# Patient Record
Sex: MALE | Race: WHITE | NOT HISPANIC OR LATINO | Employment: OTHER | URBAN - METROPOLITAN AREA
[De-identification: names, ages, dates, MRNs, and addresses within clinical notes are randomized per-mention and may not be internally consistent; named-entity substitution may affect disease eponyms.]

---

## 2017-01-03 RX ORDER — ALPRAZOLAM 2 MG/1
TABLET ORAL
Qty: 30 TABLET | Refills: 0 | Status: CANCELLED | OUTPATIENT
Start: 2017-01-03

## 2017-01-03 RX ORDER — ZOLPIDEM TARTRATE 10 MG/1
TABLET ORAL
Qty: 30 TABLET | Refills: 0 | Status: CANCELLED | OUTPATIENT
Start: 2017-01-03

## 2017-01-04 RX ORDER — ALPRAZOLAM 2 MG/1
2 TABLET ORAL 3 TIMES DAILY PRN
Qty: 30 TABLET | Refills: 0 | OUTPATIENT
Start: 2017-01-04 | End: 2017-02-01 | Stop reason: SDUPTHER

## 2017-01-04 RX ORDER — ZOLPIDEM TARTRATE 10 MG/1
10 TABLET ORAL NIGHTLY PRN
Qty: 30 TABLET | Refills: 0 | OUTPATIENT
Start: 2017-01-04 | End: 2017-01-09 | Stop reason: SDUPTHER

## 2017-01-10 RX ORDER — ZOLPIDEM TARTRATE 10 MG/1
TABLET ORAL
Qty: 30 TABLET | Refills: 0 | OUTPATIENT
Start: 2017-01-10 | End: 2017-02-01 | Stop reason: SDUPTHER

## 2017-01-25 RX ORDER — VALSARTAN 320 MG/1
TABLET ORAL
Qty: 90 TABLET | Refills: 1 | Status: SHIPPED | OUTPATIENT
Start: 2017-01-25 | End: 2017-04-18 | Stop reason: SDUPTHER

## 2017-02-01 RX ORDER — ALPRAZOLAM 2 MG/1
TABLET ORAL
Qty: 30 TABLET | Refills: 0 | OUTPATIENT
Start: 2017-02-01 | End: 2017-03-02 | Stop reason: SDUPTHER

## 2017-02-01 RX ORDER — ZOLPIDEM TARTRATE 10 MG/1
TABLET ORAL
Qty: 30 TABLET | Refills: 0 | OUTPATIENT
Start: 2017-02-01 | End: 2017-03-02 | Stop reason: SDUPTHER

## 2017-03-03 RX ORDER — ALPRAZOLAM 2 MG/1
TABLET ORAL
Qty: 30 TABLET | Refills: 0 | OUTPATIENT
Start: 2017-03-03 | End: 2017-04-03 | Stop reason: SDUPTHER

## 2017-03-03 RX ORDER — ZOLPIDEM TARTRATE 10 MG/1
TABLET ORAL
Qty: 30 TABLET | Refills: 0 | OUTPATIENT
Start: 2017-03-03 | End: 2017-04-03 | Stop reason: SDUPTHER

## 2017-04-03 RX ORDER — ALPRAZOLAM 2 MG/1
TABLET ORAL
Qty: 30 TABLET | Refills: 0 | OUTPATIENT
Start: 2017-04-03 | End: 2017-04-18 | Stop reason: SDUPTHER

## 2017-04-03 RX ORDER — ZOLPIDEM TARTRATE 10 MG/1
TABLET ORAL
Qty: 30 TABLET | Refills: 0 | OUTPATIENT
Start: 2017-04-03 | End: 2017-04-18 | Stop reason: SDUPTHER

## 2017-04-18 ENCOUNTER — OFFICE VISIT (OUTPATIENT)
Dept: FAMILY MEDICINE CLINIC | Facility: CLINIC | Age: 77
End: 2017-04-18

## 2017-04-18 VITALS
HEART RATE: 80 BPM | HEIGHT: 73 IN | DIASTOLIC BLOOD PRESSURE: 70 MMHG | SYSTOLIC BLOOD PRESSURE: 106 MMHG | BODY MASS INDEX: 29.29 KG/M2 | OXYGEN SATURATION: 93 % | TEMPERATURE: 97.9 F | WEIGHT: 221 LBS

## 2017-04-18 DIAGNOSIS — I10 HYPERTENSION, ESSENTIAL: Primary | ICD-10-CM

## 2017-04-18 DIAGNOSIS — G47.09 OTHER INSOMNIA: ICD-10-CM

## 2017-04-18 DIAGNOSIS — Z12.11 SCREEN FOR COLON CANCER: ICD-10-CM

## 2017-04-18 DIAGNOSIS — E78.5 HYPERLIPIDEMIA, UNSPECIFIED HYPERLIPIDEMIA TYPE: ICD-10-CM

## 2017-04-18 LAB
ALBUMIN SERPL-MCNC: 4.2 G/DL (ref 3.5–5.2)
ALBUMIN/GLOB SERPL: 1.6 G/DL
ALP SERPL-CCNC: 82 U/L (ref 39–117)
ALT SERPL W P-5'-P-CCNC: 42 U/L (ref 1–41)
ANION GAP SERPL CALCULATED.3IONS-SCNC: 11.7 MMOL/L
AST SERPL-CCNC: 27 U/L (ref 1–40)
BILIRUB SERPL-MCNC: 0.8 MG/DL (ref 0.1–1.2)
BUN BLD-MCNC: 9 MG/DL (ref 8–23)
BUN/CREAT SERPL: 9.2 (ref 7–25)
CALCIUM SPEC-SCNC: 9.8 MG/DL (ref 8.6–10.5)
CHLORIDE SERPL-SCNC: 106 MMOL/L (ref 98–107)
CHOLEST SERPL-MCNC: 128 MG/DL (ref 0–200)
CO2 SERPL-SCNC: 25.3 MMOL/L (ref 22–29)
CREAT BLD-MCNC: 0.98 MG/DL (ref 0.76–1.27)
GFR SERPL CREATININE-BSD FRML MDRD: 75 ML/MIN/1.73
GLOBULIN UR ELPH-MCNC: 2.7 GM/DL
GLUCOSE BLD-MCNC: 96 MG/DL (ref 65–99)
HDLC SERPL-MCNC: 30 MG/DL (ref 40–60)
LDLC SERPL CALC-MCNC: 58 MG/DL (ref 0–100)
LDLC/HDLC SERPL: 1.93 {RATIO}
POTASSIUM BLD-SCNC: 5.1 MMOL/L (ref 3.5–5.2)
PROT SERPL-MCNC: 6.9 G/DL (ref 6–8.5)
SODIUM BLD-SCNC: 143 MMOL/L (ref 136–145)
TRIGL SERPL-MCNC: 201 MG/DL (ref 0–150)
VLDLC SERPL-MCNC: 40.2 MG/DL (ref 5–40)

## 2017-04-18 PROCEDURE — 80061 LIPID PANEL: CPT | Performed by: INTERNAL MEDICINE

## 2017-04-18 PROCEDURE — 99214 OFFICE O/P EST MOD 30 MIN: CPT | Performed by: INTERNAL MEDICINE

## 2017-04-18 PROCEDURE — 80053 COMPREHEN METABOLIC PANEL: CPT | Performed by: INTERNAL MEDICINE

## 2017-04-18 RX ORDER — ATORVASTATIN CALCIUM 20 MG/1
20 TABLET, FILM COATED ORAL DAILY
Qty: 90 TABLET | Refills: 3 | Status: SHIPPED | OUTPATIENT
Start: 2017-04-18 | End: 2018-04-14 | Stop reason: SDUPTHER

## 2017-04-18 RX ORDER — ALPRAZOLAM 2 MG/1
2 TABLET ORAL NIGHTLY PRN
Qty: 30 TABLET | Refills: 0 | Status: SHIPPED | OUTPATIENT
Start: 2017-04-18 | End: 2017-06-02 | Stop reason: SDUPTHER

## 2017-04-18 RX ORDER — ZOLPIDEM TARTRATE 10 MG/1
10 TABLET ORAL NIGHTLY PRN
Qty: 30 TABLET | Refills: 0 | Status: SHIPPED | OUTPATIENT
Start: 2017-04-18 | End: 2017-06-02 | Stop reason: SDUPTHER

## 2017-04-18 RX ORDER — VALSARTAN 320 MG/1
320 TABLET ORAL DAILY
Qty: 90 TABLET | Refills: 3 | Status: SHIPPED | OUTPATIENT
Start: 2017-04-18 | End: 2018-04-14 | Stop reason: SDUPTHER

## 2017-04-18 RX ORDER — NIACIN 750 MG/1
750 TABLET, EXTENDED RELEASE ORAL 2 TIMES DAILY
Qty: 180 TABLET | Refills: 3 | Status: SHIPPED | OUTPATIENT
Start: 2017-04-18 | End: 2018-05-06 | Stop reason: SDUPTHER

## 2017-04-18 RX ORDER — OMEPRAZOLE 40 MG/1
40 CAPSULE, DELAYED RELEASE ORAL DAILY
Qty: 90 CAPSULE | Refills: 3 | Status: SHIPPED | OUTPATIENT
Start: 2017-04-18 | End: 2018-04-27 | Stop reason: SDUPTHER

## 2017-04-18 NOTE — PROGRESS NOTES
Subjective   Lucho Blank is a 76 y.o. male.   High blood pressure lipids follow-up lab  History of Present Illness   Doing fair well no complaints did discuss colonoscopy is not had one for a while and he is agreeable do so so we'll set him up for that.  No blood in stool so change in bowel habits.  Does have ongoing insomnia refill on that medication is compliant with his meds.  Tolerating statin without complaints.  Blood pressures satisfactory when he checks it  prostate is followed by urology    Review of Systems   All other systems reviewed and are negative.      Objective   Vitals:    04/18/17 0804   BP: 106/70   Pulse: 80   Temp: 97.9 °F (36.6 °C)   SpO2: 93%   Weight: 221 lb (100 kg)     Physical Exam   Constitutional: He appears well-developed and well-nourished.   Cardiovascular: Normal rate, regular rhythm and normal heart sounds.    Pulmonary/Chest: Effort normal and breath sounds normal.   Abdominal: Soft. Bowel sounds are normal.   Neurological: He is alert.   Nursing note and vitals reviewed.      No results found for: INR    Procedures    Assessment/Plan   Hypertension controlled plan continue present medicine recheck in 6 months     2.  Hyperlipidemia plan await lab if satisfactory follow-up in 6 months continue present medicine    3.  Insomnia refill Ambien    4.  Encounter for screening colonoscopy patient is agreeable to  [facility] we will refer to Dr. Victoria    Much of this encounter note is an electronic transcription/translation of spoken language to printed text.  The electronic translation of spoken language may permit erroneous, or at times, nonsensical words or phrases to be inadvertently transcribed.  Although I have reviewed the note for such errors, some may still exist.

## 2017-06-02 RX ORDER — ZOLPIDEM TARTRATE 10 MG/1
TABLET ORAL
Qty: 30 TABLET | Refills: 0 | Status: SHIPPED | OUTPATIENT
Start: 2017-06-02 | End: 2017-06-30 | Stop reason: SDUPTHER

## 2017-06-02 RX ORDER — ALPRAZOLAM 2 MG/1
TABLET ORAL
Qty: 30 TABLET | Refills: 0 | Status: SHIPPED | OUTPATIENT
Start: 2017-06-02 | End: 2017-06-30 | Stop reason: SDUPTHER

## 2017-06-17 RX ORDER — LORATADINE 10 MG/1
TABLET ORAL
Qty: 90 TABLET | Refills: 2 | Status: SHIPPED | OUTPATIENT
Start: 2017-06-17 | End: 2018-03-14 | Stop reason: SDUPTHER

## 2017-06-30 RX ORDER — ZOLPIDEM TARTRATE 10 MG/1
TABLET ORAL
Qty: 30 TABLET | Refills: 0 | OUTPATIENT
Start: 2017-06-30 | End: 2017-09-01 | Stop reason: SDUPTHER

## 2017-06-30 RX ORDER — ALPRAZOLAM 2 MG/1
TABLET ORAL
Qty: 30 TABLET | Refills: 0 | OUTPATIENT
Start: 2017-06-30 | End: 2017-09-01 | Stop reason: SDUPTHER

## 2017-08-02 ENCOUNTER — TELEPHONE (OUTPATIENT)
Dept: FAMILY MEDICINE CLINIC | Facility: CLINIC | Age: 77
End: 2017-08-02

## 2017-09-01 RX ORDER — ZOLPIDEM TARTRATE 10 MG/1
10 TABLET ORAL NIGHTLY PRN
Qty: 30 TABLET | Refills: 0 | OUTPATIENT
Start: 2017-09-01 | End: 2017-10-06 | Stop reason: SDUPTHER

## 2017-09-01 RX ORDER — ALPRAZOLAM 2 MG/1
2 TABLET ORAL EVERY 8 HOURS PRN
Qty: 30 TABLET | Refills: 0 | OUTPATIENT
Start: 2017-09-01 | End: 2017-10-03 | Stop reason: SDUPTHER

## 2017-10-03 RX ORDER — ALPRAZOLAM 2 MG/1
2 TABLET ORAL EVERY 8 HOURS PRN
Qty: 30 TABLET | Refills: 0 | OUTPATIENT
Start: 2017-10-03 | End: 2017-11-03 | Stop reason: SDUPTHER

## 2017-10-06 RX ORDER — ZOLPIDEM TARTRATE 10 MG/1
10 TABLET ORAL NIGHTLY PRN
Qty: 30 TABLET | Refills: 0 | OUTPATIENT
Start: 2017-10-06 | End: 2017-11-03 | Stop reason: SDUPTHER

## 2017-10-17 ENCOUNTER — OFFICE VISIT (OUTPATIENT)
Dept: FAMILY MEDICINE CLINIC | Facility: CLINIC | Age: 77
End: 2017-10-17

## 2017-10-17 VITALS
BODY MASS INDEX: 28.1 KG/M2 | SYSTOLIC BLOOD PRESSURE: 98 MMHG | DIASTOLIC BLOOD PRESSURE: 60 MMHG | HEART RATE: 60 BPM | TEMPERATURE: 97.8 F | WEIGHT: 212 LBS | HEIGHT: 73 IN | OXYGEN SATURATION: 98 %

## 2017-10-17 DIAGNOSIS — E78.49 OTHER HYPERLIPIDEMIA: ICD-10-CM

## 2017-10-17 DIAGNOSIS — F41.9 ANXIETY: ICD-10-CM

## 2017-10-17 DIAGNOSIS — Z00.00 ENCOUNTER FOR MEDICARE ANNUAL WELLNESS EXAM: Primary | ICD-10-CM

## 2017-10-17 DIAGNOSIS — G47.09 OTHER INSOMNIA: ICD-10-CM

## 2017-10-17 DIAGNOSIS — I10 HYPERTENSION, ESSENTIAL: ICD-10-CM

## 2017-10-17 LAB
ALBUMIN SERPL-MCNC: 4.5 G/DL (ref 3.5–5.2)
ALBUMIN/GLOB SERPL: 1.6 G/DL
ALP SERPL-CCNC: 86 U/L (ref 39–117)
ALT SERPL W P-5'-P-CCNC: 35 U/L (ref 1–41)
ANION GAP SERPL CALCULATED.3IONS-SCNC: 9.5 MMOL/L
AST SERPL-CCNC: 30 U/L (ref 1–40)
BILIRUB SERPL-MCNC: 0.9 MG/DL (ref 0.1–1.2)
BUN BLD-MCNC: 11 MG/DL (ref 8–23)
BUN/CREAT SERPL: 9.8 (ref 7–25)
CALCIUM SPEC-SCNC: 10 MG/DL (ref 8.6–10.5)
CHLORIDE SERPL-SCNC: 105 MMOL/L (ref 98–107)
CHOLEST SERPL-MCNC: 118 MG/DL (ref 0–200)
CO2 SERPL-SCNC: 29.5 MMOL/L (ref 22–29)
CREAT BLD-MCNC: 1.12 MG/DL (ref 0.76–1.27)
GFR SERPL CREATININE-BSD FRML MDRD: 64 ML/MIN/1.73
GLOBULIN UR ELPH-MCNC: 2.9 GM/DL
GLUCOSE BLD-MCNC: 101 MG/DL (ref 65–99)
HDLC SERPL-MCNC: 40 MG/DL (ref 40–60)
LDLC SERPL CALC-MCNC: 53 MG/DL (ref 0–100)
LDLC/HDLC SERPL: 1.34 {RATIO}
POTASSIUM BLD-SCNC: 5 MMOL/L (ref 3.5–5.2)
PROT SERPL-MCNC: 7.4 G/DL (ref 6–8.5)
SODIUM BLD-SCNC: 144 MMOL/L (ref 136–145)
TRIGL SERPL-MCNC: 123 MG/DL (ref 0–150)
VLDLC SERPL-MCNC: 24.6 MG/DL (ref 5–40)

## 2017-10-17 PROCEDURE — 80061 LIPID PANEL: CPT | Performed by: INTERNAL MEDICINE

## 2017-10-17 PROCEDURE — G0008 ADMIN INFLUENZA VIRUS VAC: HCPCS | Performed by: INTERNAL MEDICINE

## 2017-10-17 PROCEDURE — 90662 IIV NO PRSV INCREASED AG IM: CPT | Performed by: INTERNAL MEDICINE

## 2017-10-17 PROCEDURE — 80053 COMPREHEN METABOLIC PANEL: CPT | Performed by: INTERNAL MEDICINE

## 2017-10-17 PROCEDURE — 36415 COLL VENOUS BLD VENIPUNCTURE: CPT | Performed by: INTERNAL MEDICINE

## 2017-10-17 PROCEDURE — G0438 PPPS, INITIAL VISIT: HCPCS | Performed by: INTERNAL MEDICINE

## 2017-10-17 PROCEDURE — 99213 OFFICE O/P EST LOW 20 MIN: CPT | Performed by: INTERNAL MEDICINE

## 2017-10-17 RX ORDER — ESCITALOPRAM OXALATE 10 MG/1
TABLET ORAL
Qty: 30 TABLET | Refills: 0 | Status: SHIPPED | OUTPATIENT
Start: 2017-10-17 | End: 2017-11-03 | Stop reason: SDUPTHER

## 2017-10-17 NOTE — PROGRESS NOTES
Subjective   Lucho Blank is a 77 y.o. male.   Here for Medicare wellness exam initial visit also needs follow-up on lipids as well as blood pressure  History of Present Illness   Sees urology for elev psa  followed every 6 months by urology barely biopsy was negative by patient's description blood pressure doing fairly well no ROMs with medication for lipids patient does have increased anxiety does want to take 2 mg dose more frequently than once per day some days he does not need some days he'll need it twice a day.  We will increase his quantity from 2 mg quantity 30 to total of 60 does need immunization update today which we'll do the flu shot overall doing fairly well is been on hold antianxiety and present the past is not sure what happened with that it may been BuSpar and did not work but patient cannot recall he will have to research to ascertain, that we will let him try Lexapro.  Patient was not familiar with the Lexapro name, this is needed for free-floating anxiety.    Review of Systems   All other systems reviewed and are negative.      Objective   Vitals:    10/17/17 0928   BP: 98/60   Pulse: 60   Temp: 97.8 °F (36.6 °C)   SpO2: 98%   Weight: 212 lb (96.2 kg)     Physical Exam   Constitutional: He appears well-developed and well-nourished.   HENT:   Head: Normocephalic and atraumatic.   Eyes: EOM are normal. Pupils are equal, round, and reactive to light.   Neck:   No carotid bruits   Cardiovascular: Normal rate, regular rhythm and normal heart sounds.    Pulmonary/Chest: Effort normal and breath sounds normal.   Abdominal: Soft. Bowel sounds are normal.   Neurological: He is alert.   Patient was unremarkable gait, stable   Skin: Skin is warm and dry.   Nursing note and vitals reviewed.      No results found for: INR    Procedures    Assessment/Plan   1.  Medicare wellness examination initial encounter    2.  Hyperlipidemia plan await lab if satisfactory recheck 6 months    3.  Anxiety disorder plan  trial of Lexapro 10 mg half tablet daily for 1 week then whole tablet daily #30 call regarding status one month's time.  Also increase his Xanax to 2 mg quantity 60 last her month.    4.  Hypertension controlled plan continue present medication    5.  Insomnia plan continue Ambien    6.  Immunization update the flu shot    Much of this encounter note is an electronic transcription/translation of spoken language to printed text.  The electronic translation of spoken language may permit erroneous, or at times, nonsensical words or phrases to be inadvertently transcribed.  Although I have reviewed the note for such errors, some may still exist.    4.

## 2017-10-17 NOTE — PATIENT INSTRUCTIONS
Medicare Wellness  Personal Prevention Plan of Service     Date of Office Visit:  10/17/2017  Encounter Provider:  Ruel Quinones MD  Place of Service:  [facility]  Patient Name: Lucho Blank  :  1940    As part of the Medicare Wellness portion of your visit today, we are providing you with this personalized preventive plan of services (PPPS). This plan is based upon recommendations of the [place] Preventive Services Task Force (USPSTF) and the Advisory Committee on Immunization Practices (ACIP).    This lists the preventive care services that should be considered, and provides dates of when you are due. Items listed as completed are up-to-date and do not require any further intervention.    Health Maintenance   Topic Date Due   • TDAP/TD VACCINES (1 - Tdap) 1961   • MEDICARE ANNUAL WELLNESS  2016   • COLONOSCOPY  2016   • INFLUENZA VACCINE  2017   • PNEUMOCOCCAL VACCINES (65+ LOW/MEDIUM RISK) (2 of 2 - PPSV23) 10/25/2017   • LIPID PANEL  2018   • ZOSTER VACCINE  Completed       No orders of the defined types were placed in this encounter.      No Follow-up on file.

## 2017-10-17 NOTE — PROGRESS NOTES
QUICK REFERENCE INFORMATION:  The ABCs of the Annual Wellness Visit    Subsequent Medicare Wellness Visit    HEALTH RISK ASSESSMENT    1940    Recent Hospitalizations:  No hospitalization(s) within the last year..        Current Medical Providers:  Patient Care Team:  Ruel Quinones Jr., MD as PCP - General  Ruel Quinones Jr., MD as PCP - Claims Attributed  Tashi Alonzo MD as Consulting Physician (Ophthalmology)  Camilla Billy MD as Consulting Physician (Dermatology)  Johnie Chan Jr., MD as Consulting Physician (Urology)        Smoking Status:  History   Smoking Status   • Never Smoker   Smokeless Tobacco   • Never Used       Alcohol Consumption:  History   Alcohol Use   • Yes     Comment: occasional       Depression Screen:   PHQ-2/PHQ-9 Depression Screening 10/17/2017   Little interest or pleasure in doing things 0   Feeling down, depressed, or hopeless 0   Trouble falling or staying asleep, or sleeping too much 0   Feeling tired or having little energy 0   Poor appetite or overeating 0   Feeling bad about yourself - or that you are a failure or have let yourself or your family down 0   Trouble concentrating on things, such as reading the newspaper or watching television 0   Moving or speaking so slowly that other people could have noticed. Or the opposite - being so fidgety or restless that you have been moving around a lot more than usual 0   Thoughts that you would be better off dead, or of hurting yourself in some way 0   Total Score 0   If you checked off any problems, how difficult have these problems made it for you to do your work, take care of things at home, or get along with other people? Not difficult at all       Health Habits and Functional and Cognitive Screening:  No flowsheet data found.           Does the patient have evidence of cognitive impairment? No    Aspirin use counseling: Taking ASA appropriately as indicated      Recent Lab Results:  CMP:  Lab Results   Component  Value Date    BUN 9 04/18/2017    CREATININE 0.98 04/18/2017    EGFRIFNONA 75 04/18/2017    BCR 9.2 04/18/2017     04/18/2017    K 5.1 04/18/2017    CO2 25.3 04/18/2017    CALCIUM 9.8 04/18/2017    ALBUMIN 4.20 04/18/2017    LABIL2 1.6 04/18/2017    BILITOT 0.8 04/18/2017    ALKPHOS 82 04/18/2017    AST 27 04/18/2017    ALT 42 (H) 04/18/2017     Lipid Panel:  Lab Results   Component Value Date    CHOL 128 04/18/2017    TRIG 201 (H) 04/18/2017    HDL 30 (L) 04/18/2017    VLDL 40.2 (H) 04/18/2017    LDLCALC 58 04/18/2017    LDLHDL 1.93 04/18/2017     HbA1c:       Visual Acuity:  No exam data present    Age-appropriate Screening Schedule:  Refer to the list below for future screening recommendations based on patient's age, sex and/or medical conditions. Orders for these recommended tests are listed in the plan section. The patient has been provided with a written plan.    Health Maintenance   Topic Date Due   • TDAP/TD VACCINES (1 - Tdap) 07/19/1961   • COLONOSCOPY  04/05/2016   • INFLUENZA VACCINE  08/01/2017   • PNEUMOCOCCAL VACCINES (65+ LOW/MEDIUM RISK) (2 of 2 - PPSV23) 10/25/2017   • LIPID PANEL  04/18/2018   • ZOSTER VACCINE  Completed        Subjective   History of Present Illness    Lucho Blank is a 77 y.o. male who presents for an Subsequent Wellness Visit.    The following portions of the patient's history were reviewed and updated as appropriate: allergies, current medications, past family history, past medical history, past social history, past surgical history and problem list.    Outpatient Medications Prior to Visit   Medication Sig Dispense Refill   • ALPRAZolam (XANAX) 2 MG tablet Take 1 tablet by mouth Every 8 (Eight) Hours As Needed for Anxiety. 30 tablet 0   • aspirin 81 MG tablet Take 1 tablet by mouth daily.     • atorvastatin (LIPITOR) 20 MG tablet Take 1 tablet by mouth Daily. 90 tablet 3   • Coenzyme Q10 200 MG tablet Take 1 tablet by mouth daily.     • loratadine (CLARITIN) 10 MG  "tablet TAKE 1 TABLET DAILY 90 tablet 2   • Loratadine (CLEAR-ATADINE PO) Take 10 mg by mouth.     • Multiple Vitamin (MULTIVITAMIN) capsule Take 1 capsule by mouth daily.     • niacin (NIASPAN) 750 MG CR tablet Take 1 tablet by mouth 2 (Two) Times a Day. 180 tablet 3   • Omega-3 Fatty Acids (FISH OIL) 1200 MG capsule capsule Take 1 capsule by mouth 2 (two) times a day.     • omeprazole (priLOSEC) 40 MG capsule Take 1 capsule by mouth Daily. 90 capsule 3   • valsartan (DIOVAN) 320 MG tablet Take 1 tablet by mouth Daily. 90 tablet 3   • zolpidem (AMBIEN) 10 MG tablet Take 1 tablet by mouth At Night As Needed for Sleep. 30 tablet 0     No facility-administered medications prior to visit.        There is no problem list on file for this patient.      Advance Care Planning:  has NO advance directive - not interested in additional information    Identification of Risk Factors:  Risk factors include: weight .    Review of Systems    Compared to one year ago, the patient feels his physical health is the same.  Compared to one year ago, the patient feels his mental health is the same.    Objective     Physical Exam    Vitals:    10/17/17 0928   BP: 98/60   BP Location: Right arm   Patient Position: Sitting   Cuff Size: Adult   Pulse: 60   Temp: 97.8 °F (36.6 °C)   TempSrc: Oral   SpO2: 98%   Weight: 212 lb (96.2 kg)   Height: 73\" (185.4 cm)   PainSc: 0-No pain       Body mass index is 27.97 kg/(m^2).  Discussed the patient's BMI with him. The BMI is in the acceptable range.    Assessment/Plan   Patient Self-Management and Personalized Health Advice  The patient has been provided with information about: diet and preventive services including:   · Fall Risk assessment done, Influenza vaccine.    Visit Diagnoses:  No diagnosis found.    No orders of the defined types were placed in this encounter.      Outpatient Encounter Prescriptions as of 10/17/2017   Medication Sig Dispense Refill   • ALPRAZolam (XANAX) 2 MG tablet Take 1 " tablet by mouth Every 8 (Eight) Hours As Needed for Anxiety. 30 tablet 0   • aspirin 81 MG tablet Take 1 tablet by mouth daily.     • atorvastatin (LIPITOR) 20 MG tablet Take 1 tablet by mouth Daily. 90 tablet 3   • Coenzyme Q10 200 MG tablet Take 1 tablet by mouth daily.     • loratadine (CLARITIN) 10 MG tablet TAKE 1 TABLET DAILY 90 tablet 2   • Loratadine (CLEAR-ATADINE PO) Take 10 mg by mouth.     • Multiple Vitamin (MULTIVITAMIN) capsule Take 1 capsule by mouth daily.     • niacin (NIASPAN) 750 MG CR tablet Take 1 tablet by mouth 2 (Two) Times a Day. 180 tablet 3   • Omega-3 Fatty Acids (FISH OIL) 1200 MG capsule capsule Take 1 capsule by mouth 2 (two) times a day.     • omeprazole (priLOSEC) 40 MG capsule Take 1 capsule by mouth Daily. 90 capsule 3   • valsartan (DIOVAN) 320 MG tablet Take 1 tablet by mouth Daily. 90 tablet 3   • zolpidem (AMBIEN) 10 MG tablet Take 1 tablet by mouth At Night As Needed for Sleep. 30 tablet 0     No facility-administered encounter medications on file as of 10/17/2017.        Reviewed use of high risk medication in the elderly: yes  Reviewed for potential of harmful drug interactions in the elderly: yes    Follow Up:  No Follow-up on file.     An After Visit Summary and PPPS with all of these plans were given to the patient.

## 2017-11-03 RX ORDER — ZOLPIDEM TARTRATE 10 MG/1
10 TABLET ORAL NIGHTLY PRN
Qty: 30 TABLET | Refills: 0 | OUTPATIENT
Start: 2017-11-03 | End: 2017-12-05 | Stop reason: SDUPTHER

## 2017-11-03 RX ORDER — ALPRAZOLAM 2 MG/1
2 TABLET ORAL EVERY 8 HOURS PRN
Qty: 60 TABLET | Refills: 0 | OUTPATIENT
Start: 2017-11-03 | End: 2017-12-04 | Stop reason: SDUPTHER

## 2017-11-03 RX ORDER — ESCITALOPRAM OXALATE 10 MG/1
10 TABLET ORAL DAILY
Qty: 90 TABLET | Refills: 3 | Status: SHIPPED | OUTPATIENT
Start: 2017-11-03 | End: 2018-10-11 | Stop reason: SDUPTHER

## 2017-12-01 ENCOUNTER — TELEPHONE (OUTPATIENT)
Dept: FAMILY MEDICINE CLINIC | Facility: CLINIC | Age: 77
End: 2017-12-01

## 2017-12-01 NOTE — TELEPHONE ENCOUNTER
PATIENTS WIFE HAD TALKED TO AGUSTINA MONTE YESTERDAY ABOUT SOME MEDICATION. WIFE WANTS TO TALK TO AGUSTINA ABOUT THIS AGAIN

## 2017-12-04 RX ORDER — ALPRAZOLAM 2 MG/1
2 TABLET ORAL EVERY 8 HOURS PRN
Qty: 60 TABLET | Refills: 0 | OUTPATIENT
Start: 2017-12-04 | End: 2018-01-05 | Stop reason: SDUPTHER

## 2017-12-04 RX ORDER — CYCLOSPORINE 0.5 MG/ML
1 EMULSION OPHTHALMIC 2 TIMES DAILY
Qty: 16.5 ML | Refills: 3 | Status: SHIPPED | OUTPATIENT
Start: 2017-12-04 | End: 2018-10-11 | Stop reason: SDUPTHER

## 2017-12-05 RX ORDER — ZOLPIDEM TARTRATE 10 MG/1
10 TABLET ORAL NIGHTLY PRN
Qty: 90 TABLET | Refills: 0 | Status: SHIPPED | OUTPATIENT
Start: 2017-12-05 | End: 2017-12-09 | Stop reason: SDUPTHER

## 2017-12-11 NOTE — TELEPHONE ENCOUNTER
Ok if time but think this may be duplicate  Please check  Also confirm that he gets 30d  Not 90 d mail order

## 2017-12-12 RX ORDER — ZOLPIDEM TARTRATE 10 MG/1
TABLET ORAL
Qty: 30 TABLET | Refills: 0 | Status: SHIPPED | OUTPATIENT
Start: 2017-12-12 | End: 2018-04-19 | Stop reason: SDUPTHER

## 2018-01-05 DIAGNOSIS — Z79.899 HIGH RISK MEDICATION USE: Primary | ICD-10-CM

## 2018-01-05 RX ORDER — ALPRAZOLAM 2 MG/1
TABLET ORAL
Qty: 60 TABLET | Refills: 0 | Status: SHIPPED | OUTPATIENT
Start: 2018-01-05 | End: 2018-04-19 | Stop reason: SDUPTHER

## 2018-01-23 ENCOUNTER — LAB (OUTPATIENT)
Dept: FAMILY MEDICINE CLINIC | Facility: CLINIC | Age: 78
End: 2018-01-23

## 2018-01-23 DIAGNOSIS — Z79.899 HIGH RISK MEDICATION USE: ICD-10-CM

## 2018-01-29 LAB — CONV REPORT SUMMARY: NORMAL

## 2018-03-14 RX ORDER — LORATADINE 10 MG/1
TABLET ORAL
Qty: 90 TABLET | Refills: 2 | Status: SHIPPED | OUTPATIENT
Start: 2018-03-14 | End: 2018-12-09 | Stop reason: SDUPTHER

## 2018-04-16 RX ORDER — ATORVASTATIN CALCIUM 20 MG/1
TABLET, FILM COATED ORAL
Qty: 90 TABLET | Refills: 3 | Status: SHIPPED | OUTPATIENT
Start: 2018-04-16 | End: 2019-04-11 | Stop reason: SDUPTHER

## 2018-04-16 RX ORDER — VALSARTAN 320 MG/1
TABLET ORAL
Qty: 90 TABLET | Refills: 3 | Status: SHIPPED | OUTPATIENT
Start: 2018-04-16 | End: 2019-04-11 | Stop reason: SDUPTHER

## 2018-04-17 ENCOUNTER — OFFICE VISIT (OUTPATIENT)
Dept: FAMILY MEDICINE CLINIC | Facility: CLINIC | Age: 78
End: 2018-04-17

## 2018-04-17 VITALS
HEART RATE: 78 BPM | BODY MASS INDEX: 30.51 KG/M2 | DIASTOLIC BLOOD PRESSURE: 60 MMHG | HEIGHT: 73 IN | SYSTOLIC BLOOD PRESSURE: 110 MMHG | TEMPERATURE: 97.6 F | WEIGHT: 230.2 LBS | OXYGEN SATURATION: 95 %

## 2018-04-17 DIAGNOSIS — I10 HYPERTENSION, ESSENTIAL: ICD-10-CM

## 2018-04-17 DIAGNOSIS — R53.83 OTHER FATIGUE: ICD-10-CM

## 2018-04-17 DIAGNOSIS — F41.9 ANXIETY: Primary | ICD-10-CM

## 2018-04-17 DIAGNOSIS — E78.49 OTHER HYPERLIPIDEMIA: ICD-10-CM

## 2018-04-17 LAB
ALBUMIN SERPL-MCNC: 4.3 G/DL (ref 3.5–5.2)
ALBUMIN/GLOB SERPL: 1.7 G/DL
ALP SERPL-CCNC: 84 U/L (ref 39–117)
ALT SERPL W P-5'-P-CCNC: 43 U/L (ref 1–41)
ANION GAP SERPL CALCULATED.3IONS-SCNC: 12.6 MMOL/L
AST SERPL-CCNC: 35 U/L (ref 1–40)
BILIRUB SERPL-MCNC: 0.8 MG/DL (ref 0.1–1.2)
BUN BLD-MCNC: 12 MG/DL (ref 8–23)
BUN/CREAT SERPL: 10.6 (ref 7–25)
CALCIUM SPEC-SCNC: 9.6 MG/DL (ref 8.6–10.5)
CHLORIDE SERPL-SCNC: 104 MMOL/L (ref 98–107)
CHOLEST SERPL-MCNC: 116 MG/DL (ref 0–200)
CO2 SERPL-SCNC: 25.4 MMOL/L (ref 22–29)
CREAT BLD-MCNC: 1.13 MG/DL (ref 0.76–1.27)
ERYTHROCYTE [DISTWIDTH] IN BLOOD BY AUTOMATED COUNT: 12.6 % (ref 4.5–15)
GFR SERPL CREATININE-BSD FRML MDRD: 63 ML/MIN/1.73
GLOBULIN UR ELPH-MCNC: 2.5 GM/DL
GLUCOSE BLD-MCNC: 105 MG/DL (ref 65–99)
HCT VFR BLD AUTO: 43.8 % (ref 35–60)
HDLC SERPL-MCNC: 34 MG/DL (ref 40–60)
HGB BLD-MCNC: 15.1 G/DL (ref 13.5–18)
LDLC SERPL CALC-MCNC: 54 MG/DL (ref 0–100)
LDLC/HDLC SERPL: 1.58 {RATIO}
LYMPHOCYTES # BLD AUTO: 2.2 10*3/MM3 (ref 1.2–3.4)
LYMPHOCYTES NFR BLD AUTO: 24 % (ref 21–51)
MCH RBC QN AUTO: 31.9 PG (ref 26.1–33.1)
MCHC RBC AUTO-ENTMCNC: 34.5 G/DL (ref 33–37)
MCV RBC AUTO: 92.6 FL (ref 80–99)
MONOCYTES # BLD AUTO: 0.4 10*3/MM3 (ref 0.1–0.6)
MONOCYTES NFR BLD AUTO: 4.7 % (ref 2–9)
NEUTROPHILS # BLD AUTO: 6.4 10*3/MM3 (ref 1.4–6.5)
NEUTROPHILS NFR BLD AUTO: 71.3 % (ref 42–75)
PLATELET # BLD AUTO: 191 10*3/MM3 (ref 150–450)
PMV BLD AUTO: 8.2 FL (ref 7.1–10.5)
POTASSIUM BLD-SCNC: 5 MMOL/L (ref 3.5–5.2)
PROT SERPL-MCNC: 6.8 G/DL (ref 6–8.5)
RBC # BLD AUTO: 4.73 10*6/MM3 (ref 4–6)
SODIUM BLD-SCNC: 142 MMOL/L (ref 136–145)
TRIGL SERPL-MCNC: 142 MG/DL (ref 0–150)
TSH SERPL DL<=0.05 MIU/L-ACNC: 3.67 MIU/ML (ref 0.27–4.2)
VLDLC SERPL-MCNC: 28.4 MG/DL (ref 5–40)
WBC NRBC COR # BLD: 9 10*3/MM3 (ref 4.5–10)

## 2018-04-17 PROCEDURE — 80053 COMPREHEN METABOLIC PANEL: CPT | Performed by: INTERNAL MEDICINE

## 2018-04-17 PROCEDURE — 84443 ASSAY THYROID STIM HORMONE: CPT | Performed by: INTERNAL MEDICINE

## 2018-04-17 PROCEDURE — 80061 LIPID PANEL: CPT | Performed by: INTERNAL MEDICINE

## 2018-04-17 PROCEDURE — 85025 COMPLETE CBC W/AUTO DIFF WBC: CPT | Performed by: INTERNAL MEDICINE

## 2018-04-17 PROCEDURE — 36415 COLL VENOUS BLD VENIPUNCTURE: CPT | Performed by: INTERNAL MEDICINE

## 2018-04-17 PROCEDURE — 99214 OFFICE O/P EST MOD 30 MIN: CPT | Performed by: INTERNAL MEDICINE

## 2018-04-17 RX ORDER — NAPROXEN 500 MG/1
500 TABLET ORAL 2 TIMES DAILY WITH MEALS
Qty: 60 TABLET | Refills: 0 | Status: SHIPPED | OUTPATIENT
Start: 2018-04-17 | End: 2018-04-17

## 2018-04-17 NOTE — PROGRESS NOTES
Subjective   Lucho Blank is a 77 y.o. male.   Follow-up on blood pressure and lipids doing fairly well no complaints.  Does see lab work obtained today  History of Present Illness   As above with blood pressure lipids also having some mild fatigue.  No overt clues with this.  Not aware of any thyroid trouble since past however anemia problems with him does not have a history for sleep apnea.  Patient is doing well with this Lexapro and when necessary Xanax for anxiety disorder    Review of Systems   All other systems reviewed and are negative.      Objective   Vitals:    04/17/18 0922   BP: 110/60   Pulse: 78   Temp: 97.6 °F (36.4 °C)   SpO2: 95%   Weight: 104 kg (230 lb 3.2 oz)     Physical Exam   Constitutional: He appears well-developed and well-nourished.   HENT:   Head: Normocephalic and atraumatic.   Eyes: Conjunctivae are normal. Pupils are equal, round, and reactive to light.   Cardiovascular: Normal rate, regular rhythm and normal heart sounds.    Pulmonary/Chest: Effort normal and breath sounds normal.   Abdominal: Soft. Bowel sounds are normal.   Neurological: He is alert.   Unremarkable gait and station   Skin: Skin is warm and dry.   Psychiatric: He has a normal mood and affect. His behavior is normal.   Nursing note and vitals reviewed.      No results found for: INR    Procedures    Assessment/Plan   1.  Hypertension controlled plan continue present medicine recheck in 6 months    2.  Hyperlipidemia plan await lab if good follow-up 6 months    3.  Fatigue undefined weight pending lab for further direction.    4.  Anxiety disorder compensated on Lexapro and when necessary Xanax.    Much of this encounter note is an electronic transcription/translation of spoken language to printed text.  The electronic translation of spoken language may permit erroneous, or at times, nonsensical words or phrases to be inadvertently transcribed.  Although I have reviewed the note for such errors, some may still exist.  If there are questions or for further clarification, please contact me.

## 2018-04-19 ENCOUNTER — TELEPHONE (OUTPATIENT)
Dept: FAMILY MEDICINE CLINIC | Facility: CLINIC | Age: 78
End: 2018-04-19

## 2018-04-19 RX ORDER — ZOLPIDEM TARTRATE 10 MG/1
10 TABLET ORAL NIGHTLY PRN
Qty: 90 TABLET | Refills: 0 | Status: SHIPPED | OUTPATIENT
Start: 2018-04-19 | End: 2018-07-18 | Stop reason: SDUPTHER

## 2018-04-19 RX ORDER — ALPRAZOLAM 2 MG/1
2 TABLET ORAL 3 TIMES DAILY PRN
Qty: 180 TABLET | Refills: 0 | Status: SHIPPED | OUTPATIENT
Start: 2018-04-19 | End: 2018-06-05 | Stop reason: SDUPTHER

## 2018-04-23 ENCOUNTER — TELEPHONE (OUTPATIENT)
Dept: FAMILY MEDICINE CLINIC | Facility: CLINIC | Age: 78
End: 2018-04-23

## 2018-04-23 ENCOUNTER — CLINICAL SUPPORT (OUTPATIENT)
Dept: FAMILY MEDICINE CLINIC | Facility: CLINIC | Age: 78
End: 2018-04-23

## 2018-04-23 DIAGNOSIS — R53.83 OTHER FATIGUE: Primary | ICD-10-CM

## 2018-04-23 PROCEDURE — 90632 HEPA VACCINE ADULT IM: CPT | Performed by: INTERNAL MEDICINE

## 2018-04-23 PROCEDURE — 90471 IMMUNIZATION ADMIN: CPT | Performed by: INTERNAL MEDICINE

## 2018-04-27 RX ORDER — OMEPRAZOLE 40 MG/1
CAPSULE, DELAYED RELEASE ORAL
Qty: 90 CAPSULE | Refills: 3 | Status: SHIPPED | OUTPATIENT
Start: 2018-04-27 | End: 2019-04-22 | Stop reason: SDUPTHER

## 2018-05-07 RX ORDER — NIACIN 750 MG/1
TABLET, EXTENDED RELEASE ORAL
Qty: 180 TABLET | Refills: 3 | Status: SHIPPED | OUTPATIENT
Start: 2018-05-07 | End: 2018-06-02 | Stop reason: SDUPTHER

## 2018-06-01 RX ORDER — EPINEPHRINE 0.3 MG/.3ML
INJECTION SUBCUTANEOUS
Qty: 1 EACH | Refills: 3 | Status: SHIPPED | OUTPATIENT
Start: 2018-06-01 | End: 2021-10-26

## 2018-06-05 RX ORDER — ALPRAZOLAM 2 MG/1
2 TABLET ORAL 3 TIMES DAILY PRN
Qty: 180 TABLET | Refills: 0 | Status: SHIPPED | OUTPATIENT
Start: 2018-06-05 | End: 2018-06-19 | Stop reason: SDUPTHER

## 2018-06-08 ENCOUNTER — OFFICE VISIT (OUTPATIENT)
Dept: SLEEP MEDICINE | Facility: HOSPITAL | Age: 78
End: 2018-06-08
Attending: INTERNAL MEDICINE

## 2018-06-08 VITALS
BODY MASS INDEX: 30.29 KG/M2 | HEART RATE: 73 BPM | HEIGHT: 74 IN | DIASTOLIC BLOOD PRESSURE: 60 MMHG | SYSTOLIC BLOOD PRESSURE: 104 MMHG | WEIGHT: 236 LBS

## 2018-06-08 DIAGNOSIS — G47.33 OBSTRUCTIVE SLEEP APNEA: Primary | ICD-10-CM

## 2018-06-08 DIAGNOSIS — E66.9 OBESITY (BMI 30-39.9): ICD-10-CM

## 2018-06-08 DIAGNOSIS — R06.83 SNORING: ICD-10-CM

## 2018-06-08 DIAGNOSIS — G47.10 HYPERSOMNOLENCE: ICD-10-CM

## 2018-06-08 DIAGNOSIS — G47.00 INSOMNIA, UNSPECIFIED TYPE: ICD-10-CM

## 2018-06-08 PROCEDURE — G0463 HOSPITAL OUTPT CLINIC VISIT: HCPCS

## 2018-06-08 NOTE — PROGRESS NOTES
[facility] SLEEP MEDICINE  [address] [facility]  3rd [address]  [place] [address]  [phone]    Referring Physician: Dr. Quinones  PCP: Ruel Quinones MD    Reason for consult:  Sleep disorders of excessive sleepiness    Lucho Blank is a 77 y.o.male was seen in the [facility] today. He sleeps from 10p to 7:30a. He wakes up rested. Has loud snoring. Wakes up coughing and choking. He has EDS abel if sitting quietly. Awakens with dry mouth. Takes ambien 10mg qhs for insomnia, but no trouble if doesn't take now. He takes xanax 2mg in afternoon for anxiety.    [place] Sleepiness Score: 8. Caffeine intake 5 per day. Alcohol intake 2 per week.    Lucho Blank  has a past medical history of Allergic; Anxiety; Anxiety disorder; Cervical pain; Chest pain; History of EKG (03/20/2014); Hyperlipidemia; Hypertension; Increased serum lipids; Insomnia; Left ear pain; Right knee pain; and Sleep disturbance.     Current Medications:    Current Outpatient Prescriptions:   •  ALPRAZolam (XANAX) 2 MG tablet, Take 1 tablet by mouth 3 (Three) Times a Day As Needed for Anxiety. for anxiety, Disp: 180 tablet, Rfl: 0  •  aspirin 81 MG tablet, Take 1 tablet by mouth daily., Disp: , Rfl:   •  atorvastatin (LIPITOR) 20 MG tablet, TAKE 1 TABLET DAILY, Disp: 90 tablet, Rfl: 3  •  Coenzyme Q10 200 MG tablet, Take 1 tablet by mouth daily., Disp: , Rfl:   •  cycloSPORINE (RESTASIS MULTIDOSE) 0.05 % ophthalmic emulsion, Administer 1 drop to both eyes 2 (Two) Times a Day., Disp: 16.5 mL, Rfl: 3  •  EPINEPHrine (EPIPEN 2-TANNER) 0.3 MG/0.3ML solution auto-injector injection, USE AS DIRECTED, Disp: 1 each, Rfl: 3  •  escitalopram (LEXAPRO) 10 MG tablet, Take 1 tablet by mouth Daily., Disp: 90 tablet, Rfl: 3  •  loratadine (CLARITIN) 10 MG tablet, TAKE 1 TABLET DAILY, Disp: 90 tablet, Rfl: 2  •  Loratadine (CLEAR-ATADINE PO), Take 10 mg by mouth., Disp: , Rfl:   •  Multiple Vitamin (MULTIVITAMIN) capsule, Take 1 capsule by mouth  "daily., Disp: , Rfl:   •  NIASPAN 750 MG CR tablet, TAKE 2 TABLETS EVERY DAY, Disp: 180 tablet, Rfl: 1  •  Omega-3 Fatty Acids (FISH OIL) 1200 MG capsule capsule, Take 1 capsule by mouth 2 (two) times a day., Disp: , Rfl:   •  omeprazole (priLOSEC) 40 MG capsule, TAKE 1 CAPSULE DAILY, Disp: 90 capsule, Rfl: 3  •  valsartan (DIOVAN) 320 MG tablet, TAKE 1 TABLET DAILY, Disp: 90 tablet, Rfl: 3  •  zolpidem (AMBIEN) 10 MG tablet, Take 1 tablet by mouth At Night As Needed for Sleep., Disp: 90 tablet, Rfl: 0   also listed in Sleep Questionnaire.    FH: family history includes Cancer in his brother; Diabetes in his brother; Heart disease in his father; Hypertension in his father; Lung disease in his father; Stroke in his brother.  SH:  reports that he has never smoked. He has never used smokeless tobacco. He reports that he drinks alcohol. He reports that he does not use drugs.    Pertinent Positive Review of Systems of nasal congestion, PND, cough, rash  Rest of Review of Systems was negative as recorded in Sleep Questionnaire.        Vital Signs: /60   Pulse 73   Ht 186.7 cm (73.5\")   Wt 107 kg (236 lb)   BMI 30.71 kg/m²     Body mass index is 30.71 kg/m².       Tongue: normal      Dentition: good       Pharynx: Posterior pharyngeal pillars are wide   Mallampatti: III (soft and hard palate and base of uvula visible)        General: Alert. Cooperative. Well developed. No acute distress.             Head:  Normocephalic. Symmetrical. Atraumatic.              Eyes: Sclera clear. No icterus. PERRLA. Normal EOM.             Ears: No deformities. Normal hearing.             Nose: No septal deviation. No drainage.          Throat: No oral lesions. No thrush. Moist mucous membranes.    Chest Wall:  Normal shape. Symmetric expansion with respiration. No tenderness.             Neck:  Trachea midline.           Lungs:  Clear to auscultation bilaterally. No wheezes. No rhonchi. No rales. Respirations regular, even and " unlabored.            Heart:  Regular rhythm and normal rate. Normal S1 and S2. No murmur.     Abdomen:  Soft, non-tender and non-distended. Normal bowel sounds. No masses.  Extremities:  Moves all extremities well. No edema.           Pulses: Pulses palpable and equal bilaterally.               Skin: Dry. Intact. No bleeding. No rash.           Neuro: Moves all 4 extremities and cranial nerves grossly intact.  Psychiatric: Normal mood and affect.    Impression:  1. Obstructive sleep apnea    2. Snoring    3. Hypersomnolence    4. Obesity (BMI 30-39.9)    5. Insomnia, unspecified type        Plan:  Lucho report symptoms of snoring as well as daytime drowsiness.  He awakens with a dry throat.  I will go ahead and schedule him for a polysomnogram study to rule out obstructive sleep apnea.  Other etiologies of hypersomnolence will also be studied.  Patient was cautioned about cardiovascular health effects of untreated sleep apnea.  Weight loss may help in reducing severity.    Patient is currently taking Ambien but notices no difference if he does not use the Ambien.  Unclear if he needs to stay on this.  I will plan on tapering him off any sleep aids after sleep study becomes available.  He was however asked to bring his Ambien for purpose of sleep study to the lab.    This patient has typical features of obstructive sleep apnea with hypersomnolence snoring and apneas along with elevated BMI and classic oropharyngeal structure.  Likelihood of obstructive sleep apnea is high and a polysomnogram study will therefore be requested.      Possible diagnosis and pathophysiology of obstructive sleep apnea was discussed with the patient.  Health risks of untreated obstructive sleep apnea including cardiovascular risks were discussed.  Patient was cautioned about activities requiring full concentration especially driving at night or for longer distances, and until his hypersomnolence is corrected.    Results of sleep testing  will be reviewed to see if patient is a candidate for CPAP machine.  Alternatives to therapy include oral mandibular advancing device (OMAD) were discussed. However OMAD is usually only beneficial in mild obstructive sleep apnea.  The benefit of weight loss in reducing severity of obstructive sleep apnea was discussed.  Patient would benefit from adhering to a strict diet to achieve ideal BMI.     Patient will follow up in this clinic after study.    Thank you for allowing me to participate in your patient's care.    Nick Raymundo MD    Part of this note may be an electronic transcription/translation of spoken language to printed text using the Dragon Dictation System.

## 2018-06-09 ENCOUNTER — HOSPITAL ENCOUNTER (OUTPATIENT)
Dept: SLEEP MEDICINE | Facility: HOSPITAL | Age: 78
Discharge: HOME | End: 2018-06-09
Attending: INTERNAL MEDICINE | Admitting: INTERNAL MEDICINE

## 2018-06-09 DIAGNOSIS — G47.33 OBSTRUCTIVE SLEEP APNEA: ICD-10-CM

## 2018-06-09 PROCEDURE — 95811 POLYSOM 6/>YRS CPAP 4/> PARM: CPT

## 2018-06-19 ENCOUNTER — TELEPHONE (OUTPATIENT)
Dept: SLEEP MEDICINE | Facility: HOSPITAL | Age: 78
End: 2018-06-19

## 2018-06-19 ENCOUNTER — TELEPHONE (OUTPATIENT)
Dept: FAMILY MEDICINE CLINIC | Facility: CLINIC | Age: 78
End: 2018-06-19

## 2018-06-19 RX ORDER — ALPRAZOLAM 2 MG/1
2 TABLET ORAL 3 TIMES DAILY PRN
Qty: 270 TABLET | Refills: 0 | Status: SHIPPED | OUTPATIENT
Start: 2018-06-19 | End: 2018-09-19 | Stop reason: SDUPTHER

## 2018-06-19 NOTE — TELEPHONE ENCOUNTER
Tech called pt no answer left vm informing pt resutls were in and that sent DME set up to [facility]. Also informed pt of scheduled F/U appt as well. MAB

## 2018-07-18 RX ORDER — ZOLPIDEM TARTRATE 10 MG/1
10 TABLET ORAL NIGHTLY PRN
Qty: 90 TABLET | Refills: 0 | Status: SHIPPED | OUTPATIENT
Start: 2018-07-18 | End: 2018-10-16 | Stop reason: SDUPTHER

## 2018-08-08 ENCOUNTER — TELEPHONE (OUTPATIENT)
Dept: FAMILY MEDICINE CLINIC | Facility: CLINIC | Age: 78
End: 2018-08-08

## 2018-09-14 ENCOUNTER — OFFICE VISIT (OUTPATIENT)
Dept: SLEEP MEDICINE | Facility: HOSPITAL | Age: 78
End: 2018-09-14
Attending: INTERNAL MEDICINE

## 2018-09-14 VITALS
HEIGHT: 73 IN | HEART RATE: 86 BPM | SYSTOLIC BLOOD PRESSURE: 120 MMHG | BODY MASS INDEX: 31.28 KG/M2 | WEIGHT: 236 LBS | DIASTOLIC BLOOD PRESSURE: 64 MMHG

## 2018-09-14 DIAGNOSIS — E66.9 OBESITY (BMI 30-39.9): ICD-10-CM

## 2018-09-14 DIAGNOSIS — G47.33 OBSTRUCTIVE SLEEP APNEA: Primary | ICD-10-CM

## 2018-09-14 PROCEDURE — G0463 HOSPITAL OUTPT CLINIC VISIT: HCPCS

## 2018-09-14 NOTE — PROGRESS NOTES
[facility] SLEEP MEDICINE  [address]  [address] [facility] [address]  [phone]    PCP: Ruel Quinones Jr., MD    Reason for visit:  Sleep disorders: JHON    Lucho is a 78 y.o.male who was seen in the [facility] today. He was setup with CPAP after PSG. He is getting used to it. Using ffm. Fits well, no air leaks. Sleeps from 9:30p to 7a. He wakes up rested.  [place] Sleepiness Scale is 8. Caffeine 3 per day. Alcohol 2 per week.    Lucho  reports that he has never smoked. He has never used smokeless tobacco.    Pertinent Positive Review of Systems of anxiety  Rest of Review of Systems was negative as recorded in Sleep Questionnaire.    Patient  has a past medical history of Allergic; Anxiety; Anxiety disorder; Cervical pain; Chest pain; History of EKG (03/20/2014); Hyperlipidemia; Hypertension; Increased serum lipids; Insomnia; Left ear pain; Right knee pain; and Sleep disturbance.     Current Medications:    Current Outpatient Prescriptions:   •  ALPRAZolam (XANAX) 2 MG tablet, Take 1 tablet by mouth 3 (Three) Times a Day As Needed for Anxiety. for anxiety, Disp: 270 tablet, Rfl: 0  •  aspirin 81 MG tablet, Take 1 tablet by mouth daily., Disp: , Rfl:   •  atorvastatin (LIPITOR) 20 MG tablet, TAKE 1 TABLET DAILY, Disp: 90 tablet, Rfl: 3  •  Coenzyme Q10 200 MG tablet, Take 1 tablet by mouth daily., Disp: , Rfl:   •  cycloSPORINE (RESTASIS MULTIDOSE) 0.05 % ophthalmic emulsion, Administer 1 drop to both eyes 2 (Two) Times a Day., Disp: 16.5 mL, Rfl: 3  •  EPINEPHrine (EPIPEN 2-TANNER) 0.3 MG/0.3ML solution auto-injector injection, USE AS DIRECTED, Disp: 1 each, Rfl: 3  •  escitalopram (LEXAPRO) 10 MG tablet, Take 1 tablet by mouth Daily., Disp: 90 tablet, Rfl: 3  •  loratadine (CLARITIN) 10 MG tablet, TAKE 1 TABLET DAILY, Disp: 90 tablet, Rfl: 2  •  Loratadine (CLEAR-ATADINE PO), Take 10 mg by mouth., Disp: , Rfl:   •  Multiple Vitamin (MULTIVITAMIN) capsule, Take 1 capsule by mouth  "daily., Disp: , Rfl:   •  NIASPAN 750 MG CR tablet, TAKE 2 TABLETS EVERY DAY, Disp: 180 tablet, Rfl: 1  •  Omega-3 Fatty Acids (FISH OIL) 1200 MG capsule capsule, Take 1 capsule by mouth 2 (two) times a day., Disp: , Rfl:   •  omeprazole (priLOSEC) 40 MG capsule, TAKE 1 CAPSULE DAILY, Disp: 90 capsule, Rfl: 3  •  valsartan (DIOVAN) 320 MG tablet, TAKE 1 TABLET DAILY, Disp: 90 tablet, Rfl: 3  •  zolpidem (AMBIEN) 10 MG tablet, Take 1 tablet by mouth At Night As Needed for Sleep., Disp: 90 tablet, Rfl: 0   also entered in Sleep Questionnaire         Vital Signs: /64   Pulse 86   Ht 185.4 cm (73\")   Wt 107 kg (236 lb)   BMI 31.14 kg/m²     Body mass index is 31.14 kg/m².       Tongue: large      Dentition: good       Pharynx: Posterior pharyngeal pillars are wide   Mallampatti: III (soft and hard palate and base of uvula visible)        General: Alert. Cooperative. Well developed. No acute distress.             Head:  Normocephalic. Symmetrical. Atraumatic.              Nose: No septal deviation. No drainage.          Throat: No oral lesions. No thrush. Moist mucous membranes.    Chest Wall:  Normal shape. Symmetric expansion with respiration. No tenderness.             Neck:  Trachea midline.           Lungs:  Clear to auscultation bilaterally. No wheezes. No rhonchi. No rales. Respirations regular, even and unlabored.            Heart:  Regular rhythm and normal rate. Normal S1 and S2. No murmur.     Abdomen:  Soft, non-tender and non-distended. Normal bowel sounds. No masses.  Extremities:  Moves all extremities well. No edema.    Psychiatric: Normal mood and affect.    Study:  · 6/10/18  Overnight split polysomnogram study.  Diagnostic from 10:27 PM to 12:59 AM.  Sleep efficiency 51.9% which is poor.  Total sleep time 1.32 hours.  There was absence of slow-wave and REM sleep on the diagnostic study.  AHI index 19.7 with RDI 30.3.  Patient slept in supine position for the entire diagnostic portion.  REM " stage sleep was not seen and therefore severity underestimated.  Oxygen saturation remained above 88%.  Arousal index 36 events an hour.  PLM index is not increased.  Patient had 36.66% time snoring.  Titration portion from 12:59 AM to 5:30 AM.  Sleep efficiency was again very poor at 56.9%.  Some improvement in slow-wave and REM sleep to 7% each.  AHI index was improved.  There are indicators for severe in rem stage sleep.  CPAP increased from 5-10 cm water pressure stopped maximum sleep noted at CPAP pressure 10 cm.  There was some stage REM sleep at 8 cm water pressure and AHI index is increased because of this.  Patient may require pressures slightly higher than 10 cm.  Please note the patient notes regarding big oral air leak late in the study.    Testing:  · 30 days compliance 100%.  Average usage 9 hours 21 minutes.  Auto CPAP set between 10 and 15 cm.  Average pressures 14.6.  AHI is 3.5.    DME Company: [facility]    Impression:  1. Obstructive sleep apnea    2. Obesity (BMI 30-39.9)        Plan:  Lucho is compliant with the machine.  The CPAP seems to be adjusting to the highest available pressure.  Despite this his AHI is within normal limits.  He seems comfortable with current settings and I will keep him on same.  Widening of the auto CPAP pressure set up to 12-16 cm can be considered at the next visit.  Weight reduction may help in reducing severity of sleep-disordered breathing.  He was asked to change his supplies regularly.     I reiterated the importance of effective treatment of obstructive sleep apnea with PAP machine.  Cardiovascular health risks of untreated sleep apnea were again reviewed.  Patient was asked to remain cautious if there is persistent hypersomnolence. The benefit of weight loss in reducing severity of obstructive sleep apnea was discussed.  Patient would benefit from adhering to a strict diet to achieve ideal BMI.     Change of PAP supplies regularly is important for  effective use.  Change of cushion on the mask or plugs on nasal pillows along with disposable filters once every month and change of mask frame, tubing, headgear and Velcro straps every 6 months at the minimum was reiterated.    This patient is compliant with PAP machine and benefits from its use.  Apnea hypopneas index is corrected/improved.  Daytime hypersomnolence has resolved.     Patient will follow up in this clinic in 6 months  APRN    Thank you for allowing me to participate in your patient's care.    Nick Raymundo MD    Part of this note may be an electronic transcription/translation of spoken language to printed text using the Dragon Dictation System.

## 2018-09-19 RX ORDER — ALPRAZOLAM 2 MG/1
2 TABLET ORAL 3 TIMES DAILY PRN
Qty: 270 TABLET | Refills: 0 | Status: SHIPPED | OUTPATIENT
Start: 2018-09-19 | End: 2020-07-07 | Stop reason: SDUPTHER

## 2018-10-11 RX ORDER — ESCITALOPRAM OXALATE 10 MG/1
TABLET ORAL
Qty: 90 TABLET | Refills: 3 | Status: SHIPPED | OUTPATIENT
Start: 2018-10-11 | End: 2019-10-06 | Stop reason: SDUPTHER

## 2018-10-16 ENCOUNTER — OFFICE VISIT (OUTPATIENT)
Dept: FAMILY MEDICINE CLINIC | Facility: CLINIC | Age: 78
End: 2018-10-16

## 2018-10-16 VITALS
OXYGEN SATURATION: 93 % | WEIGHT: 236.4 LBS | HEIGHT: 73 IN | TEMPERATURE: 98.6 F | BODY MASS INDEX: 31.33 KG/M2 | SYSTOLIC BLOOD PRESSURE: 104 MMHG | DIASTOLIC BLOOD PRESSURE: 70 MMHG | HEART RATE: 77 BPM

## 2018-10-16 DIAGNOSIS — R19.7 DIARRHEA, UNSPECIFIED TYPE: ICD-10-CM

## 2018-10-16 DIAGNOSIS — G47.00 INSOMNIA, UNSPECIFIED TYPE: ICD-10-CM

## 2018-10-16 DIAGNOSIS — E78.5 HYPERLIPIDEMIA, UNSPECIFIED HYPERLIPIDEMIA TYPE: ICD-10-CM

## 2018-10-16 DIAGNOSIS — I10 HYPERTENSION, ESSENTIAL: Primary | ICD-10-CM

## 2018-10-16 LAB
ALBUMIN SERPL-MCNC: 4.6 G/DL (ref 3.5–5.2)
ALBUMIN/GLOB SERPL: 1.8 G/DL
ALP SERPL-CCNC: 89 U/L (ref 39–117)
ALT SERPL W P-5'-P-CCNC: 35 U/L (ref 1–41)
ANION GAP SERPL CALCULATED.3IONS-SCNC: 10.5 MMOL/L
AST SERPL-CCNC: 29 U/L (ref 1–40)
BILIRUB SERPL-MCNC: 0.8 MG/DL (ref 0.1–1.2)
BUN BLD-MCNC: 12 MG/DL (ref 8–23)
BUN/CREAT SERPL: 10.5 (ref 7–25)
CALCIUM SPEC-SCNC: 9.7 MG/DL (ref 8.6–10.5)
CHLORIDE SERPL-SCNC: 104 MMOL/L (ref 98–107)
CHOLEST SERPL-MCNC: 128 MG/DL (ref 0–200)
CO2 SERPL-SCNC: 26.5 MMOL/L (ref 22–29)
CREAT BLD-MCNC: 1.14 MG/DL (ref 0.76–1.27)
GFR SERPL CREATININE-BSD FRML MDRD: 62 ML/MIN/1.73
GLOBULIN UR ELPH-MCNC: 2.6 GM/DL
GLUCOSE BLD-MCNC: 105 MG/DL (ref 65–99)
HDLC SERPL-MCNC: 31 MG/DL (ref 40–60)
LDLC SERPL CALC-MCNC: 59 MG/DL (ref 0–100)
LDLC/HDLC SERPL: 1.91 {RATIO}
POTASSIUM BLD-SCNC: 4.5 MMOL/L (ref 3.5–5.2)
PROT SERPL-MCNC: 7.2 G/DL (ref 6–8.5)
SODIUM BLD-SCNC: 141 MMOL/L (ref 136–145)
TRIGL SERPL-MCNC: 189 MG/DL (ref 0–150)
VLDLC SERPL-MCNC: 37.8 MG/DL (ref 5–40)

## 2018-10-16 PROCEDURE — 90662 IIV NO PRSV INCREASED AG IM: CPT | Performed by: INTERNAL MEDICINE

## 2018-10-16 PROCEDURE — G0008 ADMIN INFLUENZA VIRUS VAC: HCPCS | Performed by: INTERNAL MEDICINE

## 2018-10-16 PROCEDURE — 99214 OFFICE O/P EST MOD 30 MIN: CPT | Performed by: INTERNAL MEDICINE

## 2018-10-16 PROCEDURE — 80053 COMPREHEN METABOLIC PANEL: CPT | Performed by: INTERNAL MEDICINE

## 2018-10-16 PROCEDURE — 80061 LIPID PANEL: CPT | Performed by: INTERNAL MEDICINE

## 2018-10-16 PROCEDURE — 36415 COLL VENOUS BLD VENIPUNCTURE: CPT | Performed by: INTERNAL MEDICINE

## 2018-10-16 RX ORDER — ZOLPIDEM TARTRATE 10 MG/1
10 TABLET ORAL NIGHTLY PRN
Qty: 90 TABLET | Refills: 0 | Status: SHIPPED | OUTPATIENT
Start: 2018-10-16 | End: 2020-02-03

## 2018-10-16 NOTE — PROGRESS NOTES
Subjective   Lucho Blank is a 78 y.o. male.   , Blood pressure and lipids compliant with medications no problems with it.  Does need refill on his Ambien for insomnia also is been having diarrhea for last 1.5 months  History of Present Illness blood pressure satisfactory patient's observation Statin for Cholesterol.  Recheck on That Also Requests Refill on His Ambien She Is  Order Which She Takes When Necessary for Insomnia Daily at Bedtime regarding Diarrhea No Obvious Problems.  Father It Nothing Seems Triggers in Particular.  There Is No Blood or Mucus No Associated Fever No Other Family Members with Similar Illness.  Diarrhea for 1.5mo  Drug allergies to shellfish related products Cipro and penicillin.  Patient's nonsmoker no heavy EtOH reported.  Family history of heart disease hypertension and stroke  Review of Systems   Gastrointestinal: Positive for diarrhea.   All other systems reviewed and are negative.      Objective   Vitals:    10/16/18 0915   BP: 104/70   Pulse: 77   Temp: 98.6 °F (37 °C)   SpO2: 93%   Weight: 107 kg (236 lb 6.4 oz)     Physical Exam   Constitutional: He appears well-developed and well-nourished.   HENT:   Head: Normocephalic and atraumatic.   Eyes: Pupils are equal, round, and reactive to light. Conjunctivae are normal.   Cardiovascular: Normal rate, regular rhythm and normal heart sounds.    Pulmonary/Chest: Effort normal and breath sounds normal.   Abdominal: Soft. Bowel sounds are normal.   Neurological: He is alert.   Unremarkable gait and station   Skin: Skin is warm and dry.   Nursing note and vitals reviewed.      No results found for: INR    Procedures    Assessment/Plan 1.  Hypertension controlled continue present medicine recheck 6 months    2.  Hyperlipidemia plan await lab if satisfactory check in 6 months    3.  Diarrhea of 6 weeks' duration await stool studies if these are negative we'll get GI consult    4.  Insomnia we will do refill of Ambien 10 mg) is a mail  order through [employer] which we'll facilitate    DMuch of this encounter note is an electronic transcription/translation of spoken language to printed text.  The electronic translation of spoken language may permit erroneous, or at times, nonsensical words or phrases to be inadvertently transcribed.  Although I have reviewed the note for such errors, some may still exist. If there are questions or for further clarification, please contact me.    uriah zheng

## 2018-10-19 ENCOUNTER — LAB (OUTPATIENT)
Dept: FAMILY MEDICINE CLINIC | Facility: CLINIC | Age: 78
End: 2018-10-19

## 2018-10-19 DIAGNOSIS — R19.7 DIARRHEA, UNSPECIFIED TYPE: ICD-10-CM

## 2018-10-20 LAB — C DIFF TOX A+B STL QL IA: NEGATIVE

## 2018-10-25 LAB
CAMPYLOBACTER STL CULT: NORMAL
E COLI SXT STL QL IA: NEGATIVE
Lab: NORMAL
Lab: NORMAL
SALM + SHIG STL CULT: NORMAL

## 2018-10-26 LAB
O+P SPEC MICRO: NORMAL
OVA + PARASITE RESULT 1: NORMAL

## 2018-10-30 ENCOUNTER — CLINICAL SUPPORT (OUTPATIENT)
Dept: FAMILY MEDICINE CLINIC | Facility: CLINIC | Age: 78
End: 2018-10-30

## 2018-10-30 PROCEDURE — 90632 HEPA VACCINE ADULT IM: CPT | Performed by: INTERNAL MEDICINE

## 2018-10-30 PROCEDURE — 90471 IMMUNIZATION ADMIN: CPT | Performed by: INTERNAL MEDICINE

## 2018-12-10 RX ORDER — LORATADINE 10 MG/1
TABLET ORAL
Qty: 90 TABLET | Refills: 2 | Status: SHIPPED | OUTPATIENT
Start: 2018-12-10 | End: 2019-09-06 | Stop reason: SDUPTHER

## 2018-12-18 ENCOUNTER — TELEPHONE (OUTPATIENT)
Dept: FAMILY MEDICINE CLINIC | Facility: CLINIC | Age: 78
End: 2018-12-18

## 2018-12-18 NOTE — TELEPHONE ENCOUNTER
PT CALLED IN REGARDS TO HIS WIFE PASSING ON Saturday, AND HE WOULD JUST LIKE TO SPEAK WITH JACINTO DOMINGUEZ ABOUT SOME THINGS REGARDING THE SITUATION

## 2019-03-13 ENCOUNTER — OFFICE VISIT (OUTPATIENT)
Dept: SLEEP MEDICINE | Facility: HOSPITAL | Age: 79
End: 2019-03-13

## 2019-03-13 VITALS
HEIGHT: 73 IN | OXYGEN SATURATION: 94 % | BODY MASS INDEX: 30.09 KG/M2 | WEIGHT: 227 LBS | HEART RATE: 74 BPM | SYSTOLIC BLOOD PRESSURE: 117 MMHG | DIASTOLIC BLOOD PRESSURE: 66 MMHG

## 2019-03-13 DIAGNOSIS — E66.9 OBESITY (BMI 30-39.9): ICD-10-CM

## 2019-03-13 DIAGNOSIS — G47.00 INSOMNIA, UNSPECIFIED TYPE: ICD-10-CM

## 2019-03-13 DIAGNOSIS — G47.33 OBSTRUCTIVE SLEEP APNEA: Primary | ICD-10-CM

## 2019-03-13 PROCEDURE — G0463 HOSPITAL OUTPT CLINIC VISIT: HCPCS

## 2019-03-13 NOTE — PROGRESS NOTES
[facility]  Telephone: [phone] / Fax: [phone] [place]  Telephone: [phone] / Fax: [phone] Carmela Collazo    PCP: Ruel Quinones Jr., MD    Reason for visit: JHON f/u    Lucho Blank is a 78 y.o.male  was last seen at [facility] sleep lab in September 2018. He wake up feeling rested in the morning and denies snoring or gasping respirations on the device. He sleeps from 9pm-6:30am. He has 1 arousal after sleep onset to use the restroom but is able to fall asleep.  He is on Ambien to help him fall asleep at night and has been on it over 1 year. He uses FFM which fits well. He denies significant air leak or dry mouth. He replaces accessories in regular intervals through [employer].  He remains on the Ambien to help him sleep. He lost his wife unexpectedly due to large MI. He takes Xanax to help control the anxiety and Lexapro for depression. Depression/anxiety are controlled.    SH- no tobacco, 2 alc, 7 servings of caffeine per day.    ROS- + anxiety-controlled on xanax PRN. Rest is negative.    Current Medications:    Current Outpatient Medications:   •  ALPRAZolam (XANAX) 2 MG tablet, Take 1 tablet by mouth 3 (Three) Times a Day As Needed for Anxiety. for anxiety, Disp: 270 tablet, Rfl: 0  •  aspirin 81 MG tablet, Take 1 tablet by mouth daily., Disp: , Rfl:   •  atorvastatin (LIPITOR) 20 MG tablet, TAKE 1 TABLET DAILY, Disp: 90 tablet, Rfl: 3  •  Coenzyme Q10 200 MG tablet, Take 1 tablet by mouth daily., Disp: , Rfl:   •  EPINEPHrine (EPIPEN 2-TANNER) 0.3 MG/0.3ML solution auto-injector injection, USE AS DIRECTED, Disp: 1 each, Rfl: 3  •  escitalopram (LEXAPRO) 10 MG tablet, TAKE 1 TABLET DAILY, Disp: 90 tablet, Rfl: 3  •  loratadine (CLARITIN) 10 MG tablet, TAKE 1 TABLET DAILY, Disp: 90 tablet, Rfl: 2  •  Multiple Vitamin (MULTIVITAMIN) capsule, Take 1 capsule by mouth daily., Disp: , Rfl:   •  NIASPAN 750 MG CR tablet, TAKE 2 TABLETS DAILY, Disp: 180 tablet, Rfl: 1  •  Omega-3  "Fatty Acids (FISH OIL) 1200 MG capsule capsule, Take 1 capsule by mouth 2 (two) times a day., Disp: , Rfl:   •  omeprazole (priLOSEC) 40 MG capsule, TAKE 1 CAPSULE DAILY, Disp: 90 capsule, Rfl: 3  •  RESTASIS MULTIDOSE 0.05 % ophthalmic emulsion, INSTILL 1 DROP IN BOTH EYES TWICE A DAY, Disp: 16.5 mL, Rfl: 3  •  valsartan (DIOVAN) 320 MG tablet, TAKE 1 TABLET DAILY, Disp: 90 tablet, Rfl: 3  •  zolpidem (AMBIEN) 10 MG tablet, Take 1 tablet by mouth At Night As Needed for Sleep., Disp: 90 tablet, Rfl: 0   also entered in Sleep Questionnaire    Patient  has a past medical history of Allergic, Anxiety, Anxiety disorder, Cervical pain, Chest pain, History of EKG (03/20/2014), Hyperlipidemia, Hypertension, Increased serum lipids, Insomnia, Left ear pain, Right knee pain, and Sleep disturbance.    I have reviewed the Past Medical History, Past Surgical History, Social History and Family History.            ESS  8   Vital Signs /66   Pulse 74   Ht 185.4 cm (73\")   Wt 103 kg (227 lb)   SpO2 94%   BMI 29.95 kg/m²  Body mass index is 29.95 kg/m².    General Alert and oriented. No acute distress noted   Pharynx/Throat Class IV Mallampati airway, large tongue, no evidence of redundant lateral pharyngeal tissue. No oral lesions. No thrush. Moist mucous membranes.   Head Normocephalic. Symmetrical. Atraumatic.    Nose No septal deviation. No drainage   Chest Wall Normal shape. Symmetric expansion with respiration. No tenderness.   Neck Trachea midline, no thyromegaly or adenopathy    Lungs Clear to auscultation bilaterally. No wheezes. No rhonchi. No rales. Respirations regular, even and unlabored.   Heart Regular rhythm and normal rate. Normal S1 and S2. No murmur   Abdomen Soft, non-tender and non-distended. Normal bowel sounds. No masses.   Extremities Moves all extremities well. No edema   Psychiatric Normal mood and affect.     Testing:  · Download 12/12/18-3/11/19- 98% use with average nightly use of 9 hours and 7 " minutes on auto CPAP 10-15 cm with average pressure of 14.3 cm.  AHI 2.8.    Study:  · 6/10/18  Overnight split polysomnogram study.  Diagnostic from 10:27 PM to 12:59 AM.  Sleep efficiency 51.9% which is poor.  Total sleep time 1.32 hours.  There was absence of slow-wave and REM sleep on the diagnostic study.  AHI index 19.7 with RDI 30.3.  Patient slept in supine position for the entire diagnostic portion.  REM stage sleep was not seen and therefore severity underestimated.  Oxygen saturation remained above 88%.  Arousal index 36 events an hour.  PLM index is not increased.  Patient had 36.66% time snoring.    Titration portion from 12:59 AM to 5:30 AM.  Sleep efficiency was again very poor at 56.9%.  Some improvement in slow-wave and REM sleep to 7% each.  AHI index was improved.  There are indicators for severe in rem stage sleep.  CPAP increased from 5-10 cm water pressure stopped maximum sleep noted at CPAP pressure 10 cm.  There was some stage REM sleep at 8 cm water pressure and AHI index is increased because of this.  Patient may require pressures slightly higher than 10 cm.  Please note the patient notes regarding big oral air leak late in the study.         Impression:  1. Obstructive sleep apnea    2. Obesity (BMI 30-39.9)    3. Insomnia, unspecified type          Plan:  Patient uses the CPAP device and benefits from its use in terms of reduction of hypersomnia and snoring.  Insomnia is well controlled on the Ambien.  AHI appears to be within adequate range. I reviewed download report with the patient. Weight loss will be strongly beneficial to reduce the severity of sleep-disordered breathing.  Caution during activities that require prolonged concentration is strongly advised if sleepiness returns. Changing of PAP supplies regularly is important for effective use. Patient needs to change cushion on the mask or plugs on nasal pillows along with disposable filters once every month and change mask frame,  tubing, headgear and Velcro straps every 6 months at the minimum.       Follow up with Dr. Raymundo in one year    Thank you for allowing me to participate in your patient's care.      THERESA Chacon  [facility]  Phone: [phone]      Part of this note may be an electronic transcription/translation of spoken language to printed text using the Dragon Dictation System.

## 2019-04-11 RX ORDER — ATORVASTATIN CALCIUM 20 MG/1
TABLET, FILM COATED ORAL
Qty: 90 TABLET | Refills: 3 | Status: SHIPPED | OUTPATIENT
Start: 2019-04-11 | End: 2020-04-06

## 2019-04-11 RX ORDER — VALSARTAN 320 MG/1
TABLET ORAL
Qty: 90 TABLET | Refills: 3 | Status: SHIPPED | OUTPATIENT
Start: 2019-04-11 | End: 2020-06-01

## 2019-04-16 ENCOUNTER — OFFICE VISIT (OUTPATIENT)
Dept: FAMILY MEDICINE CLINIC | Facility: CLINIC | Age: 79
End: 2019-04-16

## 2019-04-16 VITALS
SYSTOLIC BLOOD PRESSURE: 130 MMHG | OXYGEN SATURATION: 96 % | HEART RATE: 75 BPM | TEMPERATURE: 99.1 F | BODY MASS INDEX: 30.22 KG/M2 | WEIGHT: 228 LBS | HEIGHT: 73 IN | DIASTOLIC BLOOD PRESSURE: 86 MMHG

## 2019-04-16 DIAGNOSIS — E78.49 OTHER HYPERLIPIDEMIA: ICD-10-CM

## 2019-04-16 DIAGNOSIS — I10 HYPERTENSION, ESSENTIAL: Primary | ICD-10-CM

## 2019-04-16 DIAGNOSIS — M79.671 FOOT PAIN, RIGHT: ICD-10-CM

## 2019-04-16 DIAGNOSIS — M65.311 TRIGGER FINGER OF RIGHT THUMB: ICD-10-CM

## 2019-04-16 LAB
ALBUMIN SERPL-MCNC: 4.3 G/DL (ref 3.5–5.2)
ALBUMIN/GLOB SERPL: 1.4 G/DL
ALP SERPL-CCNC: 89 U/L (ref 39–117)
ALT SERPL W P-5'-P-CCNC: 31 U/L (ref 1–41)
ANION GAP SERPL CALCULATED.3IONS-SCNC: 12.5 MMOL/L
AST SERPL-CCNC: 30 U/L (ref 1–40)
BILIRUB SERPL-MCNC: 0.8 MG/DL (ref 0.2–1.2)
BUN BLD-MCNC: 15 MG/DL (ref 8–23)
BUN/CREAT SERPL: 14.6 (ref 7–25)
CALCIUM SPEC-SCNC: 10.2 MG/DL (ref 8.6–10.5)
CHLORIDE SERPL-SCNC: 102 MMOL/L (ref 98–107)
CHOLEST SERPL-MCNC: 134 MG/DL (ref 0–200)
CO2 SERPL-SCNC: 26.5 MMOL/L (ref 22–29)
CREAT BLD-MCNC: 1.03 MG/DL (ref 0.76–1.27)
GFR SERPL CREATININE-BSD FRML MDRD: 70 ML/MIN/1.73
GLOBULIN UR ELPH-MCNC: 3 GM/DL
GLUCOSE BLD-MCNC: 104 MG/DL (ref 65–99)
HDLC SERPL-MCNC: 33 MG/DL (ref 40–60)
LDLC SERPL CALC-MCNC: 69 MG/DL (ref 0–100)
LDLC/HDLC SERPL: 2.09 {RATIO}
POTASSIUM BLD-SCNC: 4.5 MMOL/L (ref 3.5–5.2)
PROT SERPL-MCNC: 7.3 G/DL (ref 6–8.5)
SODIUM BLD-SCNC: 141 MMOL/L (ref 136–145)
TRIGL SERPL-MCNC: 160 MG/DL (ref 0–150)
VLDLC SERPL-MCNC: 32 MG/DL (ref 5–40)

## 2019-04-16 PROCEDURE — 36415 COLL VENOUS BLD VENIPUNCTURE: CPT | Performed by: INTERNAL MEDICINE

## 2019-04-16 PROCEDURE — 99214 OFFICE O/P EST MOD 30 MIN: CPT | Performed by: INTERNAL MEDICINE

## 2019-04-16 PROCEDURE — 73630 X-RAY EXAM OF FOOT: CPT | Performed by: INTERNAL MEDICINE

## 2019-04-16 PROCEDURE — 73130 X-RAY EXAM OF HAND: CPT | Performed by: INTERNAL MEDICINE

## 2019-04-16 PROCEDURE — 80061 LIPID PANEL: CPT | Performed by: INTERNAL MEDICINE

## 2019-04-16 PROCEDURE — 80053 COMPREHEN METABOLIC PANEL: CPT | Performed by: INTERNAL MEDICINE

## 2019-04-16 NOTE — PROGRESS NOTES
Subjective   Lucho Blank is a 78 y.o. male.   Follow-up on blood pressure lipids also problems with thumb as well as right third toe.  History of Present Illness   Doing fairly well no specific complaints is refills on medications needs updated labs as well.  Recent left thumb pain as well as right third toe pain without specific injury as well patient is building with recent loss of spouse and family think he is doing fairly well with this regard.  Specific injury to the left hand especially thumb or right third toe.  No need for any medication changes.  He will be moving into an assisted living facility basically just clean after and still very functional viable individual.  Patient is a non-smoker family history for heart disease lung disease hypertension diabetes cancer unspecified and stroke drug allergies Cipro causing nausea penicillin causing hives and shellfish derived products causing hives  Review of Systems   All other systems reviewed and are negative.      Objective   Vitals:    04/16/19 0938   BP: 130/86   Pulse: 75   Temp: 99.1 °F (37.3 °C)   SpO2: 96%   Weight: 103 kg (228 lb)     Physical Exam   Constitutional: He appears well-developed and well-nourished.   HENT:   Head: Normocephalic and atraumatic.   Eyes: Conjunctivae are normal. Pupils are equal, round, and reactive to light.   Cardiovascular: Normal rate, regular rhythm and normal heart sounds.   Pulmonary/Chest: Effort normal and breath sounds normal.   Abdominal: Soft. Bowel sounds are normal.   Musculoskeletal:   Left thumb without overt swelling hands unremarkable good  has pain and what probably is a mild trigger finger at the left thumb at the IP joint normal discomfort with MCP.    Right foot is unremarkable dorsalis pedis posterior tibial pulses are 2+ has pain over the third toe MCP etiology not clear no increased pain with compression metatarsal heads.  Able flex and extend toes office well.   Neurological: He is alert.    Unremarkable gait and station   Skin: Skin is warm and dry.   Nursing note and vitals reviewed.      No results found for: INR    Procedures  X-ray left hand 3 views obtained.  No comparison available.  No bony or soft tissue normalities are noted.    X-ray left foot 3 views obtained as well.  No acute bony or soft tissue normalities noticed pain particular attention to the third toe was up to the fourth toe no comparison available.  Assessment/Plan   1.  Hypertension controlled continue present medicine recheck 6 months time    2.  Hyperlipidemia weight pending lab is satisfactory recheck 6 months    3.  Trigger finger right thumb plan did suggest hand surgery referral patient is busy now wishes to wait till after he is done things with the next several weeks time will consider let us know when he is ready to have hand surgeon see him.    4.  Right third toe pain suggested podiatrist patient will wait on this admission Dr. العلي name    Much of this encounter note is an electronic transcription/translation of spoken language to printed text.  The electronic translation of spoken language may permit erroneous, or at times, nonsensical words or phrases to be inadvertently transcribed.  Although I have reviewed the note for such errors, some may still exist. If there are questions or for further clarification, please contact me.

## 2019-04-22 RX ORDER — OMEPRAZOLE 40 MG/1
CAPSULE, DELAYED RELEASE ORAL
Qty: 90 CAPSULE | Refills: 3 | Status: SHIPPED | OUTPATIENT
Start: 2019-04-22 | End: 2020-07-09

## 2019-04-24 ENCOUNTER — TELEPHONE (OUTPATIENT)
Dept: FAMILY MEDICINE CLINIC | Facility: CLINIC | Age: 79
End: 2019-04-24

## 2019-04-24 RX ORDER — CYCLOSPORINE 0.5 MG/ML
1 EMULSION OPHTHALMIC EVERY 12 HOURS
Qty: 16.5 ML | Refills: 3 | Status: SHIPPED | OUTPATIENT
Start: 2019-04-24 | End: 2020-07-09

## 2019-09-06 RX ORDER — LORATADINE 10 MG/1
TABLET ORAL
Qty: 90 TABLET | Refills: 4 | Status: SHIPPED | OUTPATIENT
Start: 2019-09-06 | End: 2020-11-30

## 2019-09-13 ENCOUNTER — DOCUMENTATION (OUTPATIENT)
Dept: SLEEP MEDICINE | Facility: HOSPITAL | Age: 79
End: 2019-09-13

## 2019-09-13 ENCOUNTER — TELEPHONE (OUTPATIENT)
Dept: SLEEP MEDICINE | Facility: HOSPITAL | Age: 79
End: 2019-09-13

## 2019-09-13 NOTE — TELEPHONE ENCOUNTER
Left message for patient stating he pressure had been changed and th change out his mask and hose on his machine. Pressure changed via modem 10-20.

## 2019-09-13 NOTE — PROGRESS NOTES
Download received from [employer]: Recent AHI as high as 24.  Central apnea index not increased.  Median pressure is 13 cm.  No significant leaks are seen.  Based on previous notes patient seems to be set on auto CPAP 10 to 15 cm.  Previously AHI was only 2.8.  I will ask patient to change all supplies including the hose.  Also change auto CPAP to 10 to 20 cm and review download in 1 month.

## 2019-10-03 ENCOUNTER — OFFICE VISIT (OUTPATIENT)
Dept: FAMILY MEDICINE CLINIC | Facility: CLINIC | Age: 79
End: 2019-10-03

## 2019-10-03 VITALS
OXYGEN SATURATION: 95 % | HEIGHT: 73 IN | TEMPERATURE: 98.5 F | DIASTOLIC BLOOD PRESSURE: 78 MMHG | SYSTOLIC BLOOD PRESSURE: 106 MMHG | BODY MASS INDEX: 31.09 KG/M2 | HEART RATE: 77 BPM | WEIGHT: 234.6 LBS

## 2019-10-03 DIAGNOSIS — G47.00 INSOMNIA, UNSPECIFIED TYPE: ICD-10-CM

## 2019-10-03 DIAGNOSIS — E78.5 HYPERLIPIDEMIA, UNSPECIFIED HYPERLIPIDEMIA TYPE: ICD-10-CM

## 2019-10-03 DIAGNOSIS — I10 HYPERTENSION, ESSENTIAL: ICD-10-CM

## 2019-10-03 DIAGNOSIS — Z79.899 HIGH RISK MEDICATION USE: ICD-10-CM

## 2019-10-03 DIAGNOSIS — Z00.00 MEDICARE ANNUAL WELLNESS VISIT, SUBSEQUENT: Primary | ICD-10-CM

## 2019-10-03 DIAGNOSIS — F41.9 ANXIETY DISORDER, UNSPECIFIED TYPE: ICD-10-CM

## 2019-10-03 DIAGNOSIS — Z23 IMMUNIZATION DUE: ICD-10-CM

## 2019-10-03 LAB
ALBUMIN SERPL-MCNC: 4.6 G/DL (ref 3.5–5.2)
ALBUMIN/GLOB SERPL: 1.7 G/DL
ALP SERPL-CCNC: 98 U/L (ref 39–117)
ALT SERPL W P-5'-P-CCNC: 35 U/L (ref 1–41)
ANION GAP SERPL CALCULATED.3IONS-SCNC: 11.6 MMOL/L (ref 5–15)
AST SERPL-CCNC: 28 U/L (ref 1–40)
BILIRUB SERPL-MCNC: 1.2 MG/DL (ref 0.2–1.2)
BUN BLD-MCNC: 11 MG/DL (ref 8–23)
BUN/CREAT SERPL: 9.9 (ref 7–25)
CALCIUM SPEC-SCNC: 9.4 MG/DL (ref 8.6–10.5)
CHLORIDE SERPL-SCNC: 104 MMOL/L (ref 98–107)
CHOLEST SERPL-MCNC: 121 MG/DL (ref 0–200)
CO2 SERPL-SCNC: 28.4 MMOL/L (ref 22–29)
CREAT BLD-MCNC: 1.11 MG/DL (ref 0.76–1.27)
GFR SERPL CREATININE-BSD FRML MDRD: 64 ML/MIN/1.73
GLOBULIN UR ELPH-MCNC: 2.7 GM/DL
GLUCOSE BLD-MCNC: 94 MG/DL (ref 65–99)
HDLC SERPL-MCNC: 32 MG/DL (ref 40–60)
LDLC SERPL CALC-MCNC: 49 MG/DL (ref 0–100)
LDLC/HDLC SERPL: 1.53 {RATIO}
POTASSIUM BLD-SCNC: 4.7 MMOL/L (ref 3.5–5.2)
PROT SERPL-MCNC: 7.3 G/DL (ref 6–8.5)
SODIUM BLD-SCNC: 144 MMOL/L (ref 136–145)
TRIGL SERPL-MCNC: 200 MG/DL (ref 0–150)
VLDLC SERPL-MCNC: 40 MG/DL (ref 5–40)

## 2019-10-03 PROCEDURE — 36415 COLL VENOUS BLD VENIPUNCTURE: CPT | Performed by: INTERNAL MEDICINE

## 2019-10-03 PROCEDURE — 99214 OFFICE O/P EST MOD 30 MIN: CPT | Performed by: INTERNAL MEDICINE

## 2019-10-03 PROCEDURE — 80061 LIPID PANEL: CPT | Performed by: INTERNAL MEDICINE

## 2019-10-03 PROCEDURE — G0439 PPPS, SUBSEQ VISIT: HCPCS | Performed by: INTERNAL MEDICINE

## 2019-10-03 PROCEDURE — G0008 ADMIN INFLUENZA VIRUS VAC: HCPCS | Performed by: INTERNAL MEDICINE

## 2019-10-03 PROCEDURE — 80053 COMPREHEN METABOLIC PANEL: CPT | Performed by: INTERNAL MEDICINE

## 2019-10-03 PROCEDURE — 90653 IIV ADJUVANT VACCINE IM: CPT | Performed by: INTERNAL MEDICINE

## 2019-10-03 NOTE — PROGRESS NOTES
The ABCs of the Annual Wellness Visit  Subsequent Medicare Wellness Visit    Chief Complaint   Patient presents with   • Follow-up       Subjective   History of Present Illness:  Lucho Blank is a 79 y.o. male who presents for a Subsequent Medicare Wellness Visit.    HEALTH RISK ASSESSMENT    Recent Hospitalizations:  No hospitalization(s) within the last year.    Current Medical Providers:  Patient Care Team:  Ruel Quinones Jr., MD as PCP - General  Ruel Quinones Jr., MD as PCP - Claims Attributed  Tashi Alonzo MD as Consulting Physician (Ophthalmology)  Camilla Billy MD as Consulting Physician (Dermatology)  Johnie Chan Jr., MD as Consulting Physician (Urology)    Smoking Status:  Social History     Tobacco Use   Smoking Status Never Smoker   Smokeless Tobacco Never Used       Alcohol Consumption:  Social History     Substance and Sexual Activity   Alcohol Use Yes    Comment: occasional       Depression Screen:   PHQ-2/PHQ-9 Depression Screening 4/16/2019   Little interest or pleasure in doing things 1   Feeling down, depressed, or hopeless 1   Trouble falling or staying asleep, or sleeping too much 0   Feeling tired or having little energy 0   Poor appetite or overeating 0   Feeling bad about yourself - or that you are a failure or have let yourself or your family down 0   Trouble concentrating on things, such as reading the newspaper or watching television 0   Moving or speaking so slowly that other people could have noticed. Or the opposite - being so fidgety or restless that you have been moving around a lot more than usual 0   Thoughts that you would be better off dead, or of hurting yourself in some way 0   Total Score 2   If you checked off any problems, how difficult have these problems made it for you to do your work, take care of things at home, or get along with other people? Somewhat difficult       Fall Risk Screen:  RANDEEADI Fall Risk Assessment has not been completed.    Health  Habits and Functional and Cognitive Screening:  Functional & Cognitive Status 10/17/2017   Do you have difficulty preparing food and eating? No   Do you have difficulty bathing yourself, getting dressed or grooming yourself? No   Do you have difficulty using the toilet? No   Do you have difficulty moving around from place to place? No   Current Diet Well Balanced Diet   Dental Exam Up to date   Eye Exam Up to date   Exercise (times per week) 7 times per week   Current Exercise Activities Include Walking   Do you need help using the phone?  No   Are you deaf or do you have serious difficulty hearing?  No   Do you need help with transportation? No   Do you need help shopping? No   Do you need help preparing meals?  No   Do you need help with housework?  No   Do you need help with laundry? No   Do you need help taking your medications? No   Do you need help managing money? No   Do you have difficulty concentrating, remembering or making decisions? No         Does the patient have evidence of cognitive impairment? No    Asprin use counseling:Taking ASA appropriately as indicated    Age-appropriate Screening Schedule:  Refer to the list below for future screening recommendations based on patient's age, sex and/or medical conditions. Orders for these recommended tests are listed in the plan section. The patient has been provided with a written plan.    Health Maintenance   Topic Date Due   • ZOSTER VACCINE (2 of 3) 02/26/2009   • COLONOSCOPY  03/21/2016   • INFLUENZA VACCINE  08/01/2019   • LIPID PANEL  04/16/2020   • TDAP/TD VACCINES (2 - Td) 02/04/2029   • PNEUMOCOCCAL VACCINES (65+ LOW/MEDIUM RISK)  Completed          The following portions of the patient's history were reviewed and updated as appropriate: allergies, current medications, past family history, past medical history, past social history, past surgical history and problem list.    Outpatient Medications Prior to Visit   Medication Sig Dispense Refill   •  ALPRAZolam (XANAX) 2 MG tablet Take 1 tablet by mouth 3 (Three) Times a Day As Needed for Anxiety. for anxiety 270 tablet 0   • aspirin 81 MG tablet Take 1 tablet by mouth daily.     • atorvastatin (LIPITOR) 20 MG tablet TAKE 1 TABLET DAILY 90 tablet 3   • Coenzyme Q10 200 MG tablet Take 1 tablet by mouth daily.     • cycloSPORINE (RESTASIS MULTIDOSE) 0.05 % ophthalmic emulsion Administer 1 drop to both eyes Every 12 (Twelve) Hours. 16.5 mL 3   • EPINEPHrine (EPIPEN 2-TANNER) 0.3 MG/0.3ML solution auto-injector injection USE AS DIRECTED 1 each 3   • escitalopram (LEXAPRO) 10 MG tablet TAKE 1 TABLET DAILY 90 tablet 3   • loratadine (CLARITIN) 10 MG tablet TAKE 1 TABLET DAILY 90 tablet 4   • Multiple Vitamin (MULTIVITAMIN) capsule Take 1 capsule by mouth daily.     • NIASPAN 750 MG CR tablet TAKE 2 TABLETS DAILY 180 tablet 1   • Omega-3 Fatty Acids (FISH OIL) 1200 MG capsule capsule Take 1 capsule by mouth 2 (two) times a day.     • omeprazole (priLOSEC) 40 MG capsule TAKE 1 CAPSULE DAILY 90 capsule 3   • valsartan (DIOVAN) 320 MG tablet TAKE 1 TABLET DAILY 90 tablet 3   • zolpidem (AMBIEN) 10 MG tablet Take 1 tablet by mouth At Night As Needed for Sleep. 90 tablet 0     No facility-administered medications prior to visit.        There is no problem list on file for this patient.      Advanced Care Planning:  Patient has an advance directive - a copy has been provided and is visible in patient header    Review of Systems    Compared to one year ago, the patient feels his physical health is the same.  Compared to one year ago, the patient feels his mental health is the same.    Reviewed chart for potential of high risk medication in the elderly: not applicable  Reviewed chart for potential of harmful drug interactions in the elderly:not applicable    Objective         Vitals:    10/03/19 0832   BP: 106/78   BP Location: Left arm   Patient Position: Sitting   Cuff Size: Adult   Pulse: 77   Temp: 98.5 °F (36.9 °C)  "  TempSrc: Oral   SpO2: 95%   Weight: 106 kg (234 lb 9.6 oz)   Height: 185.4 cm (73\")   PainSc: 0-No pain       Body mass index is 30.95 kg/m².  Discussed the patient's BMI with him. The BMI is above average; BMI management plan is completed.    Physical Exam          Assessment/Plan   Medicare Risks and Personalized Health Plan  CMS Preventative Services Quick Reference  Depression/Dysphoria  Immunizations Discussed/Encouraged (specific immunizations; Influenza )    The above risks/problems have been discussed with the patient.  Pertinent information has been shared with the patient in the After Visit Summary.  Follow up plans and orders are seen below in the Assessment/Plan Section.    There are no diagnoses linked to this encounter.  Follow Up:  No Follow-up on file.     An After Visit Summary and PPPS were given to the patient.             "

## 2019-10-03 NOTE — PATIENT INSTRUCTIONS
Medicare Wellness  Personal Prevention Plan of Service     Date of Office Visit:  10/03/2019  Encounter Provider:  Ruel Quinones MD  Place of Service:  [facility]  Patient Name: Lucho Blank  :  1940    As part of the Medicare Wellness portion of your visit today, we are providing you with this personalized preventive plan of services (PPPS). This plan is based upon recommendations of the [place] Preventive Services Task Force (USPSTF) and the Advisory Committee on Immunization Practices (ACIP).    This lists the preventive care services that should be considered, and provides dates of when you are due. Items listed as completed are up-to-date and do not require any further intervention.    Health Maintenance   Topic Date Due   • ZOSTER VACCINE (2 of 3) 2009   • COLONOSCOPY  2016   • INFLUENZA VACCINE  2019   • LIPID PANEL  2020   • MEDICARE ANNUAL WELLNESS  10/03/2020   • TDAP/TD VACCINES (2 - Td) 2029   • PNEUMOCOCCAL VACCINES (65+ LOW/MEDIUM RISK)  Completed       No orders of the defined types were placed in this encounter.      No Follow-up on file.

## 2019-10-03 NOTE — PROGRESS NOTES
Subjective   Lucho Blank is a 79 y.o. male.   Medicare wellness visit subsequent also follow-up on blood pressure and lipids.  History of Present Illness   Medicare wellness visit subsequent also follow-up on blood pressure lipids needs labs obtained.  No updated urine drug screen [facility] drug contract on patient's medicines for insomnia and anxiety.  Continues to do fairly well with these.  Patient overall still doing well also with spouse in December this past year overall is doing fairly well still somewhat down but is functional is going family events he and family both feel like he is doing okay.  We will keep his medicine going as is just a flu shot today as well.  No other specific complaints with patient      Cipro causing nausea only, penicillin causing hives and shellfish try Prilosec also causing hives.  Patient is non-smoker.  Family is positive for heart disease lung disease hypertension diabetes cancer unspecified and stroke  Review of Systems   All other systems reviewed and are negative.      Objective   Vitals:    10/03/19 0832   BP: 106/78   Pulse: 77   Temp: 98.5 °F (36.9 °C)   SpO2: 95%   Weight: 106 kg (234 lb 9.6 oz)     Physical Exam   Constitutional: He appears well-developed and well-nourished.   HENT:   Head: Normocephalic and atraumatic.   Eyes: Conjunctivae are normal. Pupils are equal, round, and reactive to light.   Cardiovascular: Normal rate, regular rhythm and normal heart sounds.   Pulmonary/Chest: Effort normal and breath sounds normal.   Abdominal: Soft. Bowel sounds are normal.   Neurological: He is alert.   Unremarkable gait and station   Skin: Skin is warm and dry.   Psychiatric: He has a normal mood and affect. His behavior is normal.   Nursing note and vitals reviewed.      No results found for: INR    Procedures    Assessment/Plan     1.  Medicare wellness subsequent    2.  Hypertension controlled by history continue same with recheck 6 months    3.  Hyperlipidemia weight  pending lab    4.  Immunization update with flu shot given    5.  Insomnia    6.  Anxiety disorder    7.  Medication monitoring with [facility] drug contract.    Much of this encounter note is an electronic transcription/translation of spoken language to printed text.  The electronic translation of spoken language may permit erroneous, or at times, nonsensical words or phrases to be inadvertently transcribed.  Although I have reviewed the note for such errors, some may still exist. If there are questions or for further clarification, please contact me.

## 2019-10-07 ENCOUNTER — TELEPHONE (OUTPATIENT)
Dept: FAMILY MEDICINE CLINIC | Facility: CLINIC | Age: 79
End: 2019-10-07

## 2019-10-07 RX ORDER — ESCITALOPRAM OXALATE 10 MG/1
TABLET ORAL
Qty: 90 TABLET | Refills: 4 | Status: SHIPPED | OUTPATIENT
Start: 2019-10-07 | End: 2020-12-22 | Stop reason: SDUPTHER

## 2019-10-10 LAB — CONV REPORT SUMMARY: NORMAL

## 2019-11-05 ENCOUNTER — TELEPHONE (OUTPATIENT)
Dept: SLEEP MEDICINE | Facility: HOSPITAL | Age: 79
End: 2019-11-05

## 2019-11-05 NOTE — TELEPHONE ENCOUNTER
----- Message from Joshua Larry sent at 11/5/2019  8:30 AM EST -----  Left message for pt  ----- Message -----  From: Camilla Diaz APRN  Sent: 11/1/2019  10:22 AM  To: Joshua Larry please let patient know I looked at his recent smart card d/l per request of Dr. Raymundo. His sleep apnea is well controlled at present-on current pressure of 10-20cm. Ask him to continue using the machine nightly.

## 2020-01-03 DIAGNOSIS — R35.0 URINE FREQUENCY: Primary | ICD-10-CM

## 2020-01-03 PROCEDURE — 80061 LIPID PANEL: CPT | Performed by: INTERNAL MEDICINE

## 2020-01-03 PROCEDURE — 80053 COMPREHEN METABOLIC PANEL: CPT | Performed by: INTERNAL MEDICINE

## 2020-01-06 RX ORDER — ICOSAPENT ETHYL 1000 MG/1
2 CAPSULE ORAL 2 TIMES DAILY WITH MEALS
Qty: 360 CAPSULE | Refills: 0 | Status: SHIPPED | OUTPATIENT
Start: 2020-01-06 | End: 2020-02-07 | Stop reason: SDUPTHER

## 2020-01-27 ENCOUNTER — TELEPHONE (OUTPATIENT)
Dept: FAMILY MEDICINE CLINIC | Facility: CLINIC | Age: 80
End: 2020-01-27

## 2020-01-27 NOTE — TELEPHONE ENCOUNTER
Pt was given rx for vascepa and it says there is an interaction with him having a severe allergy to shellfish. Please advise

## 2020-02-03 ENCOUNTER — TELEPHONE (OUTPATIENT)
Dept: FAMILY MEDICINE CLINIC | Facility: CLINIC | Age: 80
End: 2020-02-03

## 2020-02-03 RX ORDER — ZOLPIDEM TARTRATE 10 MG/1
10 TABLET ORAL NIGHTLY PRN
Qty: 90 TABLET | Refills: 0 | Status: SHIPPED | OUTPATIENT
Start: 2020-02-03 | End: 2020-05-23

## 2020-02-03 RX ORDER — ZOLPIDEM TARTRATE 10 MG/1
10 TABLET ORAL NIGHTLY PRN
Qty: 10 TABLET | Refills: 0 | Status: SHIPPED | OUTPATIENT
Start: 2020-02-03 | End: 2020-02-03

## 2020-02-03 NOTE — TELEPHONE ENCOUNTER
1- NEEDS NEW RX/ zolpidem (AMBIEN) 10 MG tablet SENT TO [facility], HE IS TOTALLY OUT SO NEEDS 1 WEEK SUPPLY SENT TO [facility]. 2-  HE HAS BEEN HAVING PROBLEMS GETTING HIS icosapent ethyl (VASCEPA) 1 g capsule capsule FILLED, THEY SAY ITS OUT OF STOCK SO HE HAS STILL BEEN TAKING HIS FISH OIL.

## 2020-02-03 NOTE — TELEPHONE ENCOUNTER
Done on the Ambien.  Regarding vascepa do not have any suggestions on what he is already doing switch to 3 g of fish oil a day as a substitute

## 2020-02-05 ENCOUNTER — TELEPHONE (OUTPATIENT)
Dept: FAMILY MEDICINE CLINIC | Facility: CLINIC | Age: 80
End: 2020-02-05

## 2020-02-07 ENCOUNTER — TELEPHONE (OUTPATIENT)
Dept: FAMILY MEDICINE CLINIC | Facility: CLINIC | Age: 80
End: 2020-02-07

## 2020-02-07 RX ORDER — ICOSAPENT ETHYL 1000 MG/1
2 CAPSULE ORAL 2 TIMES DAILY WITH MEALS
Qty: 360 CAPSULE | Refills: 0 | Status: SHIPPED | OUTPATIENT
Start: 2020-02-07 | End: 2020-02-11 | Stop reason: SDUPTHER

## 2020-02-07 NOTE — TELEPHONE ENCOUNTER
Pt states we need to call regarding PA on Vascepa. If you contact them the price will go 300$ to 24$ but you have to contact this number, no faxes. Please contact patient when done.    [phone]

## 2020-02-11 ENCOUNTER — TELEPHONE (OUTPATIENT)
Dept: FAMILY MEDICINE CLINIC | Facility: CLINIC | Age: 80
End: 2020-02-11

## 2020-02-11 RX ORDER — ICOSAPENT ETHYL 1000 MG/1
2 CAPSULE ORAL 2 TIMES DAILY WITH MEALS
Qty: 360 CAPSULE | Refills: 1 | Status: SHIPPED | OUTPATIENT
Start: 2020-02-11 | End: 2020-09-03

## 2020-02-11 NOTE — TELEPHONE ENCOUNTER
Pt needs a pres for icosapent ethyl (VASCEPA) 1 g capsule capsule 90 day supply with refills       [facility] to  [facility]

## 2020-03-06 ENCOUNTER — OFFICE VISIT (OUTPATIENT)
Dept: SLEEP MEDICINE | Facility: HOSPITAL | Age: 80
End: 2020-03-06

## 2020-03-06 VITALS
DIASTOLIC BLOOD PRESSURE: 69 MMHG | WEIGHT: 232 LBS | HEART RATE: 92 BPM | SYSTOLIC BLOOD PRESSURE: 119 MMHG | HEIGHT: 73 IN | BODY MASS INDEX: 30.75 KG/M2 | OXYGEN SATURATION: 95 %

## 2020-03-06 DIAGNOSIS — E66.9 OBESITY (BMI 30-39.9): ICD-10-CM

## 2020-03-06 DIAGNOSIS — G47.33 OBSTRUCTIVE SLEEP APNEA: Primary | ICD-10-CM

## 2020-03-06 DIAGNOSIS — G47.00 INSOMNIA, UNSPECIFIED TYPE: ICD-10-CM

## 2020-03-06 PROCEDURE — G0463 HOSPITAL OUTPT CLINIC VISIT: HCPCS

## 2020-03-06 NOTE — PROGRESS NOTES
[facility]  [address] [facility]  3RD FLOOR  [facility] [address]  [phone]    PCP: Ruel Quinones Jr., MD    Reason for visit:  Sleep disorders: JHON    Lucho is a 79 y.o.male who was seen in the [facility] today. Since last visit we increased to auto 10-20 cms. He has gained some wt in past 1 yr, only 5 lbs. He wakes up rested and refreshed. He sleeps from 10pm to 6:15am. He uses ffm, fits well. No air leaks or dry mouth. BP meds changed since last visit. He remains on Ambien 10 mg qhs to help him sleep.  Purcell Sleepiness Scale is 4. Caffeine 3 per day. Alcohol 1 per week.    Lucho  reports that he has never smoked. He has never used smokeless tobacco.    Pertinent Positive Review of Systems of anxiety  Rest of Review of Systems was negative as recorded in Sleep Questionnaire.    Patient  has a past medical history of Allergic, Anxiety, Anxiety disorder, Cervical pain, Chest pain, History of EKG (03/20/2014), Hyperlipidemia, Hypertension, Increased serum lipids, Insomnia, Left ear pain, Right knee pain, and Sleep disturbance.     Current Medications:    Current Outpatient Medications:   •  ALPRAZolam (XANAX) 2 MG tablet, Take 1 tablet by mouth 3 (Three) Times a Day As Needed for Anxiety. for anxiety, Disp: 270 tablet, Rfl: 0  •  aspirin 81 MG tablet, Take 1 tablet by mouth daily., Disp: , Rfl:   •  atorvastatin (LIPITOR) 20 MG tablet, TAKE 1 TABLET DAILY, Disp: 90 tablet, Rfl: 3  •  Coenzyme Q10 200 MG tablet, Take 1 tablet by mouth daily., Disp: , Rfl:   •  cycloSPORINE (RESTASIS MULTIDOSE) 0.05 % ophthalmic emulsion, Administer 1 drop to both eyes Every 12 (Twelve) Hours., Disp: 16.5 mL, Rfl: 3  •  EPINEPHrine (EPIPEN 2-TANNER) 0.3 MG/0.3ML solution auto-injector injection, USE AS DIRECTED, Disp: 1 each, Rfl: 3  •  escitalopram (LEXAPRO) 10 MG tablet, TAKE 1 TABLET DAILY, Disp: 90 tablet, Rfl: 4  •  icosapent ethyl (VASCEPA) 1 g capsule capsule, Take 2 g by mouth 2 (Two)  "Times a Day With Meals., Disp: 360 capsule, Rfl: 1  •  loratadine (CLARITIN) 10 MG tablet, TAKE 1 TABLET DAILY, Disp: 90 tablet, Rfl: 4  •  Multiple Vitamin (MULTIVITAMIN) capsule, Take 1 capsule by mouth daily., Disp: , Rfl:   •  NIASPAN 750 MG CR tablet, TAKE 2 TABLETS DAILY, Disp: 180 tablet, Rfl: 1  •  Omega-3 Fatty Acids (FISH OIL) 1200 MG capsule capsule, Take 1 capsule by mouth 2 (two) times a day., Disp: , Rfl:   •  omeprazole (priLOSEC) 40 MG capsule, TAKE 1 CAPSULE DAILY, Disp: 90 capsule, Rfl: 3  •  valsartan (DIOVAN) 320 MG tablet, TAKE 1 TABLET DAILY, Disp: 90 tablet, Rfl: 3  •  zolpidem (AMBIEN) 10 MG tablet, Take 1 tablet by mouth At Night As Needed for Sleep., Disp: 90 tablet, Rfl: 0   also entered in Sleep Questionnaire         Vital Signs: /69 (BP Location: Left arm, Patient Position: Sitting)   Pulse 92   Ht 185.4 cm (73\")   Wt 105 kg (232 lb)   SpO2 95%   BMI 30.61 kg/m²     Body mass index is 30.61 kg/m².       Tongue: normal      Dentition: good       Pharynx: Posterior pharyngeal pillars are narrow   Mallampatti: II (hard and soft palate, upper portion of tonsils anduvula visible)        General: Alert. Cooperative. Well developed. No acute distress.             Head:  Normocephalic. Symmetrical. Atraumatic.              Nose: No septal deviation. No drainage.          Throat: No oral lesions. No thrush. Moist mucous membranes.    Chest Wall:  Normal shape. Symmetric expansion with respiration. No tenderness.             Neck:  Trachea midline.           Lungs:  Clear to auscultation bilaterally. No wheezes. No rhonchi. No rales. Respirations regular, even and unlabored.            Heart:  Regular rhythm and normal rate. Normal S1 and S2. No murmur.     Abdomen:  Soft, non-tender and non-distended. Normal bowel sounds. No masses.  Extremities:  Moves all extremities well. No edema.    Psychiatric: Normal mood and affect.    Study:  · 6/10/18  Overnight split polysomnogram " study.  Diagnostic from 10:27 PM to 12:59 AM.  Sleep efficiency 51.9% which is poor.  Total sleep time 1.32 hours.  There was absence of slow-wave and REM sleep on the diagnostic study.  AHI index 19.7 with RDI 30.3.  Patient slept in supine position for the entire diagnostic portion.  REM stage sleep was not seen and therefore severity underestimated.  Oxygen saturation remained above 88%.  Arousal index 36 events an hour.  PLM index is not increased.  Patient had 36.66% time snoring.  Titration portion from 12:59 AM to 5:30 AM.  Sleep efficiency was again very poor at 56.9%.  Some improvement in slow-wave and REM sleep to 7% each.  AHI index was improved.  There are indicators for severe in rem stage sleep.  CPAP increased from 5-10 cm water pressure stopped maximum sleep noted at CPAP pressure 10 cm.  There was some stage REM sleep at 8 cm water pressure and AHI index is increased because of this.  Patient may require pressures slightly higher than 10 cm.  Please note the patient notes regarding big oral air leak late in the study.    Testing:  · 90% compliance; avg 8hrs 39 mins; ahi 1.8; avg pr 17.5; auto 10-20    DME Company: [employer]    Impression:  1. Obstructive sleep apnea    2. Obesity (BMI 30-39.9)    3. Insomnia, unspecified type        Plan:  Lucho is compliant and benefits from use of CPAP. Wakes up rested and refreshed. Advised to change supplies regularly.    Insomnia on Ambien through PCP.    Takes Xanax for anxiety.    I reiterated the importance of effective treatment of obstructive sleep apnea with PAP machine.  Cardiovascular health risks of untreated sleep apnea were again reviewed.  Patient was asked to remain cautious if there is persistent hypersomnolence. The benefit of weight loss in reducing severity of obstructive sleep apnea was discussed.  Patient would benefit from adhering to a strict diet to achieve ideal BMI.     Change of PAP supplies regularly is important for effective use.   Change of cushion on the mask or plugs on nasal pillows along with disposable filters once every month and change of mask frame, tubing, headgear and Velcro straps every 6 months at the minimum was reiterated.    This patient is compliant with PAP machine and benefits from its use.  Apnea hypopneas index is corrected/improved.  Daytime hypersomnolence has resolved.     Patient will follow up in this clinic in 1 year APRN.    Thank you for allowing me to participate in your patient's care.    Nick Raymundo MD    Part of this note may be an electronic transcription/translation of spoken language to printed text using the Dragon Dictation System.

## 2020-04-06 RX ORDER — ATORVASTATIN CALCIUM 20 MG/1
TABLET, FILM COATED ORAL
Qty: 90 TABLET | Refills: 3 | Status: SHIPPED | OUTPATIENT
Start: 2020-04-06 | End: 2021-04-01 | Stop reason: SDUPTHER

## 2020-05-19 ENCOUNTER — OFFICE VISIT (OUTPATIENT)
Dept: FAMILY MEDICINE CLINIC | Facility: CLINIC | Age: 80
End: 2020-05-19

## 2020-05-19 VITALS
WEIGHT: 237 LBS | TEMPERATURE: 97.6 F | DIASTOLIC BLOOD PRESSURE: 72 MMHG | HEIGHT: 73 IN | HEART RATE: 74 BPM | BODY MASS INDEX: 31.41 KG/M2 | SYSTOLIC BLOOD PRESSURE: 120 MMHG | RESPIRATION RATE: 18 BRPM | OXYGEN SATURATION: 98 %

## 2020-05-19 DIAGNOSIS — I10 HYPERTENSION, ESSENTIAL: ICD-10-CM

## 2020-05-19 DIAGNOSIS — E78.5 HYPERLIPIDEMIA, UNSPECIFIED HYPERLIPIDEMIA TYPE: ICD-10-CM

## 2020-05-19 DIAGNOSIS — R41.3 MEMORY CHANGE: Primary | ICD-10-CM

## 2020-05-19 LAB
ALBUMIN SERPL-MCNC: 4.5 G/DL (ref 3.5–5.2)
ALBUMIN/GLOB SERPL: 1.7 G/DL
ALP SERPL-CCNC: 86 U/L (ref 39–117)
ALT SERPL W P-5'-P-CCNC: 35 U/L (ref 1–41)
ANION GAP SERPL CALCULATED.3IONS-SCNC: 11.4 MMOL/L (ref 5–15)
AST SERPL-CCNC: 31 U/L (ref 1–40)
BILIRUB SERPL-MCNC: 0.7 MG/DL (ref 0.2–1.2)
BUN BLD-MCNC: 10 MG/DL (ref 8–23)
BUN/CREAT SERPL: 8.7 (ref 7–25)
CALCIUM SPEC-SCNC: 9.4 MG/DL (ref 8.6–10.5)
CHLORIDE SERPL-SCNC: 104 MMOL/L (ref 98–107)
CHOLEST SERPL-MCNC: 110 MG/DL (ref 0–200)
CO2 SERPL-SCNC: 26.6 MMOL/L (ref 22–29)
CREAT BLD-MCNC: 1.15 MG/DL (ref 0.76–1.27)
GFR SERPL CREATININE-BSD FRML MDRD: 62 ML/MIN/1.73
GLOBULIN UR ELPH-MCNC: 2.6 GM/DL
GLUCOSE BLD-MCNC: 109 MG/DL (ref 65–99)
HDLC SERPL-MCNC: 33 MG/DL (ref 40–60)
LDLC SERPL CALC-MCNC: 43 MG/DL (ref 0–100)
LDLC/HDLC SERPL: 1.31 {RATIO}
POTASSIUM BLD-SCNC: 4.7 MMOL/L (ref 3.5–5.2)
PROT SERPL-MCNC: 7.1 G/DL (ref 6–8.5)
SODIUM BLD-SCNC: 142 MMOL/L (ref 136–145)
TRIGL SERPL-MCNC: 169 MG/DL (ref 0–150)
TSH SERPL DL<=0.05 MIU/L-ACNC: 3.24 UIU/ML (ref 0.27–4.2)
VIT B12 BLD-MCNC: 944 PG/ML (ref 211–946)
VLDLC SERPL-MCNC: 33.8 MG/DL (ref 5–40)

## 2020-05-19 PROCEDURE — 80061 LIPID PANEL: CPT | Performed by: INTERNAL MEDICINE

## 2020-05-19 PROCEDURE — 99214 OFFICE O/P EST MOD 30 MIN: CPT | Performed by: INTERNAL MEDICINE

## 2020-05-19 PROCEDURE — 36415 COLL VENOUS BLD VENIPUNCTURE: CPT | Performed by: INTERNAL MEDICINE

## 2020-05-19 PROCEDURE — 82607 VITAMIN B-12: CPT | Performed by: INTERNAL MEDICINE

## 2020-05-19 PROCEDURE — 80053 COMPREHEN METABOLIC PANEL: CPT | Performed by: INTERNAL MEDICINE

## 2020-05-19 PROCEDURE — 84443 ASSAY THYROID STIM HORMONE: CPT | Performed by: INTERNAL MEDICINE

## 2020-05-19 NOTE — PROGRESS NOTES
Subjective   Lucho Blank is a 79 y.o. male.  Here for follow-up on blood pressure and lipids also concern has been raised declining somewhat  Body mass index is 31.27 kg/m².  History of Present Illness   Patient here for follow-up on blood pressure is doing fairly well is compliant with lipid medicines we have do having wanted to see if this will get updated values on that.  Tolerating medicine cost does not seem to be an issue no other complaints at this point with exception of memory he is having problems recently could not remember his granddaughters name while she was in the room with him this concerned him a lot he is not getting lost driving or other things of more direct dramatic nature not losing his car keys also does not think any fan members noticing decline in his mental functioning.  We will get updated B12 and TSH if we do not find anything can either observe or send patient to neurology for formal evaluation overall is doing fairly well has been doing social distancing fairly well and is not gotten sick recently      's are Cipro causing nausea, penicillin causing hives shellfish derived products causing hives.  Non-smoker.  Family is positive heart disease lung disease hypertension diabetes cancer undefined stroke  Review of Systems   All other systems reviewed and are negative.      Objective   Vitals:    05/19/20 1006   BP: 120/72   Pulse: 74   Resp: 18   Temp: 97.6 °F (36.4 °C)   SpO2: 98%   Weight: 108 kg (237 lb)     Physical Exam   Constitutional: He appears well-developed and well-nourished.   HENT:   Head: Normocephalic and atraumatic.   Eyes: Pupils are equal, round, and reactive to light. Conjunctivae are normal.   Neck:   No carotid bruits   Cardiovascular: Normal rate, regular rhythm and normal heart sounds.   Pulmonary/Chest: Effort normal and breath sounds normal.   Abdominal: Soft. Bowel sounds are normal.   Neurological: He is alert.   Unremarkable gait and station   Skin: Skin is  warm and dry.   Nursing note and vitals reviewed.      No results found for: INR    Procedures    Assessment/Plan   1.  Memory change plan await pending lab further recommendations to follow    2.  Hypertension controlled continue present medicine    3.  Hyperlipidemia await pending lab both 2 and 3 if labs satisfactory will follow-up in 6 months time and as needed    Much of this encounter note is an electronic transcription/translation of spoken language to printed text.  The electronic translation of spoken language may permit erroneous, or at times, nonsensical words or phrases to be inadvertently transcribed.  Although I have reviewed the note for such errors, some may still exist. If there are questions or for further clarification, please contact me.

## 2020-05-22 DIAGNOSIS — R41.3 MEMORY IMPAIRMENT: Primary | ICD-10-CM

## 2020-05-23 RX ORDER — ZOLPIDEM TARTRATE 10 MG/1
10 TABLET ORAL NIGHTLY PRN
Qty: 30 TABLET | Refills: 0 | Status: SHIPPED | OUTPATIENT
Start: 2020-05-23 | End: 2020-05-27

## 2020-05-27 ENCOUNTER — TELEPHONE (OUTPATIENT)
Dept: FAMILY MEDICINE CLINIC | Facility: CLINIC | Age: 80
End: 2020-05-27

## 2020-05-27 RX ORDER — ZOLPIDEM TARTRATE 10 MG/1
10 TABLET ORAL NIGHTLY PRN
Qty: 90 TABLET | Refills: 0 | Status: SHIPPED | OUTPATIENT
Start: 2020-05-27 | End: 2020-10-08 | Stop reason: SDUPTHER

## 2020-05-27 NOTE — TELEPHONE ENCOUNTER
Done  
[facility]  
I did refill if you to check to make sure they did not get it and I will send it again was to [facility].  
Pt picked up rx from [facility], he needs rx sent to [facility] for 90 day supply  
Pt said he was seen in office on May 19.  He said he got off track on his zolpidem.  He said Dr. Quinones was going to call in a 30 day supply to [facility] on [address] and a 90 day supply to [facility].  Pt said [facility] did not have his prescription yet.  Pt is not clear if there was something else he is supposed to do or if it had been called in yet.  Pt said he will be out of medication in a few days.  Pt requesting call back to find out the status of the refill and what he may need to do.   
(0) independent

## 2020-06-01 RX ORDER — VALSARTAN 320 MG
TABLET ORAL
Qty: 90 TABLET | Refills: 3 | Status: SHIPPED | OUTPATIENT
Start: 2020-06-01 | End: 2021-04-23 | Stop reason: SDUPTHER

## 2020-07-07 RX ORDER — ALPRAZOLAM 2 MG/1
2 TABLET ORAL 3 TIMES DAILY PRN
Qty: 270 TABLET | Refills: 0 | Status: CANCELLED | OUTPATIENT
Start: 2020-07-07

## 2020-07-07 RX ORDER — ALPRAZOLAM 2 MG/1
2 TABLET ORAL 3 TIMES DAILY PRN
Qty: 270 TABLET | Refills: 0 | Status: SHIPPED | OUTPATIENT
Start: 2020-07-07 | End: 2020-11-16

## 2020-07-09 RX ORDER — CYCLOSPORINE 0.5 MG/ML
EMULSION OPHTHALMIC
Qty: 180 EACH | Refills: 3 | Status: SHIPPED | OUTPATIENT
Start: 2020-07-09 | End: 2022-06-24 | Stop reason: SDUPTHER

## 2020-07-09 RX ORDER — OMEPRAZOLE 40 MG/1
CAPSULE, DELAYED RELEASE ORAL
Qty: 90 CAPSULE | Refills: 3 | Status: SHIPPED | OUTPATIENT
Start: 2020-07-09 | End: 2021-06-23

## 2020-08-20 ENCOUNTER — TELEPHONE (OUTPATIENT)
Dept: FAMILY MEDICINE CLINIC | Facility: CLINIC | Age: 80
End: 2020-08-20

## 2020-08-20 NOTE — TELEPHONE ENCOUNTER
PATIENT CALLED IN AND STATED HE HAS SELF QUARANTINED FOR 3 MONTHS AND IS WORRYING IF THEIR IS ANYTHING ELSE HE NEEDS TO DO .  PATIENT WOULD LIKE TO KNOW  IF HE STARTS SEEING SYMPTOMS  WERE HE SHOULD GET TESTED . PATIENT HAS NO SYMPTOMS .       PLEASE CALL PATIENT [phone].

## 2020-08-20 NOTE — TELEPHONE ENCOUNTER
Give me context of this typically would have patient quarantine for 14 days I suspect there is more to the question than what I have in front of me

## 2020-08-20 NOTE — TELEPHONE ENCOUNTER
Pt has been quarantined and has no symptoms. Advised nothing else really to do, if becomes symptomatic go to urgent care

## 2020-09-03 RX ORDER — ICOSAPENT ETHYL 1000 MG/1
CAPSULE ORAL
Qty: 360 CAPSULE | Refills: 3 | Status: SHIPPED | OUTPATIENT
Start: 2020-09-03 | End: 2021-10-14 | Stop reason: SDUPTHER

## 2020-09-21 ENCOUNTER — OFFICE VISIT (OUTPATIENT)
Dept: NEUROLOGY | Facility: CLINIC | Age: 80
End: 2020-09-21

## 2020-09-21 VITALS
HEIGHT: 73 IN | HEART RATE: 76 BPM | SYSTOLIC BLOOD PRESSURE: 150 MMHG | BODY MASS INDEX: 31.28 KG/M2 | WEIGHT: 236 LBS | DIASTOLIC BLOOD PRESSURE: 88 MMHG | OXYGEN SATURATION: 96 %

## 2020-09-21 DIAGNOSIS — R41.3 MEMORY LOSS: Primary | ICD-10-CM

## 2020-09-21 PROCEDURE — 99204 OFFICE O/P NEW MOD 45 MIN: CPT | Performed by: PSYCHIATRY & NEUROLOGY
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
## 2020-10-08 ENCOUNTER — APPOINTMENT (OUTPATIENT)
Dept: MRI IMAGING | Facility: HOSPITAL | Age: 80
End: 2020-10-08

## 2020-10-08 RX ORDER — ZOLPIDEM TARTRATE 10 MG/1
10 TABLET ORAL NIGHTLY PRN
Qty: 90 TABLET | Refills: 0 | Status: SHIPPED | OUTPATIENT
Start: 2020-10-08 | End: 2021-01-07

## 2020-10-08 NOTE — TELEPHONE ENCOUNTER
Caller: Lucho Blank    Relationship: Self    Best call back number:[phone]   Medication needed:   Requested Prescriptions     Pending Prescriptions Disp Refills   • zolpidem (AMBIEN) 10 MG tablet 90 tablet 0     Sig: Take 1 tablet by mouth At Night As Needed for Sleep.       When do you need the refill by:ASAP     What details did the patient provide when requesting the medication:   90 DAYS REFILL    Does the patient have less than a 3 day supply:  [x] Yes  [] No    What is the patient's preferred pharmacy: [facility] - [address] [phone] [facility] [phone]

## 2020-10-15 ENCOUNTER — TELEPHONE (OUTPATIENT)
Dept: FAMILY MEDICINE CLINIC | Facility: CLINIC | Age: 80
End: 2020-10-15

## 2020-10-15 ENCOUNTER — NURSE TRIAGE (OUTPATIENT)
Dept: CALL CENTER | Facility: HOSPITAL | Age: 80
End: 2020-10-15

## 2020-10-15 NOTE — TELEPHONE ENCOUNTER
Call transferred from the HUB.  He is requesting covid testing and was calling for an order.  He was transferred from the HUB due to telling her that when he came in from walking the dog he had excessive sweating.  He states that he felt feverish this am and just generally not felling well.  He is concerned about having covid.  He denies cp or other symptoms except above symptoms.  He states that his PCP's office does not order covid testing unless there are more symptoms than he is having.  He states that it would ease his mind if he could be tested.  Discussed that there are testing sites across the [facility] but I did not know any specifically for his area.  Recommended calling his local health dept for information on testing sites that do not required an order.  Denies cp or any other symptoms.  Reason for Disposition  • [1] Fever AND [2] no signs of serious infection or localizing symptoms (all other triage questions negative)    Additional Information  • Negative: Shock suspected (e.g., cold/pale/clammy skin, too weak to stand, low BP, rapid pulse)  • Negative: Difficult to awaken or acting confused (e.g., disoriented, slurred speech)  • Negative: [1] Difficulty breathing AND [2] bluish lips, tongue or face  • Negative: New onset rash with multiple purple (or blood-colored) spots or dots  • Negative: Sounds like a life-threatening emergency to the triager  • Negative: Fever in a cancer patient who is currently (or recently) receiving chemotherapy or radiation therapy, or cancer patient who has metastatic or end-stage cancer and is receiving palliative care  • Negative: Pregnant  • Negative: Postpartum (from 0 to 6 weeks after delivery)  • Negative: Fever onset within 24 hours of receiving vaccine  • Negative: [1] Fever AND [2] within 14 days of COVID-19 Exposure  • Negative: Other symptom is present, see that guideline  (e.g., symptoms of cough, runny nose, sore throat, earache, abdominal pain, diarrhea,  "vomiting)  • Negative: [1] Headache AND [2] stiff neck (can't touch chin to chest)  • Negative: Difficulty breathing  • Negative: IV drug abuse  • Negative: [1] Drinking very little AND [2] dehydration suspected (e.g., no urine > 12 hours, very dry mouth, very lightheaded)  • Negative: Patient sounds very sick or weak to the triager  (Exception: mild weakness and hasn't taken fever medicine)  • Negative: Fever > 104 F (40 C)  • Negative: [1] Fever > 101 F (38.3 C) AND [2] age > 60  • Negative: [1] Fever > 100.0 F (37.8 C) AND [2] bedridden (e.g., nursing home patient, CVA, chronic illness, recovering from surgery)  • Negative: [1] Fever > 100.0 F (37.8 C) AND [2] indwelling urinary catheter (e.g., Black, Coude)  • Negative: [1] Fever > 100.0 F (37.8 C) AND [2] has port (portacath), central line, or PICC line  • Negative: [1] Fever > 100.0 F (37.8 C) AND [2] diabetes mellitus or weak immune system (e.g., HIV positive, cancer chemo, splenectomy, organ transplant, chronic steroids)  • Negative: [1] Fever > 100.0 F (37.8 C) AND [2] surgery in the last month  • Negative: Transplant patient (e.g., kidney, liver, lung, heart)  • Negative: Fever present > 3 days (72 hours)  • Negative: [1] Fever > 100.0 F (37.8 C) AND [2] foreign travel to a developing country in the last month  • Negative: [1] Intermittent fever > 100.0 F (37.8 C) AND [2] lasts > 3 weeks    Answer Assessment - Initial Assessment Questions  1. TEMPERATURE: \"What is the most recent temperature?\"  \"How was it measured?\"       Haven't checked it but feel like I was running one, when I came in from walking the dog I was sweating a lot.  2. ONSET: \"When did the fever start?\"       This am  3. SYMPTOMS: \"Do you have any other symptoms besides the fever?\"  (e.g., colds, headache, sore throat, earache, cough, rash, diarrhea, vomiting, abdominal pain)      Just don't fell well.  Like a \"wet dishrag\"  4. CAUSE: If there are no symptoms, ask: \"What do you think is " "causing the fever?\"       Not sure  5. CONTACTS: \"Does anyone else in the family have an infection?\"      no  6. TREATMENT: \"What have you done so far to treat this fever?\" (e.g., medications)      nothing  7. IMMUNOCOMPROMISE: \"Do you have of the following: diabetes, HIV positive, splenectomy, cancer chemotherapy, chronic steroid treatment, transplant patient, etc.\"      no  8. PREGNANCY: \"Is there any chance you are pregnant?\" \"When was your last menstrual period?\"      no  9. TRAVEL: \"Have you traveled out of the country in the last month?\" (e.g., travel history, exposures)      no    Protocols used: FEVER-ADULT-AH    "

## 2020-10-15 NOTE — TELEPHONE ENCOUNTER
I would encourage him to ask to be seen at urgent care and get the test done among other possibilities if he insists I will order Cobra test by self but I feel that patient be better served by getting a formal assessment done with COVID 19 testing included

## 2020-10-15 NOTE — TELEPHONE ENCOUNTER
"Caller: Lucho Blank    Relationship to patient: Self    Best call back number: [phone]     Concerns or Questions if Applicable: PATIENT WANTED TO GET TESTED FOR CO-VID 19 AND THE [facility] IS ASKING FOR AN ORDER.     Travel screen questions: PATIENT STATED HE FELT \"FOGGY\" TODAY AND HE FELT SLIGHTLY FEVERISH TODAY AND WAS EXCESSIVELY SWEATING AFTER WALKING THE DOG AND IT WAS NOT HOT OUTSIDE SO PATIENT WAS ALSO TRANSFERRED TO NURSE TRIAGE FOR FURTHER EVALUATION AND SHE STATED HE JUST NEEDS A CO-VID TEST DOES NOT FEEL HE NEEDS ANYTHING OTHER EVALUATION.     PATIENT MAY ALSO CALL AROUND TO CHECK FOR OTHER TESTING LOCATIONS PLEASE ADVISE           "

## 2020-10-27 ENCOUNTER — HOSPITAL ENCOUNTER (OUTPATIENT)
Dept: MRI IMAGING | Facility: HOSPITAL | Age: 80
Discharge: HOME | End: 2020-10-27
Admitting: PSYCHIATRY & NEUROLOGY

## 2020-10-27 DIAGNOSIS — R41.3 MEMORY LOSS: ICD-10-CM

## 2020-10-27 LAB — CREAT BLDA-MCNC: 1.4 MG/DL (ref 0.6–1.3)

## 2020-10-27 PROCEDURE — 82565 ASSAY OF CREATININE: CPT

## 2020-10-27 PROCEDURE — A9577 INJ MULTIHANCE: HCPCS | Performed by: PSYCHIATRY & NEUROLOGY

## 2020-10-27 PROCEDURE — 0 GADOBENATE DIMEGLUMINE 529 MG/ML SOLUTION: Performed by: PSYCHIATRY & NEUROLOGY

## 2020-10-27 PROCEDURE — 70553 MRI BRAIN STEM W/O & W/DYE: CPT

## 2020-10-27 RX ADMIN — GADOBENATE DIMEGLUMINE 20 ML: 529 INJECTION, SOLUTION INTRAVENOUS at 16:01

## 2020-11-16 RX ORDER — ALPRAZOLAM 2 MG/1
TABLET ORAL
Qty: 270 TABLET | Refills: 0 | Status: SHIPPED | OUTPATIENT
Start: 2020-11-16 | End: 2023-01-05

## 2020-11-17 ENCOUNTER — OFFICE VISIT (OUTPATIENT)
Dept: FAMILY MEDICINE CLINIC | Facility: CLINIC | Age: 80
End: 2020-11-17

## 2020-11-17 VITALS
DIASTOLIC BLOOD PRESSURE: 60 MMHG | OXYGEN SATURATION: 97 % | HEART RATE: 73 BPM | HEIGHT: 73 IN | WEIGHT: 240 LBS | SYSTOLIC BLOOD PRESSURE: 102 MMHG | TEMPERATURE: 97.3 F | BODY MASS INDEX: 31.81 KG/M2

## 2020-11-17 DIAGNOSIS — F32.A DEPRESSION, UNSPECIFIED DEPRESSION TYPE: ICD-10-CM

## 2020-11-17 DIAGNOSIS — Z00.00 MEDICARE ANNUAL WELLNESS VISIT, SUBSEQUENT: Primary | ICD-10-CM

## 2020-11-17 DIAGNOSIS — F41.9 ANXIETY: ICD-10-CM

## 2020-11-17 DIAGNOSIS — E78.5 HYPERLIPIDEMIA, UNSPECIFIED HYPERLIPIDEMIA TYPE: ICD-10-CM

## 2020-11-17 DIAGNOSIS — I10 HYPERTENSION, ESSENTIAL: ICD-10-CM

## 2020-11-17 DIAGNOSIS — Z51.81 MEDICATION MONITORING ENCOUNTER: ICD-10-CM

## 2020-11-17 DIAGNOSIS — G47.00 INSOMNIA, UNSPECIFIED TYPE: ICD-10-CM

## 2020-11-17 LAB
ALBUMIN SERPL-MCNC: 4.2 G/DL (ref 3.5–5.2)
ALBUMIN/GLOB SERPL: 1.6 G/DL
ALP SERPL-CCNC: 94 U/L (ref 39–117)
ALT SERPL W P-5'-P-CCNC: 35 U/L (ref 1–41)
ANION GAP SERPL CALCULATED.3IONS-SCNC: 7 MMOL/L (ref 5–15)
AST SERPL-CCNC: 29 U/L (ref 1–40)
BILIRUB SERPL-MCNC: 0.6 MG/DL (ref 0–1.2)
BUN SERPL-MCNC: 12 MG/DL (ref 8–23)
BUN/CREAT SERPL: 11.3 (ref 7–25)
CALCIUM SPEC-SCNC: 9.1 MG/DL (ref 8.6–10.5)
CHLORIDE SERPL-SCNC: 108 MMOL/L (ref 98–107)
CHOLEST SERPL-MCNC: 130 MG/DL (ref 0–200)
CO2 SERPL-SCNC: 27 MMOL/L (ref 22–29)
CREAT SERPL-MCNC: 1.06 MG/DL (ref 0.76–1.27)
ERYTHROCYTE [DISTWIDTH] IN BLOOD BY AUTOMATED COUNT: 12.5 % (ref 12.3–15.4)
GFR SERPL CREATININE-BSD FRML MDRD: 68 ML/MIN/1.73
GLOBULIN UR ELPH-MCNC: 2.7 GM/DL
GLUCOSE SERPL-MCNC: 101 MG/DL (ref 65–99)
HCT VFR BLD AUTO: 42.9 % (ref 37.5–51)
HDLC SERPL-MCNC: 39 MG/DL (ref 40–60)
HGB BLD-MCNC: 14.5 G/DL (ref 13–17.7)
LDLC SERPL CALC-MCNC: 72 MG/DL (ref 0–100)
LDLC/HDLC SERPL: 1.8 {RATIO}
LYMPHOCYTES # BLD AUTO: 2.6 10*3/MM3 (ref 0.7–3.1)
LYMPHOCYTES NFR BLD AUTO: 26.5 % (ref 19.6–45.3)
MCH RBC QN AUTO: 31.3 PG (ref 26.6–33)
MCHC RBC AUTO-ENTMCNC: 33.7 G/DL (ref 31.5–35.7)
MCV RBC AUTO: 92.9 FL (ref 79–97)
MONOCYTES # BLD AUTO: 0.3 10*3/MM3 (ref 0.1–0.9)
MONOCYTES NFR BLD AUTO: 3.1 % (ref 5–12)
NEUTROPHILS NFR BLD AUTO: 7 10*3/MM3 (ref 1.7–7)
NEUTROPHILS NFR BLD AUTO: 70.4 % (ref 42.7–76)
PLATELET # BLD AUTO: 237 10*3/MM3 (ref 140–450)
PMV BLD AUTO: 7.8 FL (ref 6–12)
POTASSIUM SERPL-SCNC: 4.4 MMOL/L (ref 3.5–5.2)
PROT SERPL-MCNC: 6.9 G/DL (ref 6–8.5)
RBC # BLD AUTO: 4.62 10*6/MM3 (ref 4.14–5.8)
SODIUM SERPL-SCNC: 142 MMOL/L (ref 136–145)
TRIGL SERPL-MCNC: 104 MG/DL (ref 0–150)
VLDLC SERPL-MCNC: 19 MG/DL (ref 5–40)
WBC # BLD AUTO: 9.9 10*3/MM3 (ref 3.4–10.8)

## 2020-11-17 PROCEDURE — 80053 COMPREHEN METABOLIC PANEL: CPT | Performed by: INTERNAL MEDICINE

## 2020-11-17 PROCEDURE — 80061 LIPID PANEL: CPT | Performed by: INTERNAL MEDICINE

## 2020-11-17 PROCEDURE — 85025 COMPLETE CBC W/AUTO DIFF WBC: CPT | Performed by: INTERNAL MEDICINE

## 2020-11-17 PROCEDURE — G0439 PPPS, SUBSEQ VISIT: HCPCS | Performed by: INTERNAL MEDICINE

## 2020-11-17 PROCEDURE — 99214 OFFICE O/P EST MOD 30 MIN: CPT | Performed by: INTERNAL MEDICINE

## 2020-11-17 PROCEDURE — 36415 COLL VENOUS BLD VENIPUNCTURE: CPT | Performed by: INTERNAL MEDICINE

## 2020-11-17 NOTE — PROGRESS NOTES
The ABCs of the Annual Wellness Visit  Subsequent Medicare Wellness Visit    Chief Complaint   Patient presents with   • Annual Exam     6 month check up/ toe fungus   • Memory Loss   • Hand issue     unable to use hand to write cursive anymore       Subjective   History of Present Illness:  Lucho Blank is a 80 y.o. male who presents for a Subsequent Medicare Wellness Visit.    HEALTH RISK ASSESSMENT    Recent Hospitalizations:  No hospitalization(s) within the last year.    Current Medical Providers:  Patient Care Team:  Ruel Quinones Jr., MD as PCP - General  Tashi Alonzo MD as Consulting Physician (Ophthalmology)  Camilla Billy MD as Consulting Physician (Dermatology)  Johnie Chan Jr., MD as Consulting Physician (Urology)  Ellie Beltran (Psychology)    Smoking Status:  Social History     Tobacco Use   Smoking Status Never Smoker   Smokeless Tobacco Never Used       Alcohol Consumption:  Social History     Substance and Sexual Activity   Alcohol Use Yes    Comment: occasional       Depression Screen:   PHQ-2/PHQ-9 Depression Screening 11/17/2020   Little interest or pleasure in doing things 2   Feeling down, depressed, or hopeless 1   Trouble falling or staying asleep, or sleeping too much 0   Feeling tired or having little energy 0   Poor appetite or overeating 0   Feeling bad about yourself - or that you are a failure or have let yourself or your family down 2   Trouble concentrating on things, such as reading the newspaper or watching television 0   Moving or speaking so slowly that other people could have noticed. Or the opposite - being so fidgety or restless that you have been moving around a lot more than usual 0   Thoughts that you would be better off dead, or of hurting yourself in some way 0   Total Score 5   If you checked off any problems, how difficult have these problems made it for you to do your work, take care of things at home, or get along with other people? Not difficult at  all       Fall Risk Screen:  ANASTASIA Fall Risk Assessment was completed, and patient is at LOW risk for falls.Assessment completed on:5/19/2020    Health Habits and Functional and Cognitive Screening:  Functional & Cognitive Status 11/17/2020   Do you have difficulty preparing food and eating? No   Do you have difficulty bathing yourself, getting dressed or grooming yourself? No   Do you have difficulty using the toilet? No   Do you have difficulty moving around from place to place? No   Do you have trouble with steps or getting out of a bed or a chair? No   Current Diet Well Balanced Diet   Dental Exam Up to date   Eye Exam Up to date   Exercise (times per week) 7 times per week   Current Exercises Include Walking   Current Exercise Activities Include -   Do you need help using the phone?  No   Are you deaf or do you have serious difficulty hearing?  No   Do you need help with transportation? No   Do you need help shopping? No   Do you need help preparing meals?  No   Do you need help with housework?  No   Do you need help with laundry? No   Do you need help taking your medications? No   Do you need help managing money? No   Do you ever drive or ride in a car without wearing a seat belt? No   Have you felt unusual stress, anger or loneliness in the last month? No   Who do you live with? Alone   If you need help, do you have trouble finding someone available to you? No   Have you been bothered in the last four weeks by sexual problems? No   Do you have difficulty concentrating, remembering or making decisions? Yes         Does the patient have evidence of cognitive impairment? No    Asprin use counseling:Taking ASA appropriately as indicated    Age-appropriate Screening Schedule:  Refer to the list below for future screening recommendations based on patient's age, sex and/or medical conditions. Orders for these recommended tests are listed in the plan section. The patient has been provided with a written  plan.    Health Maintenance   Topic Date Due   • COLONOSCOPY  1940   • ZOSTER VACCINE (2 of 3) 02/26/2009   • INFLUENZA VACCINE  08/01/2020   • LIPID PANEL  05/19/2021   • TDAP/TD VACCINES (2 - Td) 02/04/2029          The following portions of the patient's history were reviewed and updated as appropriate: past family history, past medical history, past social history, past surgical history and problem list.    Outpatient Medications Prior to Visit   Medication Sig Dispense Refill   • aspirin 81 MG tablet Take 1 tablet by mouth daily.     • atorvastatin (LIPITOR) 20 MG tablet TAKE 1 TABLET DAILY 90 tablet 3   • Coenzyme Q10 200 MG tablet Take 1 tablet by mouth daily.     • DIOVAN 320 MG tablet TAKE 1 TABLET DAILY 90 tablet 3   • EPINEPHrine (EPIPEN 2-TANNER) 0.3 MG/0.3ML solution auto-injector injection USE AS DIRECTED 1 each 3   • escitalopram (LEXAPRO) 10 MG tablet TAKE 1 TABLET DAILY 90 tablet 4   • loratadine (CLARITIN) 10 MG tablet TAKE 1 TABLET DAILY 90 tablet 4   • Multiple Vitamin (MULTIVITAMIN) capsule Take 1 capsule by mouth daily.     • NIASPAN 750 MG CR tablet TAKE 2 TABLETS DAILY 180 tablet 3   • omeprazole (priLOSEC) 40 MG capsule TAKE 1 CAPSULE DAILY 90 capsule 3   • RESTASIS 0.05 % ophthalmic emulsion INSTILL 1 DROP IN BOTH EYES EVERY 12 HOURS 180 each 3   • VASCEPA 1 g capsule capsule TAKE 2 CAPSULES ( 2 GM) TWICE A DAY WITH MEALS 360 capsule 3   • Xanax 2 MG tablet TAKE 1 TABLET THREE TIMES A DAY AS NEEDED FOR ANXIETY 270 tablet 0   • zolpidem (AMBIEN) 10 MG tablet Take 1 tablet by mouth At Night As Needed for Sleep. 90 tablet 0     No facility-administered medications prior to visit.        There is no problem list on file for this patient.      Advanced Care Planning:  ACP discussion was held with the patient during this visit. Patient has an advance directive in EMR which is still valid.     Review of Systems    Compared to one year ago, the patient feels his physical health is the  "same.  Compared to one year ago, the patient feels his mental health is the same.    Reviewed chart for potential of high risk medication in the elderly: yes  Reviewed chart for potential of harmful drug interactions in the elderly:yes    Objective         Vitals:    11/17/20 1101   BP: 102/60   BP Location: Left arm   Patient Position: Sitting   Cuff Size: Large Adult   Pulse: 73   Temp: 97.3 °F (36.3 °C)   TempSrc: Infrared   SpO2: 97%   Weight: 109 kg (240 lb)   Height: 185.4 cm (72.99\")   PainSc: 0-No pain       Body mass index is 31.67 kg/m².  Discussed the patient's BMI with him. The BMI is above average; BMI management plan is completed.    Physical Exam          Assessment/Plan   Medicare Risks and Personalized Health Plan  CMS Preventative Services Quick Reference  Advance Directive Discussion  Dementia/Memory   Depression/Dysphoria  Fall Risk  Hearing Problem  Obesity/Overweight     The above risks/problems have been discussed with the patient.  Pertinent information has been shared with the patient in the After Visit Summary.  Follow up plans and orders are seen below in the Assessment/Plan Section.    Diagnoses and all orders for this visit:    1. Hypertension, essential (Primary)  -     Comprehensive Metabolic Panel  -     CBC & Differential    2. Hyperlipidemia, unspecified hyperlipidemia type  -     Comprehensive Metabolic Panel  -     Lipid Panel    3. Medication monitoring encounter  -     Compliance Drug Analysis, Ur - Urine, Clean Catch    4. Insomnia, unspecified type  -     Compliance Drug Analysis, Ur - Urine, Clean Catch    5. Anxiety  -     Compliance Drug Analysis, Ur - Urine, Clean Catch    6. Depression, unspecified depression type      Follow Up:  No follow-ups on file.     An After Visit Summary and PPPS were given to the patient.             "

## 2020-11-17 NOTE — PROGRESS NOTES
Subjective   Lucho Blank is a 80 y.o. male.  Medicare wellness visit subsequent also follow-up blood pressure and lipids does have concerns about his memory as well.  Body mass index is 31.67 kg/m².  History of Present Illness   Medicare wellness visit subsequent following up on hyperlipidemia blood pressure needs very good grade contracts for his insomnia and anxiety is a little depression enlarged heart relating to his wife's death he is currently on medication for that regarding the memory he has had MRI at this point time is seen neurologist we do have a somewhat recent B12 and TSH on file he is scheduled to follow-up with neurology in early December he does have neuropsychiatric testing have been obtained recently which I do not have at this point overall is doing fair await pending lab work  Drainage continue Cipro causing nausea penicillin hives shellfish derived products hives.  Non-smoker.  Family's cousin for heart disease lung disease hypertension diabetes cancer and stroke as described difficulty with writing cursive handwriting is significantly worse manual dexterity is force-feeding himself picking up things and not dropping things seems the same I will let him continue to follow-up with neurology with that without further fresh ideas on my part.  Review of Systems   All other systems reviewed and are negative.      Objective   Vitals:    11/17/20 1101   Temp: 97.3 °F (36.3 °C)   Weight: 109 kg (240 lb)     Physical Exam  Vitals signs and nursing note reviewed.   Constitutional:       Appearance: Normal appearance.   HENT:      Head: Normocephalic and atraumatic.   Eyes:      Conjunctiva/sclera: Conjunctivae normal.      Pupils: Pupils are equal, round, and reactive to light.   Neck:      Comments: No carotid bruits  Cardiovascular:      Rate and Rhythm: Normal rate and regular rhythm.      Heart sounds: Normal heart sounds.   Pulmonary:      Breath sounds: Normal breath sounds.   Abdominal:       General: Abdomen is flat. Bowel sounds are normal.   Skin:     General: Skin is warm and dry.   Neurological:      General: No focal deficit present.      Mental Status: He is alert.      Comments: Unremarkable gait and station         No results found for: INR    Procedures    Assessment/Plan   1.  Medicare wellness visit subsequent    2.  Hyperlipidemia weight pending lab if good recheck 6 months.  3.  Hypertension controlled continue present meds follow-up 6 months    4.  Anxiety    5.  Depression on Lexapro for those continue the same    6.  Insomnia plan continue present medicine    7.  Medication monitoring plan await pinnae lab work chronic heart completed for the next years time on both the Ambien and Xanax.    Much of this encounter note is an electronic transcription/translation of spoken language to printed text.  The electronic translation of spoken language may permit erroneous, or at times, nonsensical words or phrases to be inadvertently transcribed.  Although I have reviewed the note for such errors, some may still exist. If there are questions or for further clarification, please contact me.  There are no diagnoses linked to this encounter.

## 2020-11-20 LAB — DRUGS UR: NORMAL

## 2020-11-30 RX ORDER — LORATADINE 10 MG/1
TABLET ORAL
Qty: 90 TABLET | Refills: 3 | Status: SHIPPED | OUTPATIENT
Start: 2020-11-30

## 2020-12-15 ENCOUNTER — TELEPHONE (OUTPATIENT)
Dept: FAMILY MEDICINE CLINIC | Facility: CLINIC | Age: 80
End: 2020-12-15

## 2020-12-15 NOTE — TELEPHONE ENCOUNTER
FOR A WEEK PT HAS BEEN EXPERIMENCING LOWER BACK DISCOMFORT, IT IS PROGRESSIVELY GETTING WORSE. HE IS REQUESTING A  CALL BACK TO DISCUSS THIS.

## 2020-12-15 NOTE — TELEPHONE ENCOUNTER
Ibuprofen should be okay no more than 60 mg every 6 hours or 800 mg every 8 hours do a relative rest do not dare try stretching this out.  Th if he does not respond, then make appointment

## 2020-12-15 NOTE — TELEPHONE ENCOUNTER
PATIENT CALLED BACK IN REGARDS TO HIS LOWER BACK PAIN.  TRIED TO GET HIM AN APPOINTMENT VIA MY CHART. UNABLE TO GET ONE SOON.   HE IS NOT COMFORTABLE DRIVING AT THIS TIME WITH HIS BACK PAIN.    HE BELIEVES HE MAY OVER EXERTED HIMSELF CUTTING CAR MATS LEANING OVER DOWN ON ONE KNEE .FEELS LIKE IT IS CENTRALIZED IN THE MUSCLES.     HE WANTS TO KNOW IF IBUPROFEN WOULD BE OK TO TAKE NOW. IF IT SEEMS TO GET WORSE HE WILL MAKE AN APPOINTMENT TO COME HIM.    CALL BACK NUMBER [phone]

## 2020-12-16 ENCOUNTER — TELEPHONE (OUTPATIENT)
Dept: FAMILY MEDICINE CLINIC | Facility: CLINIC | Age: 80
End: 2020-12-16

## 2020-12-16 NOTE — TELEPHONE ENCOUNTER
Advised patient just to relative rest for the next week or so if this resolves great if it keeps flaring with minor type activities that he would need to be seen in for investigation made hopefully it is resolved as just a sore muscle type phenomena.

## 2020-12-16 NOTE — TELEPHONE ENCOUNTER
PATIENT CALLED TO INFORM DR. HORVATH THAT HE FEELS BETTER TODAY WITH THE BACK PAIN, AND JUST WANTED TO CHECK AND SEE WHAT THE DOCTOR WOULD LIKE HIM TO DO.    PLEASE ADVISE  [phone]

## 2020-12-17 ENCOUNTER — TELEPHONE (OUTPATIENT)
Dept: FAMILY MEDICINE CLINIC | Facility: CLINIC | Age: 80
End: 2020-12-17

## 2020-12-17 NOTE — TELEPHONE ENCOUNTER
Caller: Lucho Blank    Relationship to patient: Self    Best call back number:[phone]    Concerns or Questions if Applicable: Patient is calling to state the senior living facility where he is living is going to offer the Covid shot.  Patient is wanting to know if Dr. Quinones agrees that he should get the shot.    Please advise.

## 2020-12-17 NOTE — TELEPHONE ENCOUNTER
Presume this is the Pfizer vaccine since this is one currently available, I would encourage patient take that.  Data is limited.  My vague impression is the first shot goes smoothly the second shot you are more likely to have side effects such as low-grade myalgias etc. overall for most of us I do think the benefit far outweighs the risk.  Anticipate getting a shot for myself as well.  When  my opportunity comes

## 2020-12-22 ENCOUNTER — OFFICE VISIT (OUTPATIENT)
Dept: NEUROLOGY | Facility: CLINIC | Age: 80
End: 2020-12-22

## 2020-12-22 VITALS
SYSTOLIC BLOOD PRESSURE: 124 MMHG | OXYGEN SATURATION: 98 % | WEIGHT: 235 LBS | BODY MASS INDEX: 31.14 KG/M2 | HEIGHT: 73 IN | HEART RATE: 71 BPM | DIASTOLIC BLOOD PRESSURE: 66 MMHG

## 2020-12-22 DIAGNOSIS — G31.84 MILD COGNITIVE IMPAIRMENT: Primary | ICD-10-CM

## 2020-12-22 DIAGNOSIS — F33.0 MILD RECURRENT MAJOR DEPRESSION (HCC): ICD-10-CM

## 2020-12-22 PROCEDURE — 99213 OFFICE O/P EST LOW 20 MIN: CPT | Performed by: NURSE PRACTITIONER

## 2020-12-22 RX ORDER — ESCITALOPRAM OXALATE 20 MG/1
20 TABLET ORAL DAILY
Qty: 90 TABLET | Refills: 3 | Status: SHIPPED | OUTPATIENT
Start: 2020-12-22 | End: 2021-09-21 | Stop reason: SDUPTHER

## 2020-12-22 NOTE — PROGRESS NOTES
Subjective:     Patient ID: Lucho Blank is a 80 y.o. male presenting for follow pu for memory loss. The patient had his initial evaluation with Dr. Flornetino on 9/21/2020. He was accompanied by his daughter at that time but is alone today. He began noticing memory trouble able one year ago. The examples that he gave Dr. Florentino are the same examples that he gave me today... putting a leash on his dog and then going to sleep and forgetting to take the dog out, forgetting hsi granddaughters name. His wife passed away in 2018 suddenly and he has been depressed about that. He is on Lexapro 10 mg daily. He denies difficulty sleeping but is prone to falling asleep while watching TV. He has a history of JHON but says he is compliant with his CPAP.     Dr. Florentino ordered an MRI of his brain. I reviewed those images today. Dr. Florentino has reviewed them as well. There was felt to be some greater anterior temporal atrophy and widening of the sylvian fissures, more so on the left. Dr. Florentino did not feel there was additional frontal lobe atrophy greater than expected for his age.     RPR and sed rate was ordered at his visit but the patient did not have these drawn.    He was also referred for neuropsychology testing. The testing revealed major depressive disorder and some mild deficits with immediate and delayed memory.     The patient agrees that he feels depressed. He does feel that the Lexapro helps but agrees he continues to feel fairly depressed on a daily basis. He actually thinks his memory has improved some since his last visit.     History of Present Illness  The following portions of the patient's history were reviewed and updated as appropriate: allergies, current medications, past family history, past medical history, past social history, past surgical history and problem list.    Review of Systems   Psychiatric/Behavioral: Positive for confusion and decreased concentration. The patient is nervous/anxious.          Objective:    Neurologic Exam  Mental Status:  Alert and oriented x3. Scored 28/30 on the MMSE at his last visit. Not repeated today. Neuropsychology results reviewed. Speech is fluent. Comprehension is intact.   Cranial Nerves II-XII: Pupils equal, round, reactive to light. Extraocular movements are full and conjugate in all directions. Pursuit movements do not provoke any apparent dizziness or discomfort.  No nystagmus noted.  Hearing is intact to voice. Facial strength and sensation are preserved and symmetric. Tongue and palate midline. Voice non-hoarse, non-dysarthric.   Motor: Normal bulk and tone of bilateral upper and lower extremities. Strength is 5/5 in all 4 extremities both proximally and distally. There are no abnormal or involuntary movements noted.  Sensation: Intact to light touch throughout. Romberg was negative with no significant sway.   Coordination: Fully intact. Finger-to-nose performed accurately bilaterally.  Reflexes: The deep tendon reflexes are 2+/4 in bilateral biceps, brachioradialis, triceps, patella, and Achilles.  No pathologic reflexes were noted.  Gait: Normal. No ataxia or apraxia.     Physical Exam   General: Well nourished, well developed, and in no acute distress.  HEENT: Normocephalic/atraumatic. Mucous membranes moist. Sclerae anicteric.   Heart: Regular rate and rhythm. No murmurs, rubs or gallops.  Lungs: Clear to auscultation bilaterally.  Skin: No notable rashes or lesions on the visible surfaces.   Extremities: No clubbing, cyanosis or significant edema.   Psychiatric: Pleasant, cooperative, and appropriate.     Assessment/Plan:     Diagnoses and all orders for this visit:    1. Mild cognitive impairment (Primary)    2. Mild recurrent major depression (CMS/HCC)    Other orders  -     escitalopram (LEXAPRO) 20 MG tablet; Take 1 tablet by mouth Daily.  Dispense: 90 tablet; Refill: 3        I reviewed the neuropsychology test results with the patient today. While he does  appear to have some mild memory loss, his depression seems to be what bothers him the most at this time. He actually feels his memory loss has improved some since his initial visit in in September. I am going to increase his Lexapro to 20 mg a day. It may also be helpful for him to see a therapist. If the memory loss worsens or if this does not improve his depressive symptoms we can discuss alternatives at his next visit. I will hold off on treating the memory loss with donepezil or Namenda at this time. We can discuss this in the future if needed.     He will f/u in 3 months sooner if needed.

## 2020-12-23 ENCOUNTER — TELEPHONE (OUTPATIENT)
Dept: FAMILY MEDICINE CLINIC | Facility: CLINIC | Age: 80
End: 2020-12-23

## 2020-12-23 NOTE — TELEPHONE ENCOUNTER
PATIENT STATES HE WOULD LIKE A CALL BACK FROM [facility]. HE IS TRYING TO REMEMBER THE NAME OF THE ORTHOPEDIC DR HE HAS SEEN IN THE PAST AND WOULD APPRECIATE A CALL BACK. HE IS HAVING SOME KNEE PROBLEMS AND WOULD LIKE TO SEE HIM AGAIN.     PLEASE ADVISE  [phone]

## 2021-01-07 ENCOUNTER — TELEPHONE (OUTPATIENT)
Dept: FAMILY MEDICINE CLINIC | Facility: CLINIC | Age: 81
End: 2021-01-07

## 2021-01-07 RX ORDER — ZOLPIDEM TARTRATE 10 MG/1
TABLET ORAL
Qty: 90 TABLET | Refills: 0 | Status: SHIPPED | OUTPATIENT
Start: 2021-01-07 | End: 2021-04-23

## 2021-01-07 RX ORDER — ESCITALOPRAM OXALATE 10 MG/1
TABLET ORAL
Qty: 90 TABLET | Refills: 3 | OUTPATIENT
Start: 2021-01-07

## 2021-01-07 NOTE — TELEPHONE ENCOUNTER
THE PATIENT IS RETURNING A PHONE CALL. HE DOESN'T KNOW WHO IT WAS FROM. ATTEMPTED TO CALL THE OFFICE BUT THEY DON'T KNOW WHO CALLED HIM.    PLEASE ADVISE.    CALLBACK NUMBER: [phone]

## 2021-02-09 ENCOUNTER — TELEPHONE (OUTPATIENT)
Dept: NEUROLOGY | Facility: CLINIC | Age: 81
End: 2021-02-09

## 2021-02-09 NOTE — TELEPHONE ENCOUNTER
Provider: ANDREE CARDENAS  Caller: PT  Relationship to Patient: SELF    Phone Number: [phone]  Reason for Call: PT IS EXPERIENCING TINNITUS IN RIGHT EAR  When was the patient last seen: 12/22/21  When did it start: AFTER HE RECEIVED HIS COVID VACCINE ON 1/6/21. PT SAID HE HASN'T HAD ANY NOTICEABLE SIDE EFFECTS/DANGEROUS SYMPTOMS FROM INOCULATIONS    Where is it located: RIGHT EAR    Characteristics of symptom/severity: PT EXPERIENCING TINNITUS ON RIGHT SIDE OF EAR EVERY DAY . PT STATES IT IS NOT CONSTANT AND IT COMES AND GOES. HE STATES IT'S ANNOYING BUT NOT TERRIBLE. HE SEEMS TO NOTICE IT MORE WHEN HE'S RESTING AND AFTER A COUPLE OF HOURS, IT SEEMS TO HAVE GONE AWAY.     Timing- Is it constant or intermittent: INTERMITTENT    What makes it worse:  N/A  What makes it better: N/A    What therapies/medications have you tried: STILL TAKING PRESCRIBED MEDICATIONS BUT HASN'T TAKEN ANYTHING FOR TINNITUS. PT ALSO WANTS TO NOTE INCREASED DOSAGE IN MEMORY MEDICATION HAS HELPED HIS MEMORY SIGNIFICANTLY.

## 2021-02-11 NOTE — TELEPHONE ENCOUNTER
I'd continue to monitor the tinnitus but if it continues he should talk to his PCP about it to see if any further work up is needed.

## 2021-03-01 ENCOUNTER — TELEPHONE (OUTPATIENT)
Dept: FAMILY MEDICINE CLINIC | Facility: CLINIC | Age: 81
End: 2021-03-01

## 2021-03-01 NOTE — TELEPHONE ENCOUNTER
PATIENT SCHEDULED FOR A NEW PATIENT APPOINTMENT IN MAY. PATIENT HAS SOME DIFFICULTY WRITING AND NEEDS INFORMATION AS TO WHAT HE NEEDS TO HAVE FILLED OUT BEFORE HIS APPOINTMENT OR IF HE NEEDS TO BRING SOMEONE WITH HIM TO HAVE HELP. PLEASE ADVISE.     CALLBACK NUMBER - [phone]

## 2021-03-03 ENCOUNTER — OFFICE VISIT (OUTPATIENT)
Dept: SLEEP MEDICINE | Facility: HOSPITAL | Age: 81
End: 2021-03-03

## 2021-03-03 VITALS
OXYGEN SATURATION: 95 % | HEIGHT: 73 IN | HEART RATE: 75 BPM | WEIGHT: 217 LBS | BODY MASS INDEX: 28.76 KG/M2 | DIASTOLIC BLOOD PRESSURE: 59 MMHG | SYSTOLIC BLOOD PRESSURE: 96 MMHG

## 2021-03-03 DIAGNOSIS — G47.00 INSOMNIA, UNSPECIFIED TYPE: ICD-10-CM

## 2021-03-03 DIAGNOSIS — E66.9 OBESITY (BMI 30-39.9): ICD-10-CM

## 2021-03-03 DIAGNOSIS — G47.33 OBSTRUCTIVE SLEEP APNEA: Primary | ICD-10-CM

## 2021-03-03 PROCEDURE — G0463 HOSPITAL OUTPT CLINIC VISIT: HCPCS

## 2021-03-03 NOTE — PROGRESS NOTES
[facility]  Telephone: [phone] / Fax: [phone] [place]  Telephone: [phone] / Fax: [phone] Carmela Collazo    PCP: Ruel Quinones Jr., MD    Reason for visit: [facility] f/u    Lucho Blank is a 80 y.o.male  was last seen at [facility] sleep lab 1 year ago. He is doing well on the CPAP machine. Current pressures are set on auto 10-20cm. He is unaware of air leak. Sleep quality is better.  He uses So clean to disinfect the CPAP particles. He uses a FFM. It fits well. He replaces CPAP accessories in regular intervals. He uses the machine and benefits.    He is taking Zolpidem 10mg qhs. No SE. It is prescribed by PCP. He takes Xanax 1/2 tab in am if he is anxious. He is unaware of any SE either.       DME [facility].    SH- no tobacco, drinks 1-2 alc per week, 1 caffeine per day.    ROS- +anxiety, +depression, controlled on medications.      Current Medications:    Current Outpatient Medications:   •  aspirin 81 MG tablet, Take 1 tablet by mouth daily., Disp: , Rfl:   •  atorvastatin (LIPITOR) 20 MG tablet, TAKE 1 TABLET DAILY, Disp: 90 tablet, Rfl: 3  •  Coenzyme Q10 200 MG tablet, Take 1 tablet by mouth daily., Disp: , Rfl:   •  DIOVAN 320 MG tablet, TAKE 1 TABLET DAILY, Disp: 90 tablet, Rfl: 3  •  EPINEPHrine (EPIPEN 2-TANNER) 0.3 MG/0.3ML solution auto-injector injection, USE AS DIRECTED, Disp: 1 each, Rfl: 3  •  escitalopram (LEXAPRO) 20 MG tablet, Take 1 tablet by mouth Daily., Disp: 90 tablet, Rfl: 3  •  loratadine (CLARITIN) 10 MG tablet, TAKE 1 TABLET DAILY, Disp: 90 tablet, Rfl: 3  •  Multiple Vitamin (MULTIVITAMIN) capsule, Take 1 capsule by mouth daily., Disp: , Rfl:   •  NIASPAN 750 MG CR tablet, TAKE 2 TABLETS DAILY, Disp: 180 tablet, Rfl: 3  •  omeprazole (priLOSEC) 40 MG capsule, TAKE 1 CAPSULE DAILY, Disp: 90 capsule, Rfl: 3  •  RESTASIS 0.05 % ophthalmic emulsion, INSTILL 1 DROP IN BOTH EYES EVERY 12 HOURS, Disp: 180 each, Rfl: 3  •  VASCEPA 1 g capsule capsule, TAKE  "2 CAPSULES ( 2 GM) TWICE A DAY WITH MEALS, Disp: 360 capsule, Rfl: 3  •  Xanax 2 MG tablet, TAKE 1 TABLET THREE TIMES A DAY AS NEEDED FOR ANXIETY, Disp: 270 tablet, Rfl: 0  •  zolpidem (AMBIEN) 10 MG tablet, TAKE 1 TABLET AT NIGHT AS NEEDED FOR SLEEP, Disp: 90 tablet, Rfl: 0   also entered in Sleep Questionnaire    Patient  has a past medical history of Allergic, Anxiety, Anxiety disorder, Cervical pain, Chest pain, CPAP (continuous positive airway pressure) dependence, History of EKG (03/20/2014), Hyperlipidemia, Hypertension, Increased serum lipids, Insomnia, Left ear pain, Right knee pain, and Sleep disturbance.    I have reviewed the Past Medical History, Past Surgical History, Social History and Family History.    Vital Signs BP 96/59   Pulse 75   Ht 185.4 cm (72.99\")   Wt 98.4 kg (217 lb)   SpO2 95%   BMI 28.64 kg/m²  Body mass index is 28.64 kg/m².    General Alert and oriented. No acute distress noted   Pharynx/Throat Class   Mallampati airway, large tongue, no evidence of redundant lateral pharyngeal tissue. No oral lesions. No thrush. Moist mucous membranes.   Head Normocephalic. Symmetrical. Atraumatic.    Nose No septal deviation. No drainage   Chest Wall Normal shape. Symmetric expansion with respiration. No tenderness.   Neck Trachea midline, no thyromegaly or adenopathy    Lungs Clear to auscultation bilaterally. No wheezes. No rhonchi. No rales. Respirations regular, even and unlabored.   Heart Regular rhythm and normal rate. Normal S1 and S2. No murmur   Abdomen Soft, non-tender and non-distended. Normal bowel sounds. No masses.   Extremities Moves all extremities well. No edema   Psychiatric Normal mood and affect.     Testing:  · Download last 90 days use is 78% with avg nightly use of 7 hours and 36 minutes on auto CPAP 10-20cm with AHI 2.1, leak of 6.3 L/min.    Study:  · 6/10/18  Overnight split polysomnogram study.  Diagnostic from 10:27 PM to 12:59 AM.  Sleep efficiency 51.9% which is " poor.  Total sleep time 1.32 hours.  There was absence of slow-wave and REM sleep on the diagnostic study.  AHI index 19.7 with RDI 30.3.  Patient slept in supine position for the entire diagnostic portion.  REM stage sleep was not seen and therefore severity underestimated.  Oxygen saturation remained above 88%.  Arousal index 36 events an hour.  PLM index is not increased.  Patient had 36.66% time snoring.     Titration portion from 12:59 AM to 5:30 AM.  Sleep efficiency was again very poor at 56.9%.  Some improvement in slow-wave and REM sleep to 7% each.  AHI index was improved.  There are indicators for severe in rem stage sleep.  CPAP increased from 5-10 cm water pressure stopped maximum sleep noted at CPAP pressure 10 cm.  There was some stage REM sleep at 8 cm water pressure and AHI index is increased because of this.  Patient may require pressures slightly higher than 10 cm.        Impression:  1. Obstructive sleep apnea    2. Obesity (BMI 30-39.9)    3. Insomnia, unspecified type          Plan:  Patient uses the CPAP device and benefits from its use in terms of reduction of hypersomnia and snoring.  AHI appears to be within adequate range. I reviewed download report and original sleep study report with the patient.  He takes Ambien 10mg for insomnia and has not had any SE.     Weight loss will be strongly beneficial to reduce the severity of sleep-disordered breathing.  Caution during activities that require prolonged concentration is strongly advised if sleepiness returns. Changing of PAP supplies regularly is important for effective use.   Full face mask frame should be replaced every 3 months, full face cushion monthly and headgear every 6 months. I discussed supplies replacement schedule with patient.    Follow up with Dr. Raymundo in one year    Thank you for allowing me to participate in your patient's care.      THERESA Chacon  [facility]  Phone: [phone]      Part of this note  may be an electronic transcription/translation of spoken language to printed text using the Dragon Dictation System.

## 2021-03-15 ENCOUNTER — TELEPHONE (OUTPATIENT)
Dept: SLEEP MEDICINE | Facility: HOSPITAL | Age: 81
End: 2021-03-15

## 2021-03-15 NOTE — TELEPHONE ENCOUNTER
----- Message from CHANDAN Gallego sent at 3/15/2021 11:07 AM EDT -----  Ok. I just spoke with him and faxed orders to [facility]. He wanted to switch Dme companies as well.   ----- Message -----  From: Camilla Diaz APRN  Sent: 3/12/2021   4:45 PM EDT  To: CHANDAN Gallego dreamwear FFM or Air Fit F30 mask should be tried. Please also order him gecko pad to cover the nasal area to prevent irritation.  ----- Message -----  From: Keyla Sun RPSGT  Sent: 3/11/2021   1:32 PM EST  To: THERESA Chacon. This pt just called and he recently had surgery on his nose this past Monday. He was advised by his doctor to stay off cpap for 48hrs and he did that, however, he is now trying to continue but he can not use the cpap due to the FF mask he is using irritating his nose where it is trying to heal. I advised him to contact his DME for a different FF mask , something like Dreamwear FF that comes underneath his nose, but I wanted to see if you had any other advice for him?

## 2021-03-15 NOTE — TELEPHONE ENCOUNTER
Pt called wanting to switch DME companies to another company closer to his home  & took his insurance. Faxing orders to [facility]. Pt had recent nasal surgery and hasn't used his cpap in a few days, pt is needing a different mask that will not irritate his nose on top.

## 2021-03-23 ENCOUNTER — OFFICE VISIT (OUTPATIENT)
Dept: NEUROLOGY | Facility: CLINIC | Age: 81
End: 2021-03-23

## 2021-03-23 VITALS
SYSTOLIC BLOOD PRESSURE: 116 MMHG | OXYGEN SATURATION: 96 % | DIASTOLIC BLOOD PRESSURE: 64 MMHG | BODY MASS INDEX: 28.63 KG/M2 | HEIGHT: 73 IN | HEART RATE: 78 BPM | WEIGHT: 216 LBS

## 2021-03-23 DIAGNOSIS — R41.3 MEMORY LOSS: ICD-10-CM

## 2021-03-23 DIAGNOSIS — F33.1 MODERATE EPISODE OF RECURRENT MAJOR DEPRESSIVE DISORDER (HCC): Primary | ICD-10-CM

## 2021-03-23 PROCEDURE — 99213 OFFICE O/P EST LOW 20 MIN: CPT | Performed by: NURSE PRACTITIONER

## 2021-03-23 NOTE — PROGRESS NOTES
Subjective:     Patient ID: Lucho Blank is a 80 y.o. male for follow-up for memory loss and depression.  My last visit with the patient was on December 22, 2020.  Neuropsychology testing revealed major depressive disorder and severe mental delay presents with immediate and delayed memory.  He is not on any medications for memory loss but has started taking Lexapro.  At his last visit I increased the dose to 20 mg daily.  He feels this is helped quite a bit.  Since doing this he does feel like his memory has improved and his mood is much better.  He denies any blurred problems since his last visit.    History of Present Illness  The following portions of the patient's history were reviewed and updated as appropriate: allergies, current medications, past family history, past medical history, past social history, past surgical history and problem list.    Review of Systems   Constitutional: Negative for chills, fatigue and fever.   HENT: Positive for tinnitus (right). Negative for hearing loss and trouble swallowing.    Eyes: Negative for photophobia, pain and itching.   Respiratory: Positive for apnea. Negative for shortness of breath and wheezing.    Cardiovascular: Negative for chest pain, palpitations and leg swelling.   Gastrointestinal: Negative for diarrhea, nausea and vomiting.   Endocrine: Negative for cold intolerance, heat intolerance and polydipsia.   Genitourinary: Negative for decreased urine volume, difficulty urinating and urgency.   Musculoskeletal: Negative for back pain, neck pain and neck stiffness.   Skin: Negative for color change, rash and wound.   Allergic/Immunologic: Negative for environmental allergies, food allergies and immunocompromised state.   Neurological: Negative for dizziness, tremors, seizures, syncope, facial asymmetry, speech difficulty, weakness, light-headedness, numbness and headaches.   Hematological: Negative for adenopathy. Does not bruise/bleed easily.    Psychiatric/Behavioral: Negative for confusion, decreased concentration and sleep disturbance. The patient is not nervous/anxious.         Objective:    Neurologic Exam  Mental Status:  Alert and oriented x3. Scored 28/30 on the MMSE at his last visit. Not repeated today. Neuropsychology results reviewed. Speech is fluent. Comprehension is intact.   Cranial Nerves II-XII: Pupils equal, round, reactive to light. Extraocular movements are full and conjugate in all directions. Pursuit movements do not provoke any apparent dizziness or discomfort.  No nystagmus noted.  Hearing is intact to voice. Facial strength and sensation are preserved and symmetric. Tongue and palate midline. Voice non-hoarse, non-dysarthric.   Motor: Normal bulk and tone of bilateral upper and lower extremities. Strength is 5/5 in all 4 extremities both proximally and distally. There are no abnormal or involuntary movements noted.  Sensation: Intact to light touch throughout. Romberg was negative with no significant sway.   Coordination: Fully intact. Finger-to-nose performed accurately bilaterally.  Reflexes: The deep tendon reflexes are 2+/4 in bilateral biceps, brachioradialis, triceps, patella, and Achilles.  No pathologic reflexes were noted.  Gait: Normal. No ataxia or apraxia.     Physical Exam  General: Well nourished, well developed, and in no acute distress.  HEENT: Normocephalic/atraumatic. Mucous membranes moist. Sclerae anicteric.   Heart: Regular rate and rhythm. No murmurs, rubs or gallops.  Lungs: Clear to auscultation bilaterally.  Skin: No notable rashes or lesions on the visible surfaces.   Extremities: No clubbing, cyanosis or significant edema.   Psychiatric: Pleasant, cooperative, and appropriate.      Assessment/Plan:     Diagnoses and all orders for this visit:    1. Moderate episode of recurrent major depressive disorder (CMS/HCC) (Primary)    2. Memory loss        Continues to do well.  He thinks the increase of Lexapro  from 10 mg daily to 20 mg daily is working well for him.  He feels like his memory continues to improve since he is treating his depression.  He will remain on Lexapro 20 mg daily.  We will continue to monitor his memory.  Should he feel it is worsening we can discuss medication treatment if warranted. He is to call with any problems, otherwise will f/u in 6 months.

## 2021-04-01 RX ORDER — ATORVASTATIN CALCIUM 20 MG/1
20 TABLET, FILM COATED ORAL DAILY
Qty: 90 TABLET | Refills: 0 | Status: SHIPPED | OUTPATIENT
Start: 2021-04-01 | End: 2021-07-01

## 2021-04-23 ENCOUNTER — OFFICE VISIT (OUTPATIENT)
Dept: FAMILY MEDICINE CLINIC | Facility: CLINIC | Age: 81
End: 2021-04-23

## 2021-04-23 VITALS
WEIGHT: 214.4 LBS | HEART RATE: 71 BPM | SYSTOLIC BLOOD PRESSURE: 90 MMHG | OXYGEN SATURATION: 95 % | HEIGHT: 73 IN | TEMPERATURE: 95.3 F | BODY MASS INDEX: 28.41 KG/M2 | DIASTOLIC BLOOD PRESSURE: 54 MMHG

## 2021-04-23 DIAGNOSIS — Z12.5 SCREENING FOR PROSTATE CANCER: ICD-10-CM

## 2021-04-23 DIAGNOSIS — I10 ESSENTIAL HYPERTENSION: ICD-10-CM

## 2021-04-23 DIAGNOSIS — M62.838 MUSCLE SPASM: ICD-10-CM

## 2021-04-23 DIAGNOSIS — E78.2 MIXED HYPERLIPIDEMIA: Primary | ICD-10-CM

## 2021-04-23 PROCEDURE — 99214 OFFICE O/P EST MOD 30 MIN: CPT | Performed by: FAMILY MEDICINE

## 2021-04-23 RX ORDER — FAMOTIDINE 20 MG
1 TABLET ORAL DAILY
Qty: 90 CAPSULE | Refills: 0 | Status: SHIPPED | OUTPATIENT
Start: 2021-04-23

## 2021-04-23 RX ORDER — VALSARTAN 160 MG/1
160 TABLET ORAL DAILY
Qty: 90 TABLET | Refills: 0 | Status: SHIPPED | OUTPATIENT
Start: 2021-04-23 | End: 2021-07-07

## 2021-04-24 LAB
ALBUMIN SERPL-MCNC: 4.8 G/DL (ref 3.5–5.2)
ALBUMIN/GLOB SERPL: 2.4 G/DL
ALP SERPL-CCNC: 106 U/L (ref 39–117)
ALT SERPL-CCNC: 38 U/L (ref 1–41)
AST SERPL-CCNC: 35 U/L (ref 1–40)
BILIRUB SERPL-MCNC: 1.2 MG/DL (ref 0–1.2)
BUN SERPL-MCNC: 13 MG/DL (ref 8–23)
BUN/CREAT SERPL: 12.3 (ref 7–25)
CALCIUM SERPL-MCNC: 9.8 MG/DL (ref 8.6–10.5)
CHLORIDE SERPL-SCNC: 103 MMOL/L (ref 98–107)
CHOLEST SERPL-MCNC: 107 MG/DL (ref 0–200)
CO2 SERPL-SCNC: 28 MMOL/L (ref 22–29)
CREAT SERPL-MCNC: 1.06 MG/DL (ref 0.76–1.27)
GLOBULIN SER CALC-MCNC: 2 GM/DL
GLUCOSE SERPL-MCNC: 92 MG/DL (ref 65–99)
HDLC SERPL-MCNC: 40 MG/DL (ref 40–60)
LDLC SERPL CALC-MCNC: 50 MG/DL (ref 0–100)
POTASSIUM SERPL-SCNC: 4.5 MMOL/L (ref 3.5–5.2)
PROT SERPL-MCNC: 6.8 G/DL (ref 6–8.5)
PSA SERPL-MCNC: 6.62 NG/ML (ref 0–4)
SODIUM SERPL-SCNC: 141 MMOL/L (ref 136–145)
TRIGL SERPL-MCNC: 85 MG/DL (ref 0–150)
VLDLC SERPL CALC-MCNC: 17 MG/DL (ref 5–40)

## 2021-05-07 ENCOUNTER — OFFICE VISIT (OUTPATIENT)
Dept: FAMILY MEDICINE CLINIC | Facility: CLINIC | Age: 81
End: 2021-05-07

## 2021-05-07 VITALS
TEMPERATURE: 96.2 F | BODY MASS INDEX: 28.76 KG/M2 | DIASTOLIC BLOOD PRESSURE: 64 MMHG | SYSTOLIC BLOOD PRESSURE: 100 MMHG | HEIGHT: 73 IN | HEART RATE: 74 BPM | WEIGHT: 217 LBS | OXYGEN SATURATION: 95 %

## 2021-05-07 DIAGNOSIS — M62.838 MUSCLE SPASM: Primary | ICD-10-CM

## 2021-05-07 DIAGNOSIS — I10 HYPERTENSION, ESSENTIAL: ICD-10-CM

## 2021-05-07 DIAGNOSIS — R53.83 OTHER FATIGUE: ICD-10-CM

## 2021-05-07 PROCEDURE — 99213 OFFICE O/P EST LOW 20 MIN: CPT | Performed by: FAMILY MEDICINE

## 2021-06-23 RX ORDER — OMEPRAZOLE 40 MG/1
CAPSULE, DELAYED RELEASE ORAL
Qty: 90 CAPSULE | Refills: 3 | Status: SHIPPED | OUTPATIENT
Start: 2021-06-23 | End: 2022-09-19

## 2021-07-01 RX ORDER — ATORVASTATIN CALCIUM 20 MG/1
TABLET, FILM COATED ORAL
Qty: 90 TABLET | Refills: 3 | Status: SHIPPED | OUTPATIENT
Start: 2021-07-01 | End: 2022-06-27

## 2021-07-07 RX ORDER — VALSARTAN 160 MG/1
TABLET ORAL
Qty: 90 TABLET | Refills: 3 | Status: SHIPPED | OUTPATIENT
Start: 2021-07-07 | End: 2022-02-26

## 2021-07-10 ENCOUNTER — HOSPITAL ENCOUNTER (EMERGENCY)
Facility: HOSPITAL | Age: 81
Discharge: HOME | End: 2021-07-10
Attending: EMERGENCY MEDICINE | Admitting: EMERGENCY MEDICINE

## 2021-07-10 ENCOUNTER — APPOINTMENT (OUTPATIENT)
Dept: CT IMAGING | Facility: HOSPITAL | Age: 81
End: 2021-07-10

## 2021-07-10 ENCOUNTER — TELEPHONE (OUTPATIENT)
Dept: FAMILY MEDICINE CLINIC | Facility: CLINIC | Age: 81
End: 2021-07-10

## 2021-07-10 VITALS
HEIGHT: 73 IN | DIASTOLIC BLOOD PRESSURE: 82 MMHG | OXYGEN SATURATION: 99 % | SYSTOLIC BLOOD PRESSURE: 135 MMHG | BODY MASS INDEX: 27.17 KG/M2 | RESPIRATION RATE: 16 BRPM | HEART RATE: 61 BPM | WEIGHT: 205 LBS | TEMPERATURE: 97.4 F

## 2021-07-10 DIAGNOSIS — K52.9 COLITIS: Primary | ICD-10-CM

## 2021-07-10 LAB
ADV 40+41 DNA STL QL NAA+NON-PROBE: NOT DETECTED
ALBUMIN SERPL-MCNC: 3.6 G/DL (ref 3.5–5.2)
ALBUMIN/GLOB SERPL: 1.8 G/DL
ALP SERPL-CCNC: 81 U/L (ref 39–117)
ALT SERPL W P-5'-P-CCNC: 27 U/L (ref 1–41)
ANION GAP SERPL CALCULATED.3IONS-SCNC: 6.3 MMOL/L (ref 5–15)
AST SERPL-CCNC: 21 U/L (ref 1–40)
ASTRO TYP 1-8 RNA STL QL NAA+NON-PROBE: NOT DETECTED
BASOPHILS # BLD AUTO: 0.03 10*3/MM3 (ref 0–0.2)
BASOPHILS NFR BLD AUTO: 0.4 % (ref 0–1.5)
BILIRUB SERPL-MCNC: 0.5 MG/DL (ref 0–1.2)
BILIRUB UR QL STRIP: NEGATIVE
BUN SERPL-MCNC: 10 MG/DL (ref 8–23)
BUN/CREAT SERPL: 10.5 (ref 7–25)
C CAYETANENSIS DNA STL QL NAA+NON-PROBE: NOT DETECTED
C COLI+JEJ+UPSA DNA STL QL NAA+NON-PROBE: NOT DETECTED
C DIFF TOX GENS STL QL NAA+PROBE: NEGATIVE
CALCIUM SPEC-SCNC: 8.4 MG/DL (ref 8.6–10.5)
CHLORIDE SERPL-SCNC: 107 MMOL/L (ref 98–107)
CLARITY UR: CLEAR
CO2 SERPL-SCNC: 23.7 MMOL/L (ref 22–29)
COLOR UR: ABNORMAL
CREAT SERPL-MCNC: 0.95 MG/DL (ref 0.76–1.27)
CRYPTOSP DNA STL QL NAA+NON-PROBE: NOT DETECTED
DEPRECATED RDW RBC AUTO: 41.4 FL (ref 37–54)
E HISTOLYT DNA STL QL NAA+NON-PROBE: NOT DETECTED
EAEC PAA PLAS AGGR+AATA ST NAA+NON-PRB: NOT DETECTED
EC STX1+STX2 GENES STL QL NAA+NON-PROBE: NOT DETECTED
EOSINOPHIL # BLD AUTO: 0.09 10*3/MM3 (ref 0–0.4)
EOSINOPHIL NFR BLD AUTO: 1.2 % (ref 0.3–6.2)
EPEC EAE GENE STL QL NAA+NON-PROBE: NOT DETECTED
ERYTHROCYTE [DISTWIDTH] IN BLOOD BY AUTOMATED COUNT: 12.1 % (ref 12.3–15.4)
ETEC LTA+ST1A+ST1B TOX ST NAA+NON-PROBE: NOT DETECTED
G LAMBLIA DNA STL QL NAA+NON-PROBE: NOT DETECTED
GFR SERPL CREATININE-BSD FRML MDRD: 77 ML/MIN/1.73
GLOBULIN UR ELPH-MCNC: 2 GM/DL
GLUCOSE SERPL-MCNC: 88 MG/DL (ref 65–99)
GLUCOSE UR STRIP-MCNC: NEGATIVE MG/DL
HCT VFR BLD AUTO: 37 % (ref 37.5–51)
HGB BLD-MCNC: 12.7 G/DL (ref 13–17.7)
HGB UR QL STRIP.AUTO: NEGATIVE
IMM GRANULOCYTES # BLD AUTO: 0.02 10*3/MM3 (ref 0–0.05)
IMM GRANULOCYTES NFR BLD AUTO: 0.3 % (ref 0–0.5)
KETONES UR QL STRIP: ABNORMAL
LEUKOCYTE ESTERASE UR QL STRIP.AUTO: NEGATIVE
LIPASE SERPL-CCNC: 40 U/L (ref 13–60)
LYMPHOCYTES # BLD AUTO: 2.08 10*3/MM3 (ref 0.7–3.1)
LYMPHOCYTES NFR BLD AUTO: 27.1 % (ref 19.6–45.3)
MCH RBC QN AUTO: 31.8 PG (ref 26.6–33)
MCHC RBC AUTO-ENTMCNC: 34.3 G/DL (ref 31.5–35.7)
MCV RBC AUTO: 92.5 FL (ref 79–97)
MONOCYTES # BLD AUTO: 0.73 10*3/MM3 (ref 0.1–0.9)
MONOCYTES NFR BLD AUTO: 9.5 % (ref 5–12)
NEUTROPHILS NFR BLD AUTO: 4.72 10*3/MM3 (ref 1.7–7)
NEUTROPHILS NFR BLD AUTO: 61.5 % (ref 42.7–76)
NITRITE UR QL STRIP: NEGATIVE
NOROVIRUS GI+II RNA STL QL NAA+NON-PROBE: NOT DETECTED
NRBC BLD AUTO-RTO: 0 /100 WBC (ref 0–0.2)
P SHIGELLOIDES DNA STL QL NAA+NON-PROBE: NOT DETECTED
PH UR STRIP.AUTO: 6.5 [PH] (ref 5–8)
PLATELET # BLD AUTO: 223 10*3/MM3 (ref 140–450)
PMV BLD AUTO: 9.6 FL (ref 6–12)
POTASSIUM SERPL-SCNC: 4.1 MMOL/L (ref 3.5–5.2)
PROT SERPL-MCNC: 5.6 G/DL (ref 6–8.5)
PROT UR QL STRIP: NEGATIVE
RBC # BLD AUTO: 4 10*6/MM3 (ref 4.14–5.8)
RVA RNA STL QL NAA+NON-PROBE: NOT DETECTED
S ENT+BONG DNA STL QL NAA+NON-PROBE: NOT DETECTED
SAPO I+II+IV+V RNA STL QL NAA+NON-PROBE: NOT DETECTED
SHIGELLA SP+EIEC IPAH ST NAA+NON-PROBE: NOT DETECTED
SODIUM SERPL-SCNC: 137 MMOL/L (ref 136–145)
SP GR UR STRIP: 1.02 (ref 1–1.03)
UROBILINOGEN UR QL STRIP: ABNORMAL
V CHOL+PARA+VUL DNA STL QL NAA+NON-PROBE: NOT DETECTED
V CHOLERAE DNA STL QL NAA+NON-PROBE: NOT DETECTED
WBC # BLD AUTO: 7.67 10*3/MM3 (ref 3.4–10.8)
Y ENTEROCOL DNA STL QL NAA+NON-PROBE: NOT DETECTED

## 2021-07-10 PROCEDURE — 74177 CT ABD & PELVIS W/CONTRAST: CPT

## 2021-07-10 PROCEDURE — 25010000002 IOPAMIDOL 61 % SOLUTION: Performed by: EMERGENCY MEDICINE

## 2021-07-10 PROCEDURE — 87493 C DIFF AMPLIFIED PROBE: CPT | Performed by: PHYSICIAN ASSISTANT

## 2021-07-10 PROCEDURE — 99284 EMERGENCY DEPT VISIT MOD MDM: CPT

## 2021-07-10 PROCEDURE — 81003 URINALYSIS AUTO W/O SCOPE: CPT | Performed by: PHYSICIAN ASSISTANT

## 2021-07-10 PROCEDURE — 83690 ASSAY OF LIPASE: CPT | Performed by: PHYSICIAN ASSISTANT

## 2021-07-10 PROCEDURE — 85025 COMPLETE CBC W/AUTO DIFF WBC: CPT | Performed by: PHYSICIAN ASSISTANT

## 2021-07-10 PROCEDURE — 0097U HC BIOFIRE FILMARRAY GI PANEL: CPT | Performed by: PHYSICIAN ASSISTANT

## 2021-07-10 PROCEDURE — 80053 COMPREHEN METABOLIC PANEL: CPT | Performed by: PHYSICIAN ASSISTANT

## 2021-07-10 PROCEDURE — 36415 COLL VENOUS BLD VENIPUNCTURE: CPT

## 2021-07-10 RX ORDER — METRONIDAZOLE 500 MG/1
500 TABLET ORAL 2 TIMES DAILY
Qty: 14 TABLET | Refills: 0 | Status: SHIPPED | OUTPATIENT
Start: 2021-07-10 | End: 2021-07-26

## 2021-07-10 RX ORDER — SODIUM CHLORIDE 0.9 % (FLUSH) 0.9 %
10 SYRINGE (ML) INJECTION AS NEEDED
Status: DISCONTINUED | OUTPATIENT
Start: 2021-07-10 | End: 2021-07-10 | Stop reason: HOSPADM

## 2021-07-10 RX ORDER — ONDANSETRON 4 MG/1
4 TABLET, ORALLY DISINTEGRATING ORAL EVERY 8 HOURS PRN
Qty: 15 TABLET | Refills: 0 | Status: SHIPPED | OUTPATIENT
Start: 2021-07-10 | End: 2021-08-31

## 2021-07-10 RX ADMIN — IOPAMIDOL 95 ML: 612 INJECTION, SOLUTION INTRAVENOUS at 18:12

## 2021-07-10 NOTE — ED PROVIDER NOTES
EMERGENCY DEPARTMENT ENCOUNTER    Room Number:  07/07  Date of encounter:  7/10/2021  PCP: Arminda Raymundo MD  Historian: Patient      HPI:  Chief Complaint: Urinary frequency, diarrhea, left flank, and left lower quadrant pain  A complete HPI/ROS/PMH/PSH/SH/FH are unobtainable due to: Nothing    Context: Lucho Blank is a 80 y.o. male who presents to the ED c/o urinary frequency, diarrhea left flank and left lower quadrant pain intermittent and ongoing for the past 2 weeks.  Patient states at present the pain is minimal: Is at its worse is an 8 out of 10 on the pain scale and sharp.  He denies associated nausea, vomiting, fever, chills, chest pain.  The bouts of diarrhea are nonbloody but stated to be almost daily.      PAST MEDICAL HISTORY  Active Ambulatory Problems     Diagnosis Date Noted   • No Active Ambulatory Problems     Resolved Ambulatory Problems     Diagnosis Date Noted   • No Resolved Ambulatory Problems     Past Medical History:   Diagnosis Date   • Allergic    • Anxiety    • Anxiety disorder    • Cervical pain    • Chest pain    • CPAP (continuous positive airway pressure) dependence    • History of EKG 03/20/2014   • Hyperlipidemia    • Hypertension    • Increased serum lipids    • Insomnia    • Left ear pain    • Right knee pain    • Sleep disturbance          PAST SURGICAL HISTORY  Past Surgical History:   Procedure Laterality Date   • BASAL CELL CARCINOMA EXCISION      Nose   • COLONOSCOPY  10/10/2007    [facility]   • MOUTH SURGERY           FAMILY HISTORY  Family History   Problem Relation Age of Onset   • Heart disease Father    • Lung disease Father    • Hypertension Father    • Diabetes Brother    • Cancer Brother    • Stroke Brother          SOCIAL HISTORY  Social History     Socioeconomic History   • Marital status:      Spouse name: Not on file   • Number of children: Not on file   • Years of education: Not on file   • Highest education level: Not on file   Tobacco Use    • Smoking status: Never Smoker   • Smokeless tobacco: Never Used   Vaping Use   • Vaping Use: Never used   Substance and Sexual Activity   • Alcohol use: Yes     Comment: occasional   • Drug use: No   • Sexual activity: Defer         ALLERGIES  Ciprofloxacin, Penicillins, and Shellfish-derived products        REVIEW OF SYSTEMS  Review of Systems   Constitutional: Negative for chills and fever.   Respiratory: Negative.    Cardiovascular: Negative.    Gastrointestinal: Positive for abdominal pain and nausea. Negative for diarrhea and vomiting.   Genitourinary: Positive for frequency.   Musculoskeletal: Negative.    Skin: Negative.         All systems reviewed and negative except for those discussed in HPI.       PHYSICAL EXAM    I have reviewed the triage vital signs and nursing notes.    ED Triage Vitals   Temp Heart Rate Resp BP SpO2   07/10/21 1459 07/10/21 1459 07/10/21 1459 07/10/21 1511 07/10/21 1459   97.4 °F (36.3 °C) 85 16 115/77 98 %      Temp src Heart Rate Source Patient Position BP Location FiO2 (%)   -- -- 07/10/21 1511 07/10/21 1511 --     Sitting Left arm        Physical Exam  GENERAL: WDWN male in no acute distress  HENT: nares patent  EYES: no scleral icterus  CV: regular rhythm, regular rate  RESPIRATORY: normal effort  ABDOMEN: TTP left lower quadrant, no guarding, no rebound, no mass, bowel sounds normal  MUSCULOSKELETAL: no deformity  NEURO: alert, moves all extremities, follows commands  SKIN: warm, dry        LAB RESULTS  Recent Results (from the past 24 hour(s))   Comprehensive Metabolic Panel    Collection Time: 07/10/21  3:34 PM    Specimen: Blood   Result Value Ref Range    Glucose 88 65 - 99 mg/dL    BUN 10 8 - 23 mg/dL    Creatinine 0.95 0.76 - 1.27 mg/dL    Sodium 137 136 - 145 mmol/L    Potassium 4.1 3.5 - 5.2 mmol/L    Chloride 107 98 - 107 mmol/L    CO2 23.7 22.0 - 29.0 mmol/L    Calcium 8.4 (L) 8.6 - 10.5 mg/dL    Total Protein 5.6 (L) 6.0 - 8.5 g/dL    Albumin 3.60 3.50 - 5.20  g/dL    ALT (SGPT) 27 1 - 41 U/L    AST (SGOT) 21 1 - 40 U/L    Alkaline Phosphatase 81 39 - 117 U/L    Total Bilirubin 0.5 0.0 - 1.2 mg/dL    eGFR Non African Amer 77 >60 mL/min/1.73    Globulin 2.0 gm/dL    A/G Ratio 1.8 g/dL    BUN/Creatinine Ratio 10.5 7.0 - 25.0    Anion Gap 6.3 5.0 - 15.0 mmol/L   Lipase    Collection Time: 07/10/21  3:34 PM    Specimen: Blood   Result Value Ref Range    Lipase 40 13 - 60 U/L   CBC Auto Differential    Collection Time: 07/10/21  3:34 PM    Specimen: Blood   Result Value Ref Range    WBC 7.67 3.40 - 10.80 10*3/mm3    RBC 4.00 (L) 4.14 - 5.80 10*6/mm3    Hemoglobin 12.7 (L) 13.0 - 17.7 g/dL    Hematocrit 37.0 (L) 37.5 - 51.0 %    MCV 92.5 79.0 - 97.0 fL    MCH 31.8 26.6 - 33.0 pg    MCHC 34.3 31.5 - 35.7 g/dL    RDW 12.1 (L) 12.3 - 15.4 %    RDW-SD 41.4 37.0 - 54.0 fl    MPV 9.6 6.0 - 12.0 fL    Platelets 223 140 - 450 10*3/mm3    Neutrophil % 61.5 42.7 - 76.0 %    Lymphocyte % 27.1 19.6 - 45.3 %    Monocyte % 9.5 5.0 - 12.0 %    Eosinophil % 1.2 0.3 - 6.2 %    Basophil % 0.4 0.0 - 1.5 %    Immature Grans % 0.3 0.0 - 0.5 %    Neutrophils, Absolute 4.72 1.70 - 7.00 10*3/mm3    Lymphocytes, Absolute 2.08 0.70 - 3.10 10*3/mm3    Monocytes, Absolute 0.73 0.10 - 0.90 10*3/mm3    Eosinophils, Absolute 0.09 0.00 - 0.40 10*3/mm3    Basophils, Absolute 0.03 0.00 - 0.20 10*3/mm3    Immature Grans, Absolute 0.02 0.00 - 0.05 10*3/mm3    nRBC 0.0 0.0 - 0.2 /100 WBC   Urinalysis With Microscopic If Indicated (No Culture) - Urine, Clean Catch    Collection Time: 07/10/21  5:21 PM    Specimen: Urine, Clean Catch   Result Value Ref Range    Color, UA Dark Yellow (A) Yellow, Straw    Appearance, UA Clear Clear    pH, UA 6.5 5.0 - 8.0    Specific Gravity, UA 1.017 1.005 - 1.030    Glucose, UA Negative Negative    Ketones, UA Trace (A) Negative    Bilirubin, UA Negative Negative    Blood, UA Negative Negative    Protein, UA Negative Negative    Leuk Esterase, UA Negative Negative    Nitrite, UA  Negative Negative    Urobilinogen, UA 0.2 E.U./dL 0.2 - 1.0 E.U./dL   Clostridium Difficile Toxin, PCR - Stool, Per Rectum    Collection Time: 07/10/21  7:14 PM    Specimen: Per Rectum; Stool   Result Value Ref Range    C. Difficile Toxins by PCR Negative Negative       Ordered the above labs and independently reviewed the results.        RADIOLOGY  CT Abdomen Pelvis With Contrast    Result Date: 7/10/2021  CT OF THE ABDOMEN AND PELVIS WITH CONTRAST  HISTORY: 80-year-old male with a history of diarrhea and left lower quadrant with left flank pain that is been intermittent and ongoing for the past 2 weeks.  TECHNIQUE: Contiguous axial images were obtained through the abdomen and pelvis following intravenous administration of nonionic contrast. Oral contrast  was not administered. Radiation dose reduction techniques were utilized, including automated exposure control and exposure modulation based on body size.  COMPARISON: None.  FINDINGS: The visualized portion of the lung bases demonstrate subpleural interstitial opacification in the bilateral lower lobes and at the bases of the right middle lobe and lingula with involvement of the left lower lobe to a greater degree than the other described portions of the lungs. The lungs are clear of consolidation. There is a 0.5 cm pleural-based noncalcified nodule in the right lower lobe. There is a 1.1 cm subpleural nodular opacity in the left lower lobe that likely represents parenchymal scarring. The visualized portion of the heart appears normal. The liver demonstrates an internal 1.8 cm simple cyst. No other abnormality of the liver is appreciated. The pancreas appears normal. There is a 4.1 cm left renal simple cyst. There is a 2.2 cm right renal simple cyst as well as a 1.1 cm right renal simple cyst. The adrenal glands appear normal. The spleen has a normal appearance. There is thickening of the wall of the descending and sigmoid portions of the colon with relative  sparing of the rectum. Mild retention of stool is seen throughout the colon. The prostate is enlarged and measures on the order of 7.0 x 6.0 x 6.2 cm. The urinary bladder has a normal appearance. The osseous structures of the lumbar spine and pelvis appear normal. Mild atheromatous calcification of the abdominal aorta is noted.      1. There is thickening of the wall of the descending and sigmoid portions of the colon. This raises concern for regional colitis. 2. Prostatomegaly. 3. There is bilateral lower lobe, right middle lobe and lingular interstitial opacification seen diffusely throughout the lung bases. This is suggestive of early changes of interstitial fibrosis. A follow-up high-resolution CT of the chest is recommended. 4. There is a 5 mm nodule at the base of the right lower lobe as well as a 1.1 cm nodule at the base of the left lower lobe. A follow-up CT of the chest in 6 months is recommended.  This report was finalized on 7/10/2021 7:17 PM by Dr. Darin Hare M.D.        I ordered the above noted radiological studies. Reviewed by me and discussed with radiologist.  See dictation for official radiology interpretation.      PROCEDURES    Procedures      MEDICATIONS GIVEN IN ER    Medications   sodium chloride 0.9 % flush 10 mL (has no administration in time range)   iopamidol (ISOVUE-300) 61 % injection 100 mL (95 mL Intravenous Given by Other 7/10/21 1812)         PROGRESS, DATA ANALYSIS, CONSULTS, AND MEDICAL DECISION MAKING    All labs have been independently reviewed by me.  All radiology studies have been reviewed by me and discussed with radiologist dictating the report.   EKG's independently viewed and interpreted by me.  Discussion below represents my analysis of pertinent findings related to patient's condition, differential diagnosis, treatment plan and final disposition.    DDx includes but is not limited to: Diverticulitis, infectious colitis, hernia, epiploic appendagitis, appendicitis,  pancreatitis, ureteral stone.  Will order a CBC, CMP, lipase, UA and CT abdomen pelvis with IV contrast to further evaluate the patient.  Please see below for MDM and course of care    ED Course as of Jul 10 2033   Sat Jul 10, 2021   2025 WBC: 7.67 [RC]   2025 RBC(!): 4.00 [RC]   2025 Hemoglobin(!): 12.7 [RC]   2025 Hematocrit(!): 37.0 [RC]   2025 Platelets: 223 [RC]   2026 Glucose: 88 [RC]   2026 BUN: 10 [RC]   2026 Creatinine: 0.95 [RC]   2026 Sodium: 137 [RC]   2026 Potassium: 4.1 [RC]   2026 CO2: 23.7 [RC]   2026 Anion Gap: 6.3 [RC]   2026 Lipase: 40 [RC]   2026 Clostridium difficile (toxin A/B): Negative [RC]   2026 Nitrite, UA: Negative [RC]   2026 Leukocytes, UA: Negative [RC]   2026 Blood, UA: Negative [RC]   2026 Patient CT abdomen pelvis demonstrates mild colitis without abscess or complication.  There are also some pulmonary nodules that the patient will need to follow-up on.  The patient's PCR stool panel is pending.  Plan to treat conservatively with a course of Flagyl and have the patient to follow-up with GI for further management.  He is to return to the emergency department should he have any further concerns.    [RC]      ED Course User Index  [RC] Nickolas Barton III, PA       Patient was placed in face mask in first look. Patient was wearing facemask when I entered the room and throughout our encounter. I wore full protective equipment throughout this patient encounter including a face mask, and gloves. Hand hygiene was performed before donning protective equipment and after removal when leaving the room.    AS OF 20:33 EDT VITALS:    BP - 135/82  HR - 61  TEMP - 97.4 °F (36.3 °C)  O2 SATS - 99%        DIAGNOSIS  Final diagnoses:   Colitis         DISPOSITION  DISCHARGE    Patient discharged in stable condition.    Reviewed implications of results, diagnosis, meds, responsibility to follow up, warning signs and symptoms of possible worsening, potential complications and reasons to return  to ER.    Patient/Family voiced understanding of above instructions.    Discussed plan for discharge, as there is no emergent indication for admission. Patient referred to primary care provider for BP management due to today's BP. Pt/family is agreeable and understands need for follow up and repeat testing.  Pt is aware that discharge does not mean that nothing is wrong but it indicates no emergency is present that requires admission and they must continue care with follow-up as given below or physician of their choice.     FOLLOW-UP  Gamaliel Hobbs MD  [address] [id number] [phone]    Schedule an appointment as soon as possible for a visit   For further evaluation and treatment         Medication List      New Prescriptions    metroNIDAZOLE 500 MG tablet  Commonly known as: FLAGYL  Take 1 tablet by mouth 2 (Two) Times a Day.     ondansetron ODT 4 MG disintegrating tablet  Commonly known as: Zofran ODT  Place 1 tablet on the tongue Every 8 (Eight) Hours As Needed for Nausea or Vomiting.           Where to Get Your Medications      [facility] [address] [facility] [address] [facility] [employer] [place] - [phone] [facility] [phone]   [address] [facility] [address]    Phone: [phone]   · metroNIDAZOLE 500 MG tablet  · ondansetron ODT 4 MG disintegrating tablet                Nickolas Barton III, PA  07/10/21 2033

## 2021-07-10 NOTE — ED NOTES
Pt reports intermittent LLQ pain since 6/20. Pt reports 1-3 episodes of diarrhea per day. Denies N/V and recent abx use     Jamila Lynn RN  07/10/21 3374

## 2021-07-10 NOTE — TELEPHONE ENCOUNTER
On call    Urgent text, explosive diarrhea 2 to 3 weeks, urinary frequency and back pain.  Returned  patient's call, no answer left a detailed message  Go to the emergency room now for proper evaluation.  Potentially very serious.  This cannot wait until tomorrow or Monday,  go immediately to the ER.  I sent him a epic message as well.

## 2021-07-10 NOTE — ED NOTES
This RN wearing all appropriate PPE during patient encounter. Hand hygiene performed before and during entering room.       Libertad Pham, WALDEMAR  07/10/21 9833

## 2021-07-11 NOTE — ED PROVIDER NOTES
MD ATTESTATION NOTE    The SULEMAN and I have discussed this patient's history, physical exam, and treatment plan.  I have reviewed the documentation and personally had a face to face interaction with the patient. I affirm the documentation and agree with the treatment and plan.  The attached note describes my personal findings.      History  80-year-old male presents with diarrhea and left lower quadrant abdominal pain ongoing over the last several weeks.    Physical Exam  Vital Signs reviewed  Alert, Well Appearing in NAD  Abdomen-no significant tenderness to palpation    Disposition  I discussed treatment evaluation this patient with SILVA Barton.  Labs are reviewed and are fairly unremarkable without evidence of significant dehydration or severe infection.  CT scan showing diffuse colitis.  We have sent off stool studies for stool PCR and C. difficile testing but these are not back yet.  At this point will DC home on oral Flagyl.  Will give GI follow-up for further evaluation as outpatient.     Tashi Giles MD  07/10/21 2024

## 2021-07-12 ENCOUNTER — TELEPHONE (OUTPATIENT)
Dept: FAMILY MEDICINE CLINIC | Facility: CLINIC | Age: 81
End: 2021-07-12

## 2021-07-13 ENCOUNTER — PATIENT OUTREACH (OUTPATIENT)
Dept: CASE MANAGEMENT | Facility: OTHER | Age: 81
End: 2021-07-13

## 2021-07-13 NOTE — OUTREACH NOTE
Ambulatory Case Management Note    Care Evaluation    Questions/Answers      Most Recent Value   Suggested Appointments  Other (See Comment) [Make an appointment with Dr. Hobbs as instructed in AVS instructions. ]   Medication Adherence  Medications understood [Patient taking as directed. ]      Patient Outreach    Introduced self, explained ACM RN role and provided contact information. Spoke with patient regarding his health and wellness after the ED visit. Patient states he is feeling much better. The frequency with urination has returned to normal. Patient also is not having diarrhea. Patient is taking medications as directed. [facility] recommended following up with Dr. Hobbs if the GI symptoms returned. Patient verbalized understanding. No further calls scheduled at this time.     There are no recently modified care plans to display for this patient.      Lynn Rivera RN  Ambulatory Case Management    7/13/2021, 14:45 EDT

## 2021-07-15 ENCOUNTER — PATIENT OUTREACH (OUTPATIENT)
Dept: CASE MANAGEMENT | Facility: OTHER | Age: 81
End: 2021-07-15

## 2021-07-15 NOTE — OUTREACH NOTE
Ambulatory Case Management Note    Patient Outreach    Missed call from patient's line this morning. No message. Returned call to patient. Left voicemail instructing patient to return call and leave voicemail if needed. [facility] line provided.     Patient Outreach    Spoke with patient upon return call. Patient's GI symptoms have improved. No nausea or vomiting. However, patient states he is now constipated. ACM recommended taking Miralax daily until constipation resolved. Patient verbalized understanding. Patient also has a GI appointment scheduled for August per ACM recommendations. No other concerns at this time.    There are no recently modified care plans to display for this patient.      Lynn Rivera RN  Ambulatory Case Management    7/15/2021, 12:26 EDT

## 2021-07-22 ENCOUNTER — HOSPITAL ENCOUNTER (EMERGENCY)
Facility: HOSPITAL | Age: 81
Discharge: HOME | End: 2021-07-22
Attending: EMERGENCY MEDICINE | Admitting: EMERGENCY MEDICINE

## 2021-07-22 ENCOUNTER — APPOINTMENT (OUTPATIENT)
Dept: CT IMAGING | Facility: HOSPITAL | Age: 81
End: 2021-07-22

## 2021-07-22 ENCOUNTER — APPOINTMENT (OUTPATIENT)
Dept: GENERAL RADIOLOGY | Facility: HOSPITAL | Age: 81
End: 2021-07-22

## 2021-07-22 VITALS
BODY MASS INDEX: 28.49 KG/M2 | TEMPERATURE: 99.1 F | OXYGEN SATURATION: 93 % | RESPIRATION RATE: 20 BRPM | WEIGHT: 215 LBS | HEART RATE: 69 BPM | SYSTOLIC BLOOD PRESSURE: 129 MMHG | DIASTOLIC BLOOD PRESSURE: 68 MMHG | HEIGHT: 73 IN

## 2021-07-22 DIAGNOSIS — S39.012A STRAIN OF LUMBAR REGION, INITIAL ENCOUNTER: Primary | ICD-10-CM

## 2021-07-22 LAB
ALBUMIN SERPL-MCNC: 4 G/DL (ref 3.5–5.2)
ALBUMIN/GLOB SERPL: 1.8 G/DL
ALP SERPL-CCNC: 76 U/L (ref 39–117)
ALT SERPL W P-5'-P-CCNC: 53 U/L (ref 1–41)
ANION GAP SERPL CALCULATED.3IONS-SCNC: 7.5 MMOL/L (ref 5–15)
AST SERPL-CCNC: 35 U/L (ref 1–40)
BASOPHILS # BLD AUTO: 0.03 10*3/MM3 (ref 0–0.2)
BASOPHILS NFR BLD AUTO: 0.4 % (ref 0–1.5)
BILIRUB SERPL-MCNC: 0.9 MG/DL (ref 0–1.2)
BILIRUB UR QL STRIP: NEGATIVE
BUN SERPL-MCNC: 16 MG/DL (ref 8–23)
BUN/CREAT SERPL: 16.8 (ref 7–25)
CALCIUM SPEC-SCNC: 9.2 MG/DL (ref 8.6–10.5)
CHLORIDE SERPL-SCNC: 105 MMOL/L (ref 98–107)
CLARITY UR: CLEAR
CO2 SERPL-SCNC: 27.5 MMOL/L (ref 22–29)
COLOR UR: YELLOW
CREAT SERPL-MCNC: 0.95 MG/DL (ref 0.76–1.27)
DEPRECATED RDW RBC AUTO: 40.4 FL (ref 37–54)
EOSINOPHIL # BLD AUTO: 0.06 10*3/MM3 (ref 0–0.4)
EOSINOPHIL NFR BLD AUTO: 0.7 % (ref 0.3–6.2)
ERYTHROCYTE [DISTWIDTH] IN BLOOD BY AUTOMATED COUNT: 12.2 % (ref 12.3–15.4)
GFR SERPL CREATININE-BSD FRML MDRD: 76 ML/MIN/1.73
GLOBULIN UR ELPH-MCNC: 2.2 GM/DL
GLUCOSE SERPL-MCNC: 93 MG/DL (ref 65–99)
GLUCOSE UR STRIP-MCNC: NEGATIVE MG/DL
HCT VFR BLD AUTO: 38.9 % (ref 37.5–51)
HGB BLD-MCNC: 13.4 G/DL (ref 13–17.7)
HGB UR QL STRIP.AUTO: NEGATIVE
HOLD SPECIMEN: NORMAL
HOLD SPECIMEN: NORMAL
IMM GRANULOCYTES # BLD AUTO: 0.02 10*3/MM3 (ref 0–0.05)
IMM GRANULOCYTES NFR BLD AUTO: 0.2 % (ref 0–0.5)
KETONES UR QL STRIP: NEGATIVE
LEUKOCYTE ESTERASE UR QL STRIP.AUTO: NEGATIVE
LIPASE SERPL-CCNC: 27 U/L (ref 13–60)
LYMPHOCYTES # BLD AUTO: 1.54 10*3/MM3 (ref 0.7–3.1)
LYMPHOCYTES NFR BLD AUTO: 18.9 % (ref 19.6–45.3)
MCH RBC QN AUTO: 30.9 PG (ref 26.6–33)
MCHC RBC AUTO-ENTMCNC: 34.4 G/DL (ref 31.5–35.7)
MCV RBC AUTO: 89.8 FL (ref 79–97)
MONOCYTES # BLD AUTO: 0.62 10*3/MM3 (ref 0.1–0.9)
MONOCYTES NFR BLD AUTO: 7.6 % (ref 5–12)
NEUTROPHILS NFR BLD AUTO: 5.88 10*3/MM3 (ref 1.7–7)
NEUTROPHILS NFR BLD AUTO: 72.2 % (ref 42.7–76)
NITRITE UR QL STRIP: NEGATIVE
NRBC BLD AUTO-RTO: 0 /100 WBC (ref 0–0.2)
PH UR STRIP.AUTO: 7.5 [PH] (ref 5–8)
PLATELET # BLD AUTO: 231 10*3/MM3 (ref 140–450)
PMV BLD AUTO: 9.6 FL (ref 6–12)
POTASSIUM SERPL-SCNC: 4.5 MMOL/L (ref 3.5–5.2)
PROT SERPL-MCNC: 6.2 G/DL (ref 6–8.5)
PROT UR QL STRIP: NEGATIVE
RBC # BLD AUTO: 4.33 10*6/MM3 (ref 4.14–5.8)
SODIUM SERPL-SCNC: 140 MMOL/L (ref 136–145)
SP GR UR STRIP: 1.01 (ref 1–1.03)
UROBILINOGEN UR QL STRIP: NORMAL
WBC # BLD AUTO: 8.15 10*3/MM3 (ref 3.4–10.8)
WHOLE BLOOD HOLD SPECIMEN: NORMAL

## 2021-07-22 PROCEDURE — 96375 TX/PRO/DX INJ NEW DRUG ADDON: CPT

## 2021-07-22 PROCEDURE — 72110 X-RAY EXAM L-2 SPINE 4/>VWS: CPT

## 2021-07-22 PROCEDURE — 83690 ASSAY OF LIPASE: CPT | Performed by: PHYSICIAN ASSISTANT

## 2021-07-22 PROCEDURE — 99284 EMERGENCY DEPT VISIT MOD MDM: CPT

## 2021-07-22 PROCEDURE — 81003 URINALYSIS AUTO W/O SCOPE: CPT | Performed by: PHYSICIAN ASSISTANT

## 2021-07-22 PROCEDURE — 74177 CT ABD & PELVIS W/CONTRAST: CPT

## 2021-07-22 PROCEDURE — 96374 THER/PROPH/DIAG INJ IV PUSH: CPT

## 2021-07-22 PROCEDURE — 25010000002 ONDANSETRON PER 1 MG: Performed by: EMERGENCY MEDICINE

## 2021-07-22 PROCEDURE — 80053 COMPREHEN METABOLIC PANEL: CPT | Performed by: PHYSICIAN ASSISTANT

## 2021-07-22 PROCEDURE — 25010000002 MORPHINE PER 10 MG: Performed by: EMERGENCY MEDICINE

## 2021-07-22 PROCEDURE — 85025 COMPLETE CBC W/AUTO DIFF WBC: CPT | Performed by: PHYSICIAN ASSISTANT

## 2021-07-22 PROCEDURE — 25010000002 IOPAMIDOL 61 % SOLUTION: Performed by: EMERGENCY MEDICINE

## 2021-07-22 RX ORDER — SODIUM CHLORIDE 0.9 % (FLUSH) 0.9 %
10 SYRINGE (ML) INJECTION AS NEEDED
Status: DISCONTINUED | OUTPATIENT
Start: 2021-07-22 | End: 2021-07-22 | Stop reason: HOSPADM

## 2021-07-22 RX ORDER — CYCLOBENZAPRINE HCL 10 MG
10 TABLET ORAL 3 TIMES DAILY PRN
Qty: 21 TABLET | Refills: 0 | Status: SHIPPED | OUTPATIENT
Start: 2021-07-22 | End: 2022-12-27

## 2021-07-22 RX ORDER — MORPHINE SULFATE 2 MG/ML
4 INJECTION, SOLUTION INTRAMUSCULAR; INTRAVENOUS ONCE
Status: COMPLETED | OUTPATIENT
Start: 2021-07-22 | End: 2021-07-22

## 2021-07-22 RX ORDER — ONDANSETRON 2 MG/ML
4 INJECTION INTRAMUSCULAR; INTRAVENOUS ONCE
Status: COMPLETED | OUTPATIENT
Start: 2021-07-22 | End: 2021-07-22

## 2021-07-22 RX ADMIN — MORPHINE SULFATE 4 MG: 2 INJECTION, SOLUTION INTRAMUSCULAR; INTRAVENOUS at 06:29

## 2021-07-22 RX ADMIN — ONDANSETRON 4 MG: 2 INJECTION INTRAMUSCULAR; INTRAVENOUS at 06:29

## 2021-07-22 RX ADMIN — IOPAMIDOL 100 ML: 612 INJECTION, SOLUTION INTRAVENOUS at 05:15

## 2021-07-22 NOTE — ED NOTES
Pt just now states that he was recently diagnosed with colitis and is having some abdominal pain in the left flank area that started Makenzie Gamez, WALDEMAR  07/22/21 1730

## 2021-07-22 NOTE — ED PROVIDER NOTES
Pt presents to the ED c/o  left low back pain onset last night.  He reports that he was recently treated for colitis and the symptoms had resolved but this pain came suddenly last night.  He denies any lower extremity weakness, tingling, numbness.  He has had no bowel or bladder incontinence.  He has had some constipation for the last 2 days.  He denies abdominal pain.  No fever or chills.     On exam,   Awake and alert, no acute distress.  Normal work of breathing.  Cranial nerves II through XII grossly intact.  Speech is fluent and clear.     Plan: CT abdomen pelvis reviewed and reassuring.  There is no evidence of colitis, ureteral calculus, other acute intra-abdominal pathology.  He has no red flag historical symptoms to warrant further imaging of the lumbar spine at this time.  Home with Tylenol, Flexeril as needed for pain, follow-up with PCP, return precautions were discussed.      I wore a mask, face shield, and gloves during this patient encounter.  Patient also wearing a surgical mask.  Hand hygeine performed before and after seeing the patient.     Attestation:  The SULEMAN and I have discussed this patient's history, physical exam, and treatment plan.  I have reviewed the documentation and personally had a face to face interaction with the patient. I affirm the documentation and agree with the treatment and plan.  The attached note describes my personal findings.            Sharad Singh MD  07/22/21 5905

## 2021-07-22 NOTE — DISCHARGE INSTRUCTIONS
Return to ER for any worsening pain, weakness in legs, incontinence    Take over-the-counter Tylenol, as well as MiraLAX.

## 2021-07-22 NOTE — ED PROVIDER NOTES
EMERGENCY DEPARTMENT ENCOUNTER    Room Number:  04/04  Date of encounter:  7/22/2021  PCP: Arminda Raymundo MD  Historian: Patient      I used full protective equipment while examining this patient.  This includes face mask, gloves and protective eyewear.  I washed my hands before entering the room and immediately upon leaving the room      HPI:  Chief Complaint: Low back pain  A complete HPI/ROS/PMH/PSH/SH/FH are unobtainable due to: Nothing    Context: Lucho Blank is a 81 y.o. male who presents to the ED c/o sudden onset of low left back pain at approximately midnight.  Patient states he went to bed in his normal state of health, however when he woke up to go to the restroom last night he was experiencing pain in his left low back.  This pain is well localized to his left lumbar area.  There is no radiation of pain down his legs.  He denies any numbness, tingling, weakness of his extremities.  He denies any incontinence.  He states the pain is sharp, intermittent, stabbing.  It is moderate in severity.  The pain is worse with movement and improves at rest.  He denies any known trauma or lifting.  He denies any previous similar symptoms.  He denies any abdominal pain, fever, vomiting, diarrhea.    Review of Medical Records  I reviewed ER visit from 7/10/2021.  Patient seen for colitis.  Patient states he improved quickly after taking antibiotics after this visit.  He states for the past several days he has had normal bowel movements.    PAST MEDICAL HISTORY  Active Ambulatory Problems     Diagnosis Date Noted   • No Active Ambulatory Problems     Resolved Ambulatory Problems     Diagnosis Date Noted   • No Resolved Ambulatory Problems     Past Medical History:   Diagnosis Date   • Allergic    • Anxiety    • Anxiety disorder    • Cervical pain    • Chest pain    • CPAP (continuous positive airway pressure) dependence    • History of EKG 03/20/2014   • Hyperlipidemia    • Hypertension    • Increased serum lipids    •  Insomnia    • Left ear pain    • Right knee pain    • Sleep disturbance          PAST SURGICAL HISTORY  Past Surgical History:   Procedure Laterality Date   • BASAL CELL CARCINOMA EXCISION      Nose   • COLONOSCOPY  10/10/2007    [facility]   • MOUTH SURGERY           FAMILY HISTORY  Family History   Problem Relation Age of Onset   • Heart disease Father    • Lung disease Father    • Hypertension Father    • Diabetes Brother    • Cancer Brother    • Stroke Brother          SOCIAL HISTORY  Social History     Socioeconomic History   • Marital status:      Spouse name: Not on file   • Number of children: Not on file   • Years of education: Not on file   • Highest education level: Not on file   Tobacco Use   • Smoking status: Never Smoker   • Smokeless tobacco: Never Used   Vaping Use   • Vaping Use: Never used   Substance and Sexual Activity   • Alcohol use: Yes     Comment: occasional   • Drug use: No   • Sexual activity: Defer         ALLERGIES  Ciprofloxacin, Penicillins, and Shellfish-derived products        REVIEW OF SYSTEMS  All systems reviewed and negative except for those discussed in HPI.       PHYSICAL EXAM    I have reviewed the triage vital signs and nursing notes.    ED Triage Vitals   Temp Heart Rate Resp BP SpO2   07/22/21 0136 07/22/21 0137 07/22/21 0137 07/22/21 0137 07/22/21 0137   96.6 °F (35.9 °C) 82 18 136/73 95 %      Temp src Heart Rate Source Patient Position BP Location FiO2 (%)   07/22/21 0343 07/22/21 0137 -- -- --   Oral Monitor          Physical Exam  GENERAL: Alert, oriented, not distressed  HENT: head atraumatic, no nuchal rigidity  EYES: no scleral icterus, EOMI  CV: regular rhythm, regular rate, no murmur  RESPIRATORY: normal effort, CTA  ABDOMEN: soft, nontender  MUSCULOSKELETAL: Mildly decreased range of motion of lumbar spine.  Mild tenderness to left flank area.  No midline vertebral tenderness  NEURO: alert, 5/5 strength in all extremities.  Negative straight  leg raise bilaterally.    SKIN: warm, dry, no rash        LAB RESULTS  Recent Results (from the past 24 hour(s))   Comprehensive Metabolic Panel    Collection Time: 07/22/21  3:43 AM    Specimen: Arm, Left; Blood   Result Value Ref Range    Glucose 93 65 - 99 mg/dL    BUN 16 8 - 23 mg/dL    Creatinine 0.95 0.76 - 1.27 mg/dL    Sodium 140 136 - 145 mmol/L    Potassium 4.5 3.5 - 5.2 mmol/L    Chloride 105 98 - 107 mmol/L    CO2 27.5 22.0 - 29.0 mmol/L    Calcium 9.2 8.6 - 10.5 mg/dL    Total Protein 6.2 6.0 - 8.5 g/dL    Albumin 4.00 3.50 - 5.20 g/dL    ALT (SGPT) 53 (H) 1 - 41 U/L    AST (SGOT) 35 1 - 40 U/L    Alkaline Phosphatase 76 39 - 117 U/L    Total Bilirubin 0.9 0.0 - 1.2 mg/dL    eGFR Non African Amer 76 >60 mL/min/1.73    Globulin 2.2 gm/dL    A/G Ratio 1.8 g/dL    BUN/Creatinine Ratio 16.8 7.0 - 25.0    Anion Gap 7.5 5.0 - 15.0 mmol/L   Lipase    Collection Time: 07/22/21  3:43 AM    Specimen: Arm, Left; Blood   Result Value Ref Range    Lipase 27 13 - 60 U/L   Green Top (Gel)    Collection Time: 07/22/21  3:43 AM   Result Value Ref Range    Extra Tube Hold for add-ons.    Lavender Top    Collection Time: 07/22/21  3:43 AM   Result Value Ref Range    Extra Tube hold for add-on    Gold Top - SST    Collection Time: 07/22/21  3:43 AM   Result Value Ref Range    Extra Tube Hold for add-ons.    CBC Auto Differential    Collection Time: 07/22/21  3:43 AM    Specimen: Arm, Left; Blood   Result Value Ref Range    WBC 8.15 3.40 - 10.80 10*3/mm3    RBC 4.33 4.14 - 5.80 10*6/mm3    Hemoglobin 13.4 13.0 - 17.7 g/dL    Hematocrit 38.9 37.5 - 51.0 %    MCV 89.8 79.0 - 97.0 fL    MCH 30.9 26.6 - 33.0 pg    MCHC 34.4 31.5 - 35.7 g/dL    RDW 12.2 (L) 12.3 - 15.4 %    RDW-SD 40.4 37.0 - 54.0 fl    MPV 9.6 6.0 - 12.0 fL    Platelets 231 140 - 450 10*3/mm3    Neutrophil % 72.2 42.7 - 76.0 %    Lymphocyte % 18.9 (L) 19.6 - 45.3 %    Monocyte % 7.6 5.0 - 12.0 %    Eosinophil % 0.7 0.3 - 6.2 %    Basophil % 0.4 0.0 - 1.5 %     Immature Grans % 0.2 0.0 - 0.5 %    Neutrophils, Absolute 5.88 1.70 - 7.00 10*3/mm3    Lymphocytes, Absolute 1.54 0.70 - 3.10 10*3/mm3    Monocytes, Absolute 0.62 0.10 - 0.90 10*3/mm3    Eosinophils, Absolute 0.06 0.00 - 0.40 10*3/mm3    Basophils, Absolute 0.03 0.00 - 0.20 10*3/mm3    Immature Grans, Absolute 0.02 0.00 - 0.05 10*3/mm3    nRBC 0.0 0.0 - 0.2 /100 WBC   Urinalysis With Microscopic If Indicated (No Culture) - Urine, Clean Catch    Collection Time: 07/22/21  3:56 AM    Specimen: Urine, Clean Catch   Result Value Ref Range    Color, UA Yellow Yellow, Straw    Appearance, UA Clear Clear    pH, UA 7.5 5.0 - 8.0    Specific Gravity, UA 1.014 1.005 - 1.030    Glucose, UA Negative Negative    Ketones, UA Negative Negative    Bilirubin, UA Negative Negative    Blood, UA Negative Negative    Protein, UA Negative Negative    Leuk Esterase, UA Negative Negative    Nitrite, UA Negative Negative    Urobilinogen, UA 1.0 E.U./dL 0.2 - 1.0 E.U./dL       Ordered the above labs and independently reviewed the results.        RADIOLOGY  CT Abdomen Pelvis With Contrast    Result Date: 7/22/2021  Patient: AMINA BUENO  Time Out: 06:14 Exam(s): CT ABDOMEN + PELVIS With Contrast EXAM:   CT Abdomen and Pelvis With Intravenous Contrast CLINICAL HISTORY:    Reason for exam: LLQ abdominal pain. TECHNIQUE:   Axial computed tomography images of the abdomen and pelvis with intravenous contrast.  CTDI is 10.51 mGy and DLP is 586 mGy-cm.  This CT exam was performed according to the principle of ALARA (As Low As Reasonably Achievable) by using one or more of the following dose reduction techniques: automated exposure control, adjustment of the mA and or kV according to patient size, and or use of iterative reconstruction technique. COMPARISON:   7 10 21 FINDINGS:   Lung bases:  Unremarkable.  No mass.  No consolidation.  ABDOMEN:   Liver:  No change in right hepatic lobe cyst.   Gallbladder and bile ducts:  Unremarkable.  No  calcified stones.  No ductal dilation.   Pancreas:  Unremarkable.  No mass.  No ductal dilation.   Spleen:  Unremarkable.  No splenomegaly.   Adrenals:  Unremarkable.  No mass.   Kidneys and ureters:  Bilateral cortical renal cystic lesions are again noted.  No hydronephrosis.   Stomach and bowel:  Unremarkable.  No obstruction.  No mucosal thickening.  PELVIS:   Appendix:  No findings to suggest acute appendicitis.   Bladder:  Unremarkable.  No mass.   Reproductive:  No significant change in moderate to marked indeterminate enlargement of the prostate gland.  ABDOMEN and PELVIS:   Intraperitoneal space:  Unremarkable.  No free air.  No significant fluid collection.   Bones joints:  No acute fracture.  No dislocation.   Soft tissues:  Unremarkable.   Vasculature:  Unremarkable.  No abdominal aortic aneurysm.   Lymph nodes:  Unremarkable.  No enlarged lymph nodes. IMPRESSION:       No significant interval changes.     Electronically signed by Ankit Hutcihns DO on 07-22-21 at 0614    XR Spine Lumbar Complete 4+VW    Result Date: 7/22/2021  Patient: AMINA BUENO  Time Out: 02:47 Exam(s): FILM L SPINE 4+ VIEWS EXAM:   XR Lumbosacral Spine Complete with Flexion Extension, 6 or More Views CLINICAL HISTORY:    Reason for exam: back pain. TECHNIQUE:   Lateral, frontal, oblique and lateral flexion extension views of the lumbar spine. COMPARISON:   No relevant prior studies available. FINDINGS:   Vertebrae:   No compression fracture.  Normal alignment.    Disc spaces:  Osteopenia.  Moderate degenerative disc disease.   Soft tissues:  Unremarkable. IMPRESSION:       No acute findings     Electronically signed by Gibran Stover M.D. on 07-22-21 at 0247      I ordered the above noted radiological studies. Reviewed by me and discussed with radiologist.  See dictation for official radiology interpretation.      MEDICATIONS GIVEN IN ER    Medications   sodium chloride 0.9 % flush 10 mL (has no administration in time range)    iopamidol (ISOVUE-300) 61 % injection 100 mL (100 mL Intravenous Given 7/22/21 0515)   morphine injection 4 mg (4 mg Intravenous Given 7/22/21 0629)   ondansetron (ZOFRAN) injection 4 mg (4 mg Intravenous Given 7/22/21 0629)         PROGRESS, DATA ANALYSIS, CONSULTS, AND MEDICAL DECISION MAKING    All labs have been independently reviewed by me.  All radiology studies have been reviewed by me and discussed with radiologist dictating the report.   EKG's independently viewed and interpreted by me.  Discussion below represents my analysis of pertinent findings related to patient's condition, differential diagnosis, treatment plan and final disposition.    I have discussed case with Dr. Singh, emergency room physician.  He has performed his own bedside examination and agrees with treatment plan.    ED Course as of Jul 22 0812   Thu Jul 22, 2021   0534 First look: Patient was seen in the emergency department on 7/10/2021 for lower abdominal and lower back pain.  He had a CT of the abdomen pelvis that showed colitis without complication.  Patient was treated empirically with Flagyl.  He states he finishes Flagyl up for 5 days ago and the pain is returned.  He states it is coming in waves and when it is at its worse as bad as a 7 or 8 out of 10 on the pain scale.  At present he has no pain.    [RC]   0536 To further evaluate the patient I will order a CBC, CMP, UA, lipase.  Will order a CT of the abdomen pelvis with IV contrast to ensure the patient has not developed any complications with his colitis and to ensure the colitis has not worsened.    [RC]   0614 Patient presents with several hour history of left low back pain.  Patient denies any radicular pain, neuro deficits, abdominal pain.  Differential diagnoses include but not limited to lumbar strain, ureterolithiasis, UTI, colitis.    [EE]   0615 WBC: 8.15 [EE]   0615 Hemoglobin: 13.4 [EE]   0615 Blood, UA: Negative [EE]   0615 Creatinine: 0.95 [EE]   0615 Total  Bilirubin: 0.9 [EE]   0619 Lumbar spine films interpreted by myself show mild degenerative changes without evidence of acute compression fracture.    [EE]   0619 Patient CT scan shows no evidence of colitis, ureterolithiasis.  I suspect patient's pain is likely musculoskeletal in nature.  There is no concerning radicular symptoms or neuro deficits.    [EE]   0737 Updated patient on CT findings and blood work.  Plan to treat with Flexeril and Tylenol.  Patient understands treatment plan.  No further questions.    [EE]      ED Course User Index  [EE] Darwin Villar PA  [RC] Nickolas Barton III, PA       AS OF 08:12 EDT VITALS:    BP - 129/68  HR - 69  TEMP - 99.1 °F (37.3 °C) (Oral)  O2 SATS - 93%        DIAGNOSIS  Final diagnoses:   Strain of lumbar region, initial encounter         DISPOSITION  Discharged           Darwin Villar PA  07/22/21 0812

## 2021-07-22 NOTE — ED TRIAGE NOTES
Pt to ed with complaints with left sided lower back pain. States the pain has been going on for about 4 weeks but increased in severity tonight. Denies injury or radiation down legs.

## 2021-07-23 ENCOUNTER — PATIENT OUTREACH (OUTPATIENT)
Dept: CASE MANAGEMENT | Facility: OTHER | Age: 81
End: 2021-07-23

## 2021-07-23 NOTE — OUTREACH NOTE
Ambulatory Case Management Note    General & Health Literacy Assessment    Questions/Answers      Most Recent Value   Assessment Completed With  Patient   Living Arrangement  Alone   Type of Residence  Private Residence   Equiptment Used at Home  None   Bed or Wheelchair Confined  No   Difficulty Keeping Appointments  No   Pentecostalism or Spiritual Beliefs that Impact Treatment  No   Chronic Pain  No   Health Literacy  Good        Care Evaluation    Questions/Answers      Most Recent Value   Suggested Appointments  Other (See Comment) [Keep follow up with Dr. Raymundo on Monday. ]   Annual Wellness Visit:   Patient Has Completed   Other Patient Education/Resources   MyChart   MyChart Education Method  -- [active]   Medication Adherence  Medications understood [Patient taking Flexeril, Tylenol, and Miralax as prescribed. ]      Patient Outreach    Introduced self, explained ACM RN role and provided contact information. Reviewed patient's recent ED visit. Patient states he is doing okay and is feeling better after starting Flexeril. Patient knows to also take Tylenol for pain and Miralax for bowel movement. Patient reports he has not had a BM in 4 days. ACM recommended Miralax daily with 2-3 glasses of water minimum daily. Patient verbalized understanding. Follow up scheduled in 2 weeks to assess continued needs and review gaps. Patient has PCP appointment scheduled with Dr. Raymundo Monday for continued care regarding his pain.     There are no recently modified care plans to display for this patient.      Lynn Rivera RN  Ambulatory Case Management    7/23/2021, 16:02 EDT

## 2021-07-26 ENCOUNTER — OFFICE VISIT (OUTPATIENT)
Dept: FAMILY MEDICINE CLINIC | Facility: CLINIC | Age: 81
End: 2021-07-26

## 2021-07-26 VITALS
HEIGHT: 73 IN | HEART RATE: 88 BPM | DIASTOLIC BLOOD PRESSURE: 68 MMHG | BODY MASS INDEX: 28.75 KG/M2 | SYSTOLIC BLOOD PRESSURE: 106 MMHG | TEMPERATURE: 96.6 F | WEIGHT: 216.9 LBS | OXYGEN SATURATION: 97 %

## 2021-07-26 DIAGNOSIS — K52.9 COLITIS: ICD-10-CM

## 2021-07-26 DIAGNOSIS — I10 ESSENTIAL HYPERTENSION: ICD-10-CM

## 2021-07-26 DIAGNOSIS — R91.1 SOLID NODULE OF LUNG GREATER THAN 8 MM IN DIAMETER: ICD-10-CM

## 2021-07-26 DIAGNOSIS — Z12.12 ENCOUNTER FOR COLORECTAL CANCER SCREENING: Primary | ICD-10-CM

## 2021-07-26 DIAGNOSIS — S33.5XXD LUMBAR SPRAIN, SUBSEQUENT ENCOUNTER: ICD-10-CM

## 2021-07-26 DIAGNOSIS — Z12.11 ENCOUNTER FOR COLORECTAL CANCER SCREENING: Primary | ICD-10-CM

## 2021-07-26 PROCEDURE — 99214 OFFICE O/P EST MOD 30 MIN: CPT | Performed by: FAMILY MEDICINE

## 2021-07-26 NOTE — PROGRESS NOTES
"Chief Complaint  Abdominal Pain (F/u from ED visits for colitis and lower back pain.) and Back Pain    Subjective          Lucho Blank presents to [facility]  History of Present Illness came here for follow-up from ER visit .  Patient went to ER twice this month.  First visit was almost 3 weeks ago for abdominal pain and diagnosed with colitis.  He was sent home on antibiotic.  He went back to ER again 2 weeks ago for sudden onset low back pain and unable to walk he has to call 911.  He was diagnosed with lumbar sprain and sent home on Flexeril.  His pain has improved and he has not taking any Flexeril today.  Denies any urinary or stool retention or incontinence.  Denies any weakness of legs.    Objective   Vital Signs:   /68   Pulse 88   Temp 96.6 °F (35.9 °C)   Ht 185.4 cm (73\")   Wt 98.4 kg (216 lb 14.4 oz)   SpO2 97%   BMI 28.62 kg/m²     Physical Exam  Constitutional:       General: He is not in acute distress.     Appearance: Normal appearance. He is well-developed.   HENT:      Head: Normocephalic and atraumatic.      Right Ear: Tympanic membrane normal.      Left Ear: Tympanic membrane normal.      Mouth/Throat:      Mouth: Mucous membranes are moist.   Eyes:      General:         Right eye: No discharge.         Left eye: No discharge.      Extraocular Movements: Extraocular movements intact.      Pupils: Pupils are equal, round, and reactive to light.   Cardiovascular:      Rate and Rhythm: Normal rate and regular rhythm.      Pulses: Normal pulses.      Heart sounds: Normal heart sounds.   Pulmonary:      Effort: Pulmonary effort is normal.      Breath sounds: Normal breath sounds. No wheezing or rales.   Abdominal:      General: Bowel sounds are normal.      Palpations: Abdomen is soft. There is no mass.      Tenderness: There is no abdominal tenderness. There is no right CVA tenderness or left CVA tenderness.   Musculoskeletal:      Cervical back: Normal " range of motion and neck supple.      Right lower leg: No edema.      Left lower leg: No edema.   Lymphadenopathy:      Cervical: No cervical adenopathy.   Neurological:      General: No focal deficit present.      Mental Status: He is alert and oriented to person, place, and time.        Result Review :     CMP    CMP 4/23/21 7/10/21 7/22/21   Glucose  88 93   Glucose 92     BUN 13 10 16   Creatinine 1.06 0.95 0.95   eGFR Non  Am 68 77 76   eGFR African Am 82     Sodium 141 137 140   Potassium 4.5 4.1 4.5   Chloride 103 107 105   Calcium 9.8 8.4 (A) 9.2   Total Protein 6.8     Albumin 4.80 3.60 4.00   Globulin 2.0     Total Bilirubin 1.2 0.5 0.9   Alkaline Phosphatase 106 81 76   AST (SGOT) 35 21 35   ALT (SGPT) 38 27 53 (A)   (A) Abnormal value       Comments are available for some flowsheets but are not being displayed.           CBC    CBC 11/17/20 7/10/21 7/22/21   WBC 9.90 7.67 8.15   RBC 4.62 4.00 (A) 4.33   Hemoglobin 14.5 12.7 (A) 13.4   Hematocrit 42.9 37.0 (A) 38.9   MCV 92.9 92.5 89.8   MCH 31.3 31.8 30.9   MCHC 33.7 34.3 34.4   RDW 12.5 12.1 (A) 12.2 (A)   Platelets 237 223 231   (A) Abnormal value            Data reviewed: Recent hospitalization notes er x2, ct scan     1. There is thickening of the wall of the descending and sigmoid  portions of the colon. This raises concern for regional colitis.  2. Prostatomegaly.  3. There is bilateral lower lobe, right middle lobe and lingular  interstitial opacification seen diffusely throughout the lung bases.  This is suggestive of early changes of interstitial fibrosis. A  follow-up high-resolution CT of the chest is recommended.  4. There is a 5 mm nodule at the base of the right lower lobe as well as  a 1.1 cm nodule at the base of the left lower lobe. A follow-up CT of  the chest in 6 months is recommended.     Assessment and Plan    Diagnoses and all orders for this visit:    1. Encounter for colorectal cancer screening (Primary)  -     Ambulatory  Referral to Gastroenterology    2. Colitis  -     Ambulatory Referral to Gastroenterology    3. Lumbar sprain, subsequent encounter    4. Essential hypertension    5. Solid nodule of lung greater than 8 mm in diameter  -     CT Chest Without Contrast; Future     Lucho Blank is a 81-year-old male patient came here for follow-up from ER and hypertension.  ER record reviewed.  His abdominal pain and diarrhea improved.  He again had a CT scan done in the ER on 22nd of this month and there was no sign of colitis and that CT scan.  Colitis symptoms improved.  He is due for colonoscopy.  Referred to GI.  Lumbar sprain, improving continue Flexeril as needed  .  Hypertension, denies any more episode of dizziness.  Blood pressure log reviewed blood pressure within range of 110//60.  Continue same.      Follow Up   No follow-ups on file.  Patient was given instructions and counseling regarding his condition or for health maintenance advice. Please see specific information pulled into the AVS if appropriate.

## 2021-08-06 ENCOUNTER — PATIENT OUTREACH (OUTPATIENT)
Dept: CASE MANAGEMENT | Facility: OTHER | Age: 81
End: 2021-08-06

## 2021-08-06 DIAGNOSIS — L60.1 ONYCHOLYSIS OF TOENAIL: Primary | ICD-10-CM

## 2021-08-06 NOTE — OUTREACH NOTE
Ambulatory Case Management Note    Care Evaluation    Questions/Answers      Most Recent Value   Suggested Appointments  Other (See Comment) [Patient has requested a podiatry referral for a toe nail issue. ]   Annual Wellness Visit:   Patient Has Completed   Care Gaps Addressed  Colon Cancer Screening, Pneumonia Vaccine, Other (See Comment) [Zoster]   Colon Cancer Screening Type  Colonoscopy   Colonoscopy Status  Patient Will Complete   Colon Cancer Screening Completion at [facility] or Other  [facility]   Colon Cancer Screening Comments  referral has been placed by PCP.    Pneumonia Vaccine Status  Up to Date   Pneumonia Vaccine Completion at [facility] or Other  Other   Care Gap Comments  Patient to review vaccines with Dr. Raymundo during next visit.    Other Patient Education/Resources   MyChart   [employer] Education Method  -- [active]   Advanced Directives:  Patient Has      Patient Outreach    Introduced self, explained ACM RN role and provided contact information. Spoke with patient regarding health and wellness. Patient states he is doing well. He has recently visited his PCP and has referrals for colonoscopy and podiatry pending for a colonoscopy and a toe nail falling off. Daughter is POA and has copy of Living Will. Patient knows to bring copy to PCP at next visit. Vaccines reviewed. Patient to review with PCP. No further needs at this time.     There are no recently modified care plans to display for this patient.      Lynn Rivera RN  Ambulatory Case Management    8/6/2021, 12:58 EDT

## 2021-08-09 ENCOUNTER — TELEPHONE (OUTPATIENT)
Dept: FAMILY MEDICINE CLINIC | Facility: CLINIC | Age: 81
End: 2021-08-09

## 2021-08-09 NOTE — TELEPHONE ENCOUNTER
Caller: Amina Bueno    Relationship: Self    Best call back number: [phone]   What is the best time to reach you: ANYTIME  Who are you requesting to speak with (clinical staff, provider,  specific staff member): CLINICAL    Do you know the name of the person who called: AMINA BUENO    What was the call regarding: PATIENT FEELS HE MIGHT BE HAVING PNEUMONIA SYMPTOMS. HE IS COUGHING AND SPITTING UP PHLEGM. HE WANTS TO KNOW WHAT DR. CHICAS WOULD LIKE HIM TO DO.     Do you require a callback: YES

## 2021-08-09 NOTE — TELEPHONE ENCOUNTER
Tried to contact back:    Hub please inform patient if call back:  Patient needs to make an appointment for tomorrow for evaluation of this. Has he had any other symptoms such as fever, SOB, Congestion?

## 2021-08-10 ENCOUNTER — OFFICE VISIT (OUTPATIENT)
Dept: FAMILY MEDICINE CLINIC | Facility: CLINIC | Age: 81
End: 2021-08-10

## 2021-08-10 VITALS
TEMPERATURE: 96.4 F | WEIGHT: 216 LBS | HEIGHT: 73 IN | HEART RATE: 80 BPM | SYSTOLIC BLOOD PRESSURE: 112 MMHG | BODY MASS INDEX: 28.63 KG/M2 | OXYGEN SATURATION: 96 % | DIASTOLIC BLOOD PRESSURE: 72 MMHG

## 2021-08-10 DIAGNOSIS — J06.9 URI, ACUTE: Primary | ICD-10-CM

## 2021-08-10 PROCEDURE — 99213 OFFICE O/P EST LOW 20 MIN: CPT | Performed by: FAMILY MEDICINE

## 2021-08-10 RX ORDER — GUAIFENESIN AND DEXTROMETHORPHAN HYDROBROMIDE 600; 30 MG/1; MG/1
1 TABLET, EXTENDED RELEASE ORAL 2 TIMES DAILY PRN
Qty: 20 TABLET | Refills: 0 | Status: SHIPPED | OUTPATIENT
Start: 2021-08-10 | End: 2021-10-26

## 2021-08-10 NOTE — PROGRESS NOTES
"Chief Complaint  Fever (C/o flashes of possible fever, coughwith spitting phlegm, and feeling 'blah' ), Cough, and Fatigue    Subjective          Lucho Blank presents to [facility]  History of Present Illness with complaints of fever, cough and fatigue.  Patient symptoms started 1 week ago.  His cough is on and off.  He is complaining of subjective fever , checking his temperature at home and is normal in all the time.  Denies any chills.  He is not taking anything over-the-counter.  denies any ear pain.  Denies any sinus pressure.  He is fully vaccinated for COVID-19.  Giving history of loss of taste and asthma.        Objective   Vital Signs:   /72   Pulse 80   Temp 96.4 °F (35.8 °C)   Ht 185.4 cm (73\")   Wt 98 kg (216 lb)   SpO2 96%   BMI 28.50 kg/m²     Physical Exam  Constitutional:       General: He is not in acute distress.     Appearance: Normal appearance. He is well-developed.   HENT:      Head: Normocephalic and atraumatic.      Right Ear: Tympanic membrane normal.      Left Ear: Tympanic membrane normal.      Mouth/Throat:      Mouth: Mucous membranes are moist.   Eyes:      General:         Right eye: No discharge.         Left eye: No discharge.      Extraocular Movements: Extraocular movements intact.      Pupils: Pupils are equal, round, and reactive to light.   Cardiovascular:      Rate and Rhythm: Normal rate and regular rhythm.      Pulses: Normal pulses.      Heart sounds: Normal heart sounds.   Pulmonary:      Effort: Pulmonary effort is normal.      Breath sounds: Normal breath sounds. No wheezing or rales.   Abdominal:      General: Bowel sounds are normal.      Palpations: Abdomen is soft. There is no mass.      Tenderness: There is no abdominal tenderness.   Musculoskeletal:      Cervical back: Normal range of motion and neck supple.      Right lower leg: No edema.      Left lower leg: No edema.   Lymphadenopathy:      Cervical: No cervical " adenopathy.   Neurological:      General: No focal deficit present.      Mental Status: He is alert and oriented to person, place, and time.        Result Review :                 Assessment and Plan    Diagnoses and all orders for this visit:    1. URI, acute (Primary)  -     COVID-19,[facility] ROUTINE, NP/OP SWAB IN TRANSPORT MEDIA OR ESWAB 72 HR TAT - Swab, Nasopharynx    Other orders  -     guaifenesin-dextromethorphan (MUCINEX DM)  MG tablet sustained-release 12 hour tablet; Take 1 tablet by mouth 2 (Two) Times a Day As Needed (COUGH).  Dispense: 20 tablet; Refill: 0    Lucho Blank is a 81-year-old male patient came here for 1 week history of on and off cough, fatigue and shortness of breath on exertion.  He is not taking anything over-the-counter I advised him to take Mucinex DM for cough.  I also advised him to continue taking cetirizine and Flonase nasal spray.  I will check him for COVID-19.  I advised patient to go to emergency room for any worsening of symptoms.  He will go to emergency room for worsening shortness of breath chest tightness    Follow Up   No follow-ups on file.  Patient was given instructions and counseling regarding his condition or for health maintenance advice. Please see specific information pulled into the AVS if appropriate.

## 2021-08-11 LAB
LABCORP SARS-COV-2, NAA 2 DAY TAT: NORMAL
SARS-COV-2 RNA RESP QL NAA+PROBE: NOT DETECTED

## 2021-08-12 ENCOUNTER — OFFICE VISIT (OUTPATIENT)
Dept: GASTROENTEROLOGY | Facility: CLINIC | Age: 81
End: 2021-08-12

## 2021-08-12 VITALS — BODY MASS INDEX: 28.23 KG/M2 | HEIGHT: 73 IN | WEIGHT: 213 LBS | TEMPERATURE: 97.7 F

## 2021-08-12 DIAGNOSIS — R35.89 POLYURIA: ICD-10-CM

## 2021-08-12 DIAGNOSIS — Z12.11 ENCOUNTER FOR SCREENING FOR MALIGNANT NEOPLASM OF COLON: Primary | ICD-10-CM

## 2021-08-12 PROCEDURE — 99204 OFFICE O/P NEW MOD 45 MIN: CPT | Performed by: INTERNAL MEDICINE

## 2021-08-12 NOTE — PROGRESS NOTES
Chief Complaint   Patient presents with   • ER follow up     Lucho Blank is a 81 y.o. male who presents with a history of left flank pain and abnormal CT  HPI     Patient 81-year-old male with history of hypertension hyperlipidemia as well as obstructive sleep apnea on CPAP who presents after ER visit.  Patient seen July 10 with left flank pain and questionable thickening in the left colon.  Patient felt improved and was discharged home but 12 days later had recurrent pain and was resent to the ER no further diarrhea was noted repeat CAT scan was normal.  Patient felt to be having back spasms and given Flexeril with good response.  Patient's only complaint at this point is significant polyuria and frequency.  Patient denies abnormal color and no burning but just frequent urination.  Patient here for evaluation for his bowel though normalized as he has not had a colonoscopy in 14 years.    Past Medical History:   Diagnosis Date   • Allergic    • Anxiety    • Anxiety disorder    • Cervical pain    • Chest pain     left upper   • CPAP (continuous positive airway pressure) dependence    • History of EKG 03/20/2014   • Hyperlipidemia    • Hypertension    • Increased serum lipids    • Insomnia    • Left ear pain     intermittent   • Right knee pain    • Sleep disturbance        Current Outpatient Medications:   •  aspirin 81 MG tablet, Take 1 tablet by mouth daily., Disp: , Rfl:   •  atorvastatin (LIPITOR) 20 MG tablet, TAKE 1 TABLET DAILY (NEED APPOINTMENT WITH NEW PRIMARY CARE PHYSICIAN FOR ADDITIONAL REFILLS), Disp: 90 tablet, Rfl: 3  •  Calcium Carbonate (CALCIUM 500 PO), Take  by mouth., Disp: , Rfl:   •  Coenzyme Q10 200 MG tablet, Take 1 tablet by mouth daily., Disp: , Rfl:   •  cyclobenzaprine (FLEXERIL) 10 MG tablet, Take 1 tablet by mouth 3 (Three) Times a Day As Needed for Muscle Spasms., Disp: 21 tablet, Rfl: 0  •  EPINEPHrine (EPIPEN 2-TANNER) 0.3 MG/0.3ML solution auto-injector injection, USE AS DIRECTED,  Disp: 1 each, Rfl: 3  •  escitalopram (LEXAPRO) 20 MG tablet, Take 1 tablet by mouth Daily., Disp: 90 tablet, Rfl: 3  •  guaifenesin-dextromethorphan (MUCINEX DM)  MG tablet sustained-release 12 hour tablet, Take 1 tablet by mouth 2 (Two) Times a Day As Needed (COUGH)., Disp: 20 tablet, Rfl: 0  •  loratadine (CLARITIN) 10 MG tablet, TAKE 1 TABLET DAILY, Disp: 90 tablet, Rfl: 3  •  Multiple Vitamin (MULTIVITAMIN) capsule, Take 1 capsule by mouth daily., Disp: , Rfl:   •  omeprazole (priLOSEC) 40 MG capsule, TAKE 1 CAPSULE DAILY, Disp: 90 capsule, Rfl: 3  •  ondansetron ODT (Zofran ODT) 4 MG disintegrating tablet, Place 1 tablet on the tongue Every 8 (Eight) Hours As Needed for Nausea or Vomiting., Disp: 15 tablet, Rfl: 0  •  RESTASIS 0.05 % ophthalmic emulsion, INSTILL 1 DROP IN BOTH EYES EVERY 12 HOURS, Disp: 180 each, Rfl: 3  •  valsartan (DIOVAN) 160 MG tablet, TAKE 1 TABLET DAILY, Disp: 90 tablet, Rfl: 3  •  VASCEPA 1 g capsule capsule, TAKE 2 CAPSULES ( 2 GM) TWICE A DAY WITH MEALS, Disp: 360 capsule, Rfl: 3  •  Vitamin D, Cholecalciferol, 25 MCG (1000 UT) capsule, Take 1 capsule by mouth Daily., Disp: 90 capsule, Rfl: 0  •  Xanax 2 MG tablet, TAKE 1 TABLET THREE TIMES A DAY AS NEEDED FOR ANXIETY, Disp: 270 tablet, Rfl: 0  Allergies   Allergen Reactions   • Ciprofloxacin Nausea Only   • Penicillins Hives   • Shellfish-Derived Products Hives     Social History     Socioeconomic History   • Marital status:      Spouse name: Not on file   • Number of children: Not on file   • Years of education: Not on file   • Highest education level: Not on file   Tobacco Use   • Smoking status: Never Smoker   • Smokeless tobacco: Never Used   Vaping Use   • Vaping Use: Never used   Substance and Sexual Activity   • Alcohol use: Yes     Alcohol/week: 0.0 standard drinks     Comment: occasional   • Drug use: No   • Sexual activity: Defer     Family History   Problem Relation Age of Onset   • Heart disease Father    •  Lung disease Father    • Hypertension Father    • Diabetes Brother    • Cancer Brother    • Stroke Brother      Review of Systems   Constitutional: Negative.    HENT: Negative.    Eyes: Negative.    Respiratory: Negative.    Cardiovascular: Negative.    Gastrointestinal: Negative.    Endocrine: Negative.    Genitourinary: Positive for frequency and urgency.   Musculoskeletal: Negative.    Skin: Negative.    Allergic/Immunologic: Negative.    Hematological: Negative.      Vitals:    08/12/21 1607   Temp: 97.7 °F (36.5 °C)     Physical Exam  Vitals and nursing note reviewed.   Constitutional:       Appearance: Normal appearance. He is well-developed and normal weight.   HENT:      Head: Normocephalic and atraumatic.   Eyes:      General: No scleral icterus.     Conjunctiva/sclera: Conjunctivae normal.   Neck:      Trachea: No tracheal deviation.   Cardiovascular:      Rate and Rhythm: Normal rate and regular rhythm.      Heart sounds: No murmur heard.   No friction rub. No gallop.    Pulmonary:      Effort: Pulmonary effort is normal. No respiratory distress.      Breath sounds: Normal breath sounds. No wheezing or rales.   Abdominal:      General: Bowel sounds are normal. There is no distension.      Palpations: Abdomen is soft. There is no mass.      Tenderness: There is no abdominal tenderness. There is no guarding or rebound.   Musculoskeletal:         General: No swelling or tenderness. Normal range of motion.      Cervical back: Normal range of motion and neck supple. No rigidity.   Skin:     General: Skin is warm and dry.      Coloration: Skin is not jaundiced.      Findings: No rash.   Neurological:      General: No focal deficit present.      Mental Status: He is alert and oriented to person, place, and time.      Cranial Nerves: No cranial nerve deficit.   Psychiatric:         Behavior: Behavior normal.         Thought Content: Thought content normal.         Judgment: Judgment normal.       Diagnoses and  all orders for this visit:    Encounter for screening for malignant neoplasm of colon  -     Case Request; Standing  -     Implement Anesthesia orders day of procedure.; Standing  -     Obtain informed consent; Standing  -     Case Request    Polyuria    Patient 81-year-old male with history of hypertension, hyperlipidemia and anxiety who presents to the ER x2 in the month of July with left flank pain.  The first time July 10 was associated with some diarrhea CAT scan revealed a decompressed colon with possibly some thickening in the left colon read as possible colitis.  Patient discharged home after Flexeril resolved the pain.  Patient returned on the 22nd at that time the stool was back to normal and the CAT scan appeared normal again patient was discharged home with pain meds and Flexeril but noted since that time with urinary retention.  Patient with significant frequency and on my review it appears that the prostate has actually enlarged between the 2 images question acute prostatitis.  Patient recommended to follow-up with his urologist tomorrow to get an appointment for further recommendations.  Patient's last colonoscopy in 2007 recommend repeat for screening but otherwise no GI complaints we will follow-up clinically.

## 2021-08-13 ENCOUNTER — TELEPHONE (OUTPATIENT)
Dept: GASTROENTEROLOGY | Facility: CLINIC | Age: 81
End: 2021-08-13

## 2021-09-01 ENCOUNTER — HOSPITAL ENCOUNTER (OUTPATIENT)
Facility: HOSPITAL | Age: 81
Setting detail: HOSPITAL OUTPATIENT SURGERY
Discharge: HOME | End: 2021-09-01
Attending: INTERNAL MEDICINE | Admitting: INTERNAL MEDICINE

## 2021-09-01 ENCOUNTER — ANESTHESIA (OUTPATIENT)
Dept: GASTROENTEROLOGY | Facility: HOSPITAL | Age: 81
End: 2021-09-01

## 2021-09-01 ENCOUNTER — ANESTHESIA EVENT (OUTPATIENT)
Dept: GASTROENTEROLOGY | Facility: HOSPITAL | Age: 81
End: 2021-09-01

## 2021-09-01 VITALS
TEMPERATURE: 97.1 F | DIASTOLIC BLOOD PRESSURE: 73 MMHG | WEIGHT: 208.3 LBS | BODY MASS INDEX: 27.61 KG/M2 | OXYGEN SATURATION: 96 % | SYSTOLIC BLOOD PRESSURE: 132 MMHG | RESPIRATION RATE: 16 BRPM | HEIGHT: 73 IN | HEART RATE: 68 BPM

## 2021-09-01 DIAGNOSIS — Z12.11 ENCOUNTER FOR SCREENING FOR MALIGNANT NEOPLASM OF COLON: ICD-10-CM

## 2021-09-01 PROCEDURE — 25010000002 PROPOFOL 10 MG/ML EMULSION: Performed by: NURSE ANESTHETIST, CERTIFIED REGISTERED

## 2021-09-01 PROCEDURE — G0121 COLON CA SCRN NOT HI RSK IND: HCPCS | Performed by: INTERNAL MEDICINE

## 2021-09-01 RX ORDER — LIDOCAINE HYDROCHLORIDE 10 MG/ML
0.5 INJECTION, SOLUTION INFILTRATION; PERINEURAL ONCE AS NEEDED
Status: DISCONTINUED | OUTPATIENT
Start: 2021-09-01 | End: 2021-09-01 | Stop reason: HOSPADM

## 2021-09-01 RX ORDER — SODIUM CHLORIDE, SODIUM LACTATE, POTASSIUM CHLORIDE, CALCIUM CHLORIDE 600; 310; 30; 20 MG/100ML; MG/100ML; MG/100ML; MG/100ML
1000 INJECTION, SOLUTION INTRAVENOUS CONTINUOUS
Status: DISCONTINUED | OUTPATIENT
Start: 2021-09-01 | End: 2021-09-01 | Stop reason: HOSPADM

## 2021-09-01 RX ORDER — PROPOFOL 10 MG/ML
VIAL (ML) INTRAVENOUS CONTINUOUS PRN
Status: DISCONTINUED | OUTPATIENT
Start: 2021-09-01 | End: 2021-09-01 | Stop reason: SURG

## 2021-09-01 RX ORDER — SODIUM CHLORIDE 0.9 % (FLUSH) 0.9 %
10 SYRINGE (ML) INJECTION AS NEEDED
Status: DISCONTINUED | OUTPATIENT
Start: 2021-09-01 | End: 2021-09-01 | Stop reason: HOSPADM

## 2021-09-01 RX ADMIN — SODIUM CHLORIDE, POTASSIUM CHLORIDE, SODIUM LACTATE AND CALCIUM CHLORIDE 1000 ML: 600; 310; 30; 20 INJECTION, SOLUTION INTRAVENOUS at 13:20

## 2021-09-01 RX ADMIN — PROPOFOL 100 MCG/KG/MIN: 10 INJECTION, EMULSION INTRAVENOUS at 13:54

## 2021-09-01 NOTE — ANESTHESIA POSTPROCEDURE EVALUATION
"Patient: Lucho Blank    Procedure Summary     Date: 09/01/21 Room / Location:  [facility]    Anesthesia Start: 1347 Anesthesia Stop: 1424    Procedure: COLONOSCOPY TO CECUM AND INTO TI (N/A ) Diagnosis:       Encounter for screening for malignant neoplasm of colon      Diverticulosis      Internal hemorrhoids      (Encounter for screening for malignant neoplasm of colon [Z12.11])    Surgeons: Gamaliel Hobbs MD Provider: Uli Marshall MD    Anesthesia Type: MAC ASA Status: 2          Anesthesia Type: MAC    Vitals  Vitals Value Taken Time   BP 92/54 09/01/21 1417   Temp     Pulse 70 09/01/21 1417   Resp 16 09/01/21 1417   SpO2 96 % 09/01/21 1417           Post Anesthesia Care and Evaluation    Patient location during evaluation: bedside  Patient participation: complete - patient participated  Level of consciousness: awake and alert  Pain management: adequate  Airway patency: patent  Anesthetic complications: No anesthetic complications    Cardiovascular status: acceptable  Respiratory status: acceptable  Hydration status: acceptable    Comments: BP 92/54 (BP Location: Left arm, Patient Position: Lying)   Pulse 70   Temp 36.2 °C (97.1 °F) (Oral)   Resp 16   Ht 185.4 cm (73\")   Wt 94.5 kg (208 lb 4.8 oz)   SpO2 96%   BMI 27.48 kg/m²       "

## 2021-09-01 NOTE — ANESTHESIA PREPROCEDURE EVALUATION
Anesthesia Evaluation     NPO Solid Status: > 8 hours  NPO Liquid Status: > 4 hours           Airway   Mallampati: II  TM distance: >3 FB  Neck ROM: full  no difficulty expected  Dental - normal exam     Pulmonary - normal exam   (+) sleep apnea,   Cardiovascular - normal exam    (+) hypertension, hyperlipidemia,       Neuro/Psych  (+) psychiatric history Anxiety,     GI/Hepatic/Renal/Endo    (+)  GERD,      Musculoskeletal     (+) neck pain,   Abdominal  - normal exam   Substance History      OB/GYN          Other      history of cancer                    Anesthesia Plan    ASA 2     MAC       Anesthetic plan, all risks, benefits, and alternatives have been provided, discussed and informed consent has been obtained with: patient.

## 2021-09-01 NOTE — DISCHARGE INSTRUCTIONS
For the next 24 hours patient needs to be with a responsible adult.    For 24 hours DO NOT drive, operate machinery, appliances, drink alcohol, make important decisions or sign legal documents.    Start with a light or bland diet if you are feeling sick to your stomach otherwise advance to regular diet as tolerated.    Follow recommendations on procedure report if provided by your doctor.    Call Dr Hobbs for problems [phone]    Problems may include but not limited to: large amounts of bleeding, trouble breathing, repeated vomiting, severe unrelieved pain, fever or chills.

## 2021-09-01 NOTE — BRIEF OP NOTE
COLONOSCOPY  Progress Note    Lucho Blank  9/1/2021    Pre-op Diagnosis:   Encounter for screening for malignant neoplasm of colon [Z12.11]       Post-Op Diagnosis Codes:     * Encounter for screening for malignant neoplasm of colon [Z12.11]     * Diverticulosis [K57.90]     * Internal hemorrhoids [K64.8]    Procedure/CPT® Codes:        Procedure(s):  COLONOSCOPY TO CECUM AND INTO TI    Surgeon(s):  Gamaliel Hobbs MD    Anesthesia: Monitored Anesthesia Care    Staff:   Endo Technician: Lissette Crook PCT  Endo Nurse: Mary Jane Crandall RN         Estimated Blood Loss: none    Urine Voided: * No values recorded between 9/1/2021  1:48 PM and 9/1/2021  2:15 PM *    Specimens:                None          Drains: * No LDAs found *    Findings: Colonoscopy the terminal ileum with normal ileum mucosa.  Sigmoid diverticulosis and internal hemorrhoids were identified.  The remainder the colonic mucosa was normal.    Complications: None          Gamaliel Hobbs MD     Date: 9/1/2021  Time: 14:18 EDT

## 2021-09-21 ENCOUNTER — OFFICE VISIT (OUTPATIENT)
Dept: NEUROLOGY | Facility: CLINIC | Age: 81
End: 2021-09-21

## 2021-09-21 VITALS
HEIGHT: 73 IN | WEIGHT: 218 LBS | SYSTOLIC BLOOD PRESSURE: 132 MMHG | BODY MASS INDEX: 28.89 KG/M2 | HEART RATE: 85 BPM | DIASTOLIC BLOOD PRESSURE: 70 MMHG | OXYGEN SATURATION: 95 %

## 2021-09-21 DIAGNOSIS — R41.3 MEMORY LOSS: Primary | ICD-10-CM

## 2021-09-21 DIAGNOSIS — F41.9 ANXIETY AND DEPRESSION: ICD-10-CM

## 2021-09-21 DIAGNOSIS — F32.A ANXIETY AND DEPRESSION: ICD-10-CM

## 2021-09-21 PROCEDURE — 99213 OFFICE O/P EST LOW 20 MIN: CPT | Performed by: NURSE PRACTITIONER

## 2021-09-21 RX ORDER — ESCITALOPRAM OXALATE 20 MG/1
20 TABLET ORAL DAILY
Qty: 90 TABLET | Refills: 3 | Status: SHIPPED | OUTPATIENT
Start: 2021-09-21 | End: 2022-09-20 | Stop reason: SDUPTHER

## 2021-09-21 NOTE — PROGRESS NOTES
Subjective:     Patient ID: Lucho Blank is a 81 y.o. male presenting for follow-up for memory loss and depression.  My last visit with the patient was on March 23, 2021.  Neuropsychology testing revealed major depressive disorder and some mild deficits with immediate and delayed memory.  He is not on any medications for memory loss. He is now taking Lexapro 20 mg daily. He feels this has helped quite a bit.  Since doing this he does feel like his memory has improved and his mood is much better.      History of Present Illness  The following portions of the patient's history were reviewed and updated as appropriate: allergies, current medications, past family history, past medical history, past social history, past surgical history and problem list.    Review of Systems   Constitutional: Negative for chills, fatigue and fever.   HENT: Negative for hearing loss, tinnitus and trouble swallowing.    Eyes: Negative for photophobia, pain and visual disturbance.   Respiratory: Positive for apnea. Negative for cough, shortness of breath and wheezing.    Cardiovascular: Negative for chest pain, palpitations and leg swelling.   Gastrointestinal: Negative for diarrhea, nausea and vomiting.   Endocrine: Negative for cold intolerance, heat intolerance and polydipsia.   Genitourinary: Negative for decreased urine volume, difficulty urinating and urgency.   Musculoskeletal: Negative for back pain, neck pain and neck stiffness.   Skin: Negative for color change, rash and wound.   Allergic/Immunologic: Negative for environmental allergies, food allergies and immunocompromised state.   Neurological: Negative for dizziness, tremors, seizures, syncope, facial asymmetry, speech difficulty, weakness, light-headedness, numbness and headaches.   Hematological: Negative for adenopathy. Does not bruise/bleed easily.   Psychiatric/Behavioral: Negative for confusion, decreased concentration and sleep disturbance.         Objective:    Neurologic Exam  General: Well nourished, well developed, and in no acute distress.  HEENT: Normocephalic/atraumatic. Mucous membranes moist. Sclerae anicteric.   Heart: Regular rate and rhythm. No murmurs, rubs or gallops.  Lungs: Clear to auscultation bilaterally.  Skin: No notable rashes or lesions on the visible surfaces.   Extremities: No clubbing, cyanosis or significant edema.   Psychiatric: Pleasant, cooperative, and appropriate.   Neurologic Exam:  Mental Status:  Alert and oriented. Speech is fluent. Comprehension is intact.   Cranial Nerves II-XII: Pupils equal, round, reactive to light. Extraocular movements are full and conjugate in all directions. Pursuit movements do not provoke any apparent dizziness or discomfort.  No nystagmus noted.  Hearing is intact to voice. Facial strength and sensation are preserved and symmetric. Tongue and palate midline. Voice non-hoarse, non-dysarthric.   Motor: Normal bulk and tone of bilateral upper and lower extremities. Strength is 5/5 in all 4 extremities both proximally and distally. There are no abnormal or involuntary movements noted.  Sensation: Intact to light touch throughout. Romberg was negative with no significant sway.   Coordination: Fully intact. Finger-to-nose performed accurately bilaterally.  Reflexes: The deep tendon reflexes are 2+/4 in bilateral biceps, brachioradialis, triceps, patella, and Achilles.  No pathologic reflexes were noted.  Gait: Normal. No ataxia or apraxia.         Physical Exam    Assessment/Plan:     Diagnoses and all orders for this visit:    1. Memory loss (Primary)    2. Anxiety and depression    Other orders  -     escitalopram (LEXAPRO) 20 MG tablet; Take 1 tablet by mouth Daily.  Dispense: 90 tablet; Refill: 3        The patient states he is doing very well.  He feels the Lexapro continues to work well for him.  No changes were made today.  He is to call with any problems, otherwise will follow-up  annually.

## 2021-10-14 RX ORDER — ICOSAPENT ETHYL 1000 MG/1
1 CAPSULE ORAL 2 TIMES DAILY WITH MEALS
Qty: 360 CAPSULE | Refills: 3 | Status: SHIPPED | OUTPATIENT
Start: 2021-10-14 | End: 2021-10-25 | Stop reason: SDUPTHER

## 2021-10-14 NOTE — TELEPHONE ENCOUNTER
Caller: Lucho Blank    Relationship: Self      Medication requested (name and dosage):  VASCEPA 1 g capsule capsule    Pharmacy where request should be sent: [facility] - [address] [phone] [facility] [phone]     Additional details provided by patient: PATIENT NEEDS THIS PRESCRIPTION RENEWED AND IS COMPLETELY OUT OF TABLETS.     Best call back number: [phone]     Does the patient have less than a 3 day supply:  [x] Yes  [] No    Joshua ROWE Rep   10/14/21 09:18 EDT

## 2021-10-14 NOTE — TELEPHONE ENCOUNTER
Rx Refill Note  Requested Prescriptions     Pending Prescriptions Disp Refills   • icosapent ethyl (Vascepa) 1 g capsule capsule 360 capsule 3      Last office visit with prescribing clinician: 8/10/2021      Next office visit with prescribing clinician: 10/26/2021            Fabiola Ayers MA  10/14/21, 09:21 EDT

## 2021-10-25 RX ORDER — ICOSAPENT ETHYL 1000 MG/1
1 CAPSULE ORAL 2 TIMES DAILY WITH MEALS
Qty: 360 CAPSULE | Refills: 3 | Status: SHIPPED | OUTPATIENT
Start: 2021-10-25 | End: 2023-01-03

## 2021-10-26 ENCOUNTER — OFFICE VISIT (OUTPATIENT)
Dept: FAMILY MEDICINE CLINIC | Facility: CLINIC | Age: 81
End: 2021-10-26

## 2021-10-26 VITALS
WEIGHT: 217.7 LBS | DIASTOLIC BLOOD PRESSURE: 70 MMHG | BODY MASS INDEX: 28.85 KG/M2 | TEMPERATURE: 97.1 F | HEART RATE: 83 BPM | HEIGHT: 73 IN | OXYGEN SATURATION: 96 % | SYSTOLIC BLOOD PRESSURE: 104 MMHG

## 2021-10-26 DIAGNOSIS — E78.2 MIXED HYPERLIPIDEMIA: ICD-10-CM

## 2021-10-26 DIAGNOSIS — R91.1 SOLID NODULE OF LUNG GREATER THAN 8 MM IN DIAMETER: ICD-10-CM

## 2021-10-26 DIAGNOSIS — I10 ESSENTIAL HYPERTENSION: Primary | ICD-10-CM

## 2021-10-26 PROCEDURE — 99213 OFFICE O/P EST LOW 20 MIN: CPT | Performed by: FAMILY MEDICINE

## 2021-10-26 PROCEDURE — G0008 ADMIN INFLUENZA VIRUS VAC: HCPCS | Performed by: FAMILY MEDICINE

## 2021-10-26 PROCEDURE — 90662 IIV NO PRSV INCREASED AG IM: CPT | Performed by: FAMILY MEDICINE

## 2021-10-26 RX ORDER — EPINEPHRINE 0.3 MG/.3ML
0.3 INJECTION SUBCUTANEOUS ONCE
Qty: 1 EACH | Refills: 0 | Status: SHIPPED | OUTPATIENT
Start: 2021-10-26 | End: 2021-10-26

## 2021-10-26 NOTE — PROGRESS NOTES
"Chief Complaint  Hyperlipidemia (3 mth f/u) and Hypertension    Subjective          Lucho Blank presents to [facility]  History of Present Illness came here for follow-up on hyperlipidemia and hypertension.  Patient with history of anxiety and depression doing well on 20 mg of Lexapro.  Not taking alprazolam..  Patient is also requesting a refill on epinephrine pain .  History of anaphylaxis reaction from bee sting.  Patient denies any headache, blurring of vision, lightheadedness or dizziness.  She is not checking her blood pressure at home.    Patient has long history of hyperlipidemia denies any body ache, nausea vomiting.  Denies any problem with medication.    Objective   Vital Signs:   /70   Pulse 83   Temp 97.1 °F (36.2 °C)   Ht 185.4 cm (73\")   Wt 98.7 kg (217 lb 11.2 oz)   SpO2 96%   BMI 28.72 kg/m²     Physical Exam  Constitutional:       General: He is not in acute distress.     Appearance: Normal appearance. He is well-developed.   HENT:      Head: Normocephalic and atraumatic.      Right Ear: Tympanic membrane normal.      Left Ear: Tympanic membrane normal.      Mouth/Throat:      Mouth: Mucous membranes are moist.   Eyes:      General:         Right eye: No discharge.         Left eye: No discharge.      Extraocular Movements: Extraocular movements intact.      Pupils: Pupils are equal, round, and reactive to light.   Cardiovascular:      Rate and Rhythm: Normal rate and regular rhythm.      Pulses: Normal pulses.      Heart sounds: Normal heart sounds.   Pulmonary:      Effort: Pulmonary effort is normal.      Breath sounds: Normal breath sounds. No wheezing or rales.   Abdominal:      General: Bowel sounds are normal.      Palpations: Abdomen is soft. There is no mass.      Tenderness: There is no abdominal tenderness.   Musculoskeletal:      Cervical back: Normal range of motion and neck supple.      Right lower leg: No edema.      Left lower leg: No " edema.   Lymphadenopathy:      Cervical: No cervical adenopathy.   Neurological:      General: No focal deficit present.      Mental Status: He is alert and oriented to person, place, and time.        Result Review :                 Assessment and Plan    Diagnoses and all orders for this visit:    1. Solid nodule of lung greater than 8 mm in diameter (Primary)    2. Essential hypertension    3. Mixed hyperlipidemia    Other orders  -     Fluzone High-Dose 65+yrs  -     EPINEPHrine (EpiPen 2-Jose Angel) 0.3 MG/0.3ML solution auto-injector injection; Inject 0.3 mL under the skin into the appropriate area as directed 1 (One) Time for 1 dose.  Dispense: 1 each; Refill: 0      Lucho Blank is a 81-year-old male patient came here for follow-up on  Hypertension, no more dizzy episodes blood pressure controlled current medication he is not checking blood pressure at home.  Continue same  Hyperlipidemia, LDL HDL at goal continue same.  We will recheck his labs on his next visit.  Flu vaccine given to patient in the office today.        Follow Up   No follow-ups on file.  Patient was given instructions and counseling regarding his condition or for health maintenance advice. Please see specific information pulled into the AVS if appropriate.

## 2021-11-03 ENCOUNTER — TELEPHONE (OUTPATIENT)
Dept: FAMILY MEDICINE CLINIC | Facility: CLINIC | Age: 81
End: 2021-11-03

## 2021-11-03 NOTE — TELEPHONE ENCOUNTER
Patient states that when he was in the office the other day, Dr Raymundo was going to check on whether he needed his Pneumonia shot. He was just wondering if she has gotten anywhere with that yet.    Please advise.

## 2021-11-04 NOTE — TELEPHONE ENCOUNTER
Contacted [facility] to see if they have a record of this. They currently do not. Please advise if he should get Pneumovax 23..

## 2021-11-05 NOTE — TELEPHONE ENCOUNTER
HUB STAFF RELAYED INFORMATION TO PATIENT.    PATIENT STATED THAT HE WILL GO TO [facility] TO HAVE THE [id number] DONE.    CALL BACK IF NEEDED:  [phone]

## 2021-11-05 NOTE — TELEPHONE ENCOUNTER
LVM for pt.  Hub please inform patient if call back:    I called [facility] and they show he hasn't had his second [id number] at their facility. If he hasn't gone to anyother pharmacy then he would be considered due. He can make a nurse appointment for this on a day that Dr. Raymundo is here.

## 2021-11-08 ENCOUNTER — TELEPHONE (OUTPATIENT)
Dept: FAMILY MEDICINE CLINIC | Facility: CLINIC | Age: 81
End: 2021-11-08

## 2021-11-08 NOTE — TELEPHONE ENCOUNTER
Caller: Lucho Blank    Relationship: Self    Best call back number: [phone] (H)    What is the best time to reach you: ANY TIME     Who are you requesting to speak with (clinical staff, provider,  specific staff member): CLINICAL STAFF     What was the call regarding: PATIENT SAYS DR CHICAS WAS WANTING HIM TO GET A PNEUMONIA SHOT, AND THE PHARMACY HAS 2 DIFFERENT KINDS TO OFFER-BUT IS OUT OF ONE OF THEM. AND HE NEEDS CLARIFICATION ON WHICH TYPE HE IS NEEDING TO GET.    PLEASE CALL AND ADVISE

## 2022-01-18 ENCOUNTER — HOSPITAL ENCOUNTER (OUTPATIENT)
Dept: CT IMAGING | Facility: HOSPITAL | Age: 82
Discharge: HOME | End: 2022-01-18
Admitting: FAMILY MEDICINE

## 2022-01-18 DIAGNOSIS — R91.1 SOLID NODULE OF LUNG GREATER THAN 8 MM IN DIAMETER: ICD-10-CM

## 2022-01-18 PROCEDURE — 71250 CT THORAX DX C-: CPT

## 2022-02-17 DIAGNOSIS — R53.83 OTHER FATIGUE: ICD-10-CM

## 2022-02-17 DIAGNOSIS — E78.2 MIXED HYPERLIPIDEMIA: Primary | ICD-10-CM

## 2022-02-17 DIAGNOSIS — I10 ESSENTIAL HYPERTENSION: ICD-10-CM

## 2022-02-17 DIAGNOSIS — Z11.59 ENCOUNTER FOR HEPATITIS C SCREENING TEST FOR LOW RISK PATIENT: ICD-10-CM

## 2022-02-18 DIAGNOSIS — I10 ESSENTIAL HYPERTENSION: ICD-10-CM

## 2022-02-18 DIAGNOSIS — Z11.59 ENCOUNTER FOR HEPATITIS C SCREENING TEST FOR LOW RISK PATIENT: ICD-10-CM

## 2022-02-18 DIAGNOSIS — R53.83 OTHER FATIGUE: ICD-10-CM

## 2022-02-18 DIAGNOSIS — E78.2 MIXED HYPERLIPIDEMIA: ICD-10-CM

## 2022-02-19 LAB
ALBUMIN SERPL-MCNC: 4.7 G/DL (ref 3.7–4.7)
ALBUMIN/GLOB SERPL: 2.4 {RATIO} (ref 1.2–2.2)
ALP SERPL-CCNC: 91 IU/L (ref 44–121)
ALT SERPL-CCNC: 32 IU/L (ref 0–44)
AST SERPL-CCNC: 27 IU/L (ref 0–40)
BASOPHILS # BLD AUTO: 0 X10E3/UL (ref 0–0.2)
BASOPHILS NFR BLD AUTO: 1 %
BILIRUB SERPL-MCNC: 0.8 MG/DL (ref 0–1.2)
BUN SERPL-MCNC: 15 MG/DL (ref 8–27)
BUN/CREAT SERPL: 13 (ref 10–24)
CALCIUM SERPL-MCNC: 9.8 MG/DL (ref 8.6–10.2)
CHLORIDE SERPL-SCNC: 106 MMOL/L (ref 96–106)
CHOLEST SERPL-MCNC: 139 MG/DL (ref 100–199)
CO2 SERPL-SCNC: 26 MMOL/L (ref 20–29)
CREAT SERPL-MCNC: 1.17 MG/DL (ref 0.76–1.27)
EOSINOPHIL # BLD AUTO: 0.1 X10E3/UL (ref 0–0.4)
EOSINOPHIL NFR BLD AUTO: 1 %
ERYTHROCYTE [DISTWIDTH] IN BLOOD BY AUTOMATED COUNT: 12.3 % (ref 11.6–15.4)
GLOBULIN SER CALC-MCNC: 2 G/DL (ref 1.5–4.5)
GLUCOSE SERPL-MCNC: 106 MG/DL (ref 65–99)
HCT VFR BLD AUTO: 41.2 % (ref 37.5–51)
HCV AB S/CO SERPL IA: <0.1 S/CO RATIO (ref 0–0.9)
HDLC SERPL-MCNC: 34 MG/DL
HGB BLD-MCNC: 14.1 G/DL (ref 13–17.7)
IMM GRANULOCYTES # BLD AUTO: 0 X10E3/UL (ref 0–0.1)
IMM GRANULOCYTES NFR BLD AUTO: 0 %
LDLC SERPL CALC-MCNC: 70 MG/DL (ref 0–99)
LYMPHOCYTES # BLD AUTO: 2.1 X10E3/UL (ref 0.7–3.1)
LYMPHOCYTES NFR BLD AUTO: 24 %
MCH RBC QN AUTO: 30.8 PG (ref 26.6–33)
MCHC RBC AUTO-ENTMCNC: 34.2 G/DL (ref 31.5–35.7)
MCV RBC AUTO: 90 FL (ref 79–97)
MONOCYTES # BLD AUTO: 0.6 X10E3/UL (ref 0.1–0.9)
MONOCYTES NFR BLD AUTO: 7 %
NEUTROPHILS # BLD AUTO: 5.9 X10E3/UL (ref 1.4–7)
NEUTROPHILS NFR BLD AUTO: 67 %
PLATELET # BLD AUTO: 247 X10E3/UL (ref 150–450)
POTASSIUM SERPL-SCNC: 5.5 MMOL/L (ref 3.5–5.2)
PROT SERPL-MCNC: 6.7 G/DL (ref 6–8.5)
RBC # BLD AUTO: 4.58 X10E6/UL (ref 4.14–5.8)
SODIUM SERPL-SCNC: 144 MMOL/L (ref 134–144)
TRIGL SERPL-MCNC: 207 MG/DL (ref 0–149)
VLDLC SERPL CALC-MCNC: 35 MG/DL (ref 5–40)
WBC # BLD AUTO: 8.9 X10E3/UL (ref 3.4–10.8)

## 2022-02-25 ENCOUNTER — OFFICE VISIT (OUTPATIENT)
Dept: FAMILY MEDICINE CLINIC | Facility: CLINIC | Age: 82
End: 2022-02-25

## 2022-02-25 VITALS
SYSTOLIC BLOOD PRESSURE: 106 MMHG | WEIGHT: 215.1 LBS | HEART RATE: 87 BPM | OXYGEN SATURATION: 96 % | DIASTOLIC BLOOD PRESSURE: 82 MMHG | BODY MASS INDEX: 28.51 KG/M2 | HEIGHT: 73 IN | TEMPERATURE: 97 F

## 2022-02-25 DIAGNOSIS — I10 HYPERTENSION, ESSENTIAL: ICD-10-CM

## 2022-02-25 DIAGNOSIS — E87.5 HYPERKALEMIA: ICD-10-CM

## 2022-02-25 DIAGNOSIS — Z00.00 MEDICARE ANNUAL WELLNESS VISIT, SUBSEQUENT: Primary | ICD-10-CM

## 2022-02-25 DIAGNOSIS — N40.1 BENIGN PROSTATIC HYPERPLASIA WITH URINARY FREQUENCY: ICD-10-CM

## 2022-02-25 DIAGNOSIS — E78.5 HYPERLIPIDEMIA, UNSPECIFIED HYPERLIPIDEMIA TYPE: ICD-10-CM

## 2022-02-25 DIAGNOSIS — R35.0 BENIGN PROSTATIC HYPERPLASIA WITH URINARY FREQUENCY: ICD-10-CM

## 2022-02-25 LAB
BILIRUB BLD-MCNC: NEGATIVE MG/DL
CLARITY, POC: ABNORMAL
COLOR UR: ABNORMAL
EXPIRATION DATE: ABNORMAL
GLUCOSE UR STRIP-MCNC: NEGATIVE MG/DL
KETONES UR QL: NEGATIVE
LEUKOCYTE EST, POC: NEGATIVE
Lab: ABNORMAL
NITRITE UR-MCNC: NEGATIVE MG/ML
PH UR: 7.5 [PH] (ref 5–8)
PROT UR STRIP-MCNC: NEGATIVE MG/DL
RBC # UR STRIP: NEGATIVE /UL
SP GR UR: 1.02 (ref 1–1.03)
UROBILINOGEN UR QL: ABNORMAL

## 2022-02-25 PROCEDURE — 1170F FXNL STATUS ASSESSED: CPT | Performed by: FAMILY MEDICINE

## 2022-02-25 PROCEDURE — 81003 URINALYSIS AUTO W/O SCOPE: CPT | Performed by: FAMILY MEDICINE

## 2022-02-25 PROCEDURE — 1160F RVW MEDS BY RX/DR IN RCRD: CPT | Performed by: FAMILY MEDICINE

## 2022-02-25 PROCEDURE — 99214 OFFICE O/P EST MOD 30 MIN: CPT | Performed by: FAMILY MEDICINE

## 2022-02-25 PROCEDURE — G0439 PPPS, SUBSEQ VISIT: HCPCS | Performed by: FAMILY MEDICINE

## 2022-02-25 NOTE — PROGRESS NOTES
The ABCs of the Annual Wellness Visit  Subsequent Medicare Wellness Visit    Chief Complaint   Patient presents with   • Hypertension     f/u   • Hyperlipidemia   • Medicare Wellness-subsequent      Subjective    History of Present Illness:  Lucho Blank is a 81 y.o. male who presents for a Subsequent Medicare Wellness Visit.  He is also here for follow-up on hypertension and hyperlipidemia.  Patient denies any headache, blurring of vision, lightheadedness or dizziness.  She is not checking her blood pressure at home.  Patient has long history of hyperlipidemia denies any body ache, nausea vomiting.  Denies any problem with medication.    The following portions of the patient's history were reviewed and   updated as appropriate: allergies, current medications, past family history, past medical history, past social history, past surgical history and problem list.    Compared to one year ago, the patient feels his physical   health is better.    Compared to one year ago, the patient feels his mental   health is better.    Recent Hospitalizations:  He was not admitted to the hospital during the last year.       Current Medical Providers:  Patient Care Team:  Arminda Raymundo MD as PCP - General (Family Medicine)  Tashi Alonzo MD as Consulting Physician (Ophthalmology)  Camilla Billy MD as Consulting Physician (Dermatology)  Johnie Chan Jr., MD as Consulting Physician (Urology)  Ellie Beltran (Psychology)    Outpatient Medications Prior to Visit   Medication Sig Dispense Refill   • aspirin 81 MG tablet Take 1 tablet by mouth daily.     • atorvastatin (LIPITOR) 20 MG tablet TAKE 1 TABLET DAILY (NEED APPOINTMENT WITH NEW PRIMARY CARE PHYSICIAN FOR ADDITIONAL REFILLS) 90 tablet 3   • Calcium Carbonate (CALCIUM 500 PO) Take 1 tablet by mouth Daily.     • Coenzyme Q10 200 MG tablet Take 1 tablet by mouth daily.     • cyclobenzaprine (FLEXERIL) 10 MG tablet Take 1 tablet by mouth 3 (Three) Times a Day As Needed for  "Muscle Spasms. 21 tablet 0   • escitalopram (LEXAPRO) 20 MG tablet Take 1 tablet by mouth Daily. 90 tablet 3   • icosapent ethyl (Vascepa) 1 g capsule capsule Take 1 g by mouth 2 (Two) Times a Day With Meals. 360 capsule 3   • loratadine (CLARITIN) 10 MG tablet TAKE 1 TABLET DAILY 90 tablet 3   • Multiple Vitamin (MULTIVITAMIN) capsule Take 1 capsule by mouth daily.     • omeprazole (priLOSEC) 40 MG capsule TAKE 1 CAPSULE DAILY 90 capsule 3   • RESTASIS 0.05 % ophthalmic emulsion INSTILL 1 DROP IN BOTH EYES EVERY 12 HOURS 180 each 3   • valsartan (DIOVAN) 160 MG tablet TAKE 1 TABLET DAILY 90 tablet 3   • Vitamin D, Cholecalciferol, 25 MCG (1000 UT) capsule Take 1 capsule by mouth Daily. 90 capsule 0   • Xanax 2 MG tablet TAKE 1 TABLET THREE TIMES A DAY AS NEEDED FOR ANXIETY 270 tablet 0     No facility-administered medications prior to visit.       No opioid medication identified on active medication list. I have reviewed chart for other potential  high risk medication/s and harmful drug interactions in the elderly.          Aspirin is on active medication list. Aspirin use is indicated based on review of current medical condition/s. Pros and cons of this therapy have been discussed today. Benefits of this medication outweigh potential harm.  Patient has been encouraged to continue taking this medication.  .      Patient Active Problem List   Diagnosis   • Encounter for screening for malignant neoplasm of colon   • Polyuria     Advance Care Planning  Advance Directive is on file.  ACP discussion was held with the patient during this visit. Patient has an advance directive in EMR which is still valid.           Objective    Vitals:    02/25/22 0802   BP: 106/82   Pulse: 87   Temp: 97 °F (36.1 °C)   SpO2: 96%   Weight: 97.6 kg (215 lb 1.6 oz)   Height: 185.4 cm (73\")     BMI Readings from Last 1 Encounters:   02/25/22 28.38 kg/m²   BMI is above normal parameters. Recommendations include: exercise counseling    Does " the patient have evidence of cognitive impairment? No    Physical Exam  Constitutional:       General: He is not in acute distress.     Appearance: Normal appearance. He is well-developed.   HENT:      Head: Normocephalic and atraumatic.      Right Ear: Tympanic membrane normal.      Left Ear: Tympanic membrane normal.      Mouth/Throat:      Mouth: Mucous membranes are moist.   Eyes:      General:         Right eye: No discharge.         Left eye: No discharge.      Extraocular Movements: Extraocular movements intact.      Pupils: Pupils are equal, round, and reactive to light.   Cardiovascular:      Rate and Rhythm: Normal rate and regular rhythm.      Pulses: Normal pulses.      Heart sounds: Normal heart sounds.   Pulmonary:      Effort: Pulmonary effort is normal.      Breath sounds: Normal breath sounds. No wheezing or rales.   Abdominal:      General: Bowel sounds are normal.      Palpations: Abdomen is soft. There is no mass.      Tenderness: There is no abdominal tenderness.   Musculoskeletal:      Cervical back: Normal range of motion and neck supple.      Right lower leg: No edema.      Left lower leg: No edema.   Lymphadenopathy:      Cervical: No cervical adenopathy.   Neurological:      General: No focal deficit present.      Mental Status: He is alert and oriented to person, place, and time.       Lab Results   Component Value Date    CHLPL 139 02/18/2022    TRIG 207 (H) 02/18/2022    HDL 34 (L) 02/18/2022    LDL 70 02/18/2022    VLDL 35 02/18/2022            HEALTH RISK ASSESSMENT    Smoking Status:  Social History     Tobacco Use   Smoking Status Never Smoker   Smokeless Tobacco Never Used     Alcohol Consumption:  Social History     Substance and Sexual Activity   Alcohol Use Yes   • Alcohol/week: 0.0 standard drinks    Comment: occasional     Fall Risk Screen:    [facility] Fall Risk Assessment has not been completed.    Depression Screening:  PHQ-2/PHQ-9 Depression Screening 2/25/2022   Little  interest or pleasure in doing things 0   Feeling down, depressed, or hopeless 0   Trouble falling or staying asleep, or sleeping too much -   Feeling tired or having little energy -   Poor appetite or overeating -   Feeling bad about yourself - or that you are a failure or have let yourself or your family down -   Trouble concentrating on things, such as reading the newspaper or watching television -   Moving or speaking so slowly that other people could have noticed. Or the opposite - being so fidgety or restless that you have been moving around a lot more than usual -   Thoughts that you would be better off dead, or of hurting yourself in some way -   Total Score 0   If you checked off any problems, how difficult have these problems made it for you to do your work, take care of things at home, or get along with other people? -       Health Habits and Functional and Cognitive Screening:  Functional & Cognitive Status 2/25/2022   Do you have difficulty preparing food and eating? No   Do you have difficulty bathing yourself, getting dressed or grooming yourself? No   Do you have difficulty using the toilet? No   Do you have difficulty moving around from place to place? No   Do you have trouble with steps or getting out of a bed or a chair? No   Current Diet Well Balanced Diet   Dental Exam Up to date   Eye Exam Up to date   Exercise (times per week) 5 times per week   Current Exercises Include Walking   Current Exercise Activities Include -   Do you need help using the phone?  No   Are you deaf or do you have serious difficulty hearing?  No   Do you need help with transportation? No   Do you need help shopping? No   Do you need help preparing meals?  No   Do you need help with housework?  No   Do you need help with laundry? No   Do you need help taking your medications? No   Do you need help managing money? No   Do you ever drive or ride in a car without wearing a seat belt? No   Have you felt unusual stress, anger  or loneliness in the last month? No   Who do you live with? Alone   If you need help, do you have trouble finding someone available to you? No   Have you been bothered in the last four weeks by sexual problems? No   Do you have difficulty concentrating, remembering or making decisions? No       Age-appropriate Screening Schedule:  Refer to the list below for future screening recommendations based on patient's age, sex and/or medical conditions. Orders for these recommended tests are listed in the plan section. The patient has been provided with a written plan.    Health Maintenance   Topic Date Due   • LIPID PANEL  02/18/2023   • TDAP/TD VACCINES (2 - Td or Tdap) 02/04/2029   • INFLUENZA VACCINE  Completed   • ZOSTER VACCINE  Completed              Assessment/Plan   CMS Preventative Services Quick Reference  Risk Factors Identified During Encounter  Immunizations Discussed/Encouraged (specific Immunizations; Pneumococcal 23, Shingrix and COVID19  The above risks/problems have been discussed with the patient.  Follow up actions/plans if indicated are seen below in the Assessment/Plan Section.  Pertinent information has been shared with the patient in the After Visit Summary.    Diagnoses and all orders for this visit:    1. Medicare annual wellness visit, subsequent (Primary)    2. Hyperkalemia  -     Potassium; Future    3. Benign prostatic hyperplasia with urinary frequency  -     POC Urinalysis Dipstick, Automated    4. Hypertension, essential    5. Hyperlipidemia, unspecified hyperlipidemia type        Lucho Blank is a 81-year-old male patient came here for  Medicare annual wellness visit, preventive care discussed with patient he is up-to-date with his immunization.  He is doing much better.  Denies any depression.  Denies any SI or HI.  He is walking every day 3/4 mile.      He is also seen today for follow-up on  Hypertension, blood pressure at goal denies any dizzy episodes he is currently on 80 mg of  Diovan.  Labs also reviewed with patient history of hyperkalemia I will repeat potassium again today.  He denies taking any supplement.  Medication list reviewed his potassium is a still high I will decrease his Diovan to 80 and add hydrochlorothiazide.  Hyperlipidemia, as she has not at goal, healthy heart diet recommended to patient.  Urinary frequency, history of BPH not on medication complaining of increased urinary frequency , denies any dysuria.  UA done in the office negative for nitrite or blood.  He has appointment with urologist.  He will follow-up with urology    Follow Up:   No follow-ups on file.     An After Visit Summary and PPPS were made available to the patient.

## 2022-02-26 DIAGNOSIS — E87.5 HYPERKALEMIA: Primary | ICD-10-CM

## 2022-02-26 LAB — POTASSIUM SERPL-SCNC: 5.8 MMOL/L (ref 3.5–5.2)

## 2022-02-26 RX ORDER — HYDROCHLOROTHIAZIDE 25 MG/1
25 TABLET ORAL DAILY
Qty: 90 TABLET | Refills: 0 | Status: SHIPPED | OUTPATIENT
Start: 2022-02-26 | End: 2022-06-06 | Stop reason: SDUPTHER

## 2022-02-26 RX ORDER — VALSARTAN 80 MG/1
80 TABLET ORAL DAILY
Qty: 90 TABLET | Refills: 0 | Status: SHIPPED | OUTPATIENT
Start: 2022-02-26 | End: 2022-06-06 | Stop reason: SDUPTHER

## 2022-03-01 ENCOUNTER — LAB (OUTPATIENT)
Dept: LAB | Facility: HOSPITAL | Age: 82
End: 2022-03-01

## 2022-03-01 DIAGNOSIS — E87.5 HYPERKALEMIA: ICD-10-CM

## 2022-03-01 LAB — POTASSIUM SERPL-SCNC: 4.7 MMOL/L (ref 3.5–5.2)

## 2022-03-01 PROCEDURE — 36415 COLL VENOUS BLD VENIPUNCTURE: CPT

## 2022-03-01 PROCEDURE — 84132 ASSAY OF SERUM POTASSIUM: CPT

## 2022-03-04 ENCOUNTER — TELEPHONE (OUTPATIENT)
Dept: FAMILY MEDICINE CLINIC | Facility: CLINIC | Age: 82
End: 2022-03-04

## 2022-03-11 ENCOUNTER — APPOINTMENT (OUTPATIENT)
Dept: SLEEP MEDICINE | Facility: HOSPITAL | Age: 82
End: 2022-03-11

## 2022-03-18 ENCOUNTER — TELEPHONE (OUTPATIENT)
Dept: SLEEP MEDICINE | Facility: HOSPITAL | Age: 82
End: 2022-03-18

## 2022-03-18 NOTE — TELEPHONE ENCOUNTER
Pt called requesting a new prescription for a mask change. Pt would like to try a nasal mask. Message sent to Camilla.

## 2022-03-21 ENCOUNTER — TELEPHONE (OUTPATIENT)
Dept: SLEEP MEDICINE | Facility: HOSPITAL | Age: 82
End: 2022-03-21

## 2022-03-31 ENCOUNTER — OFFICE VISIT (OUTPATIENT)
Dept: FAMILY MEDICINE CLINIC | Facility: CLINIC | Age: 82
End: 2022-03-31

## 2022-03-31 VITALS
WEIGHT: 212.4 LBS | HEART RATE: 72 BPM | OXYGEN SATURATION: 98 % | TEMPERATURE: 94.9 F | HEIGHT: 73 IN | DIASTOLIC BLOOD PRESSURE: 64 MMHG | BODY MASS INDEX: 28.15 KG/M2 | SYSTOLIC BLOOD PRESSURE: 106 MMHG

## 2022-03-31 DIAGNOSIS — E87.5 HYPERKALEMIA: ICD-10-CM

## 2022-03-31 DIAGNOSIS — I10 HYPERTENSION, ESSENTIAL: Primary | ICD-10-CM

## 2022-03-31 PROCEDURE — 99213 OFFICE O/P EST LOW 20 MIN: CPT | Performed by: FAMILY MEDICINE

## 2022-03-31 NOTE — PROGRESS NOTES
"Chief Complaint  Hypertension    Subjective          Lucho Blank presents to [facility]  History of Present Illness came here for follow-up on hypertension .  He was here last month for the same.  We changed his medication secondary to hypokalemia.  We decreased the Diovan to 80 mg and added hydrochlorothiazide 25 mg a day.  Patient denies any problem with medication.  Checking his blood pressure regularly at home denies any dizziness lightheadedness blurring of vision or headache.    Objective   Vital Signs:   /64   Pulse 72   Temp 94.9 °F (34.9 °C)   Ht 185.4 cm (73\")   Wt 96.3 kg (212 lb 6.4 oz)   SpO2 98%   BMI 28.02 kg/m²            Physical Exam  Constitutional:       General: He is not in acute distress.     Appearance: Normal appearance. He is well-developed.   HENT:      Head: Normocephalic and atraumatic.      Right Ear: Tympanic membrane normal.      Left Ear: Tympanic membrane normal.      Mouth/Throat:      Mouth: Mucous membranes are moist.   Eyes:      General:         Right eye: No discharge.         Left eye: No discharge.      Extraocular Movements: Extraocular movements intact.      Pupils: Pupils are equal, round, and reactive to light.   Cardiovascular:      Rate and Rhythm: Normal rate and regular rhythm.      Pulses: Normal pulses.      Heart sounds: Normal heart sounds.   Pulmonary:      Effort: Pulmonary effort is normal.      Breath sounds: Normal breath sounds. No wheezing or rales.   Abdominal:      General: Bowel sounds are normal.      Palpations: Abdomen is soft. There is no mass.      Tenderness: There is no abdominal tenderness.   Musculoskeletal:      Cervical back: Normal range of motion and neck supple.      Right lower leg: No edema.      Left lower leg: No edema.   Lymphadenopathy:      Cervical: No cervical adenopathy.   Neurological:      General: No focal deficit present.      Mental Status: He is alert and oriented to person, " place, and time.        Result Review :                 Assessment and Plan    Diagnoses and all orders for this visit:    1. Hypertension, essential (Primary)  -     Comprehensive Metabolic Panel    2. Hyperkalemia        Lucho Blank is a 81-year-old male patient came here for follow-up on  Hypertension, blood pressure controlled on current medication.  Continue same.  Hyperkalemia, we will check CMP today.      Follow Up   No follow-ups on file.  Patient was given instructions and counseling regarding his condition or for health maintenance advice. Please see specific information pulled into the AVS if appropriate.

## 2022-04-01 ENCOUNTER — LAB (OUTPATIENT)
Dept: LAB | Facility: HOSPITAL | Age: 82
End: 2022-04-01

## 2022-04-01 LAB
ALBUMIN SERPL-MCNC: 4.3 G/DL (ref 3.5–5.2)
ALBUMIN/GLOB SERPL: 2 G/DL
ALP SERPL-CCNC: 90 U/L (ref 39–117)
ALT SERPL W P-5'-P-CCNC: 22 U/L (ref 1–41)
ANION GAP SERPL CALCULATED.3IONS-SCNC: 10.9 MMOL/L (ref 5–15)
AST SERPL-CCNC: 23 U/L (ref 1–40)
BILIRUB SERPL-MCNC: 0.5 MG/DL (ref 0–1.2)
BUN SERPL-MCNC: 13 MG/DL (ref 8–23)
BUN/CREAT SERPL: 11.3 (ref 7–25)
CALCIUM SPEC-SCNC: 9.5 MG/DL (ref 8.6–10.5)
CHLORIDE SERPL-SCNC: 103 MMOL/L (ref 98–107)
CO2 SERPL-SCNC: 25.1 MMOL/L (ref 22–29)
CREAT SERPL-MCNC: 1.15 MG/DL (ref 0.76–1.27)
EGFRCR SERPLBLD CKD-EPI 2021: 64.7 ML/MIN/1.73
GLOBULIN UR ELPH-MCNC: 2.2 GM/DL
GLUCOSE SERPL-MCNC: 117 MG/DL (ref 65–99)
POTASSIUM SERPL-SCNC: 4.4 MMOL/L (ref 3.5–5.2)
PROT SERPL-MCNC: 6.5 G/DL (ref 6–8.5)
PSA SERPL-MCNC: 5.86 NG/ML (ref 0–4)
SODIUM SERPL-SCNC: 139 MMOL/L (ref 136–145)

## 2022-04-01 PROCEDURE — G0103 PSA SCREENING: HCPCS | Performed by: FAMILY MEDICINE

## 2022-04-01 PROCEDURE — 80053 COMPREHEN METABOLIC PANEL: CPT | Performed by: FAMILY MEDICINE

## 2022-04-01 PROCEDURE — 36415 COLL VENOUS BLD VENIPUNCTURE: CPT | Performed by: FAMILY MEDICINE

## 2022-04-18 ENCOUNTER — TELEPHONE (OUTPATIENT)
Dept: FAMILY MEDICINE CLINIC | Facility: CLINIC | Age: 82
End: 2022-04-18

## 2022-04-18 NOTE — TELEPHONE ENCOUNTER
Left voicemail for patient regarding these results/recommendations. OK per HIPAA Open telephone encounter left for reread by hub.    Hub please inform patient if call back:    Nelda!    Dr. Raymundo has received and reviewed your lab results. She says the following:      PSA slightly elevated.  I know he has a follow-up appointment with urology.        If you have any questions, feel free to reach back out to us!    Have a Good Day!  DARYN Hicks

## 2022-04-25 ENCOUNTER — OFFICE VISIT (OUTPATIENT)
Dept: SLEEP MEDICINE | Facility: HOSPITAL | Age: 82
End: 2022-04-25

## 2022-04-25 VITALS
OXYGEN SATURATION: 96 % | HEART RATE: 83 BPM | WEIGHT: 211.4 LBS | BODY MASS INDEX: 28.02 KG/M2 | SYSTOLIC BLOOD PRESSURE: 106 MMHG | HEIGHT: 73 IN | DIASTOLIC BLOOD PRESSURE: 60 MMHG

## 2022-04-25 DIAGNOSIS — E66.3 OVERWEIGHT (BMI 25.0-29.9): ICD-10-CM

## 2022-04-25 DIAGNOSIS — G47.33 OBSTRUCTIVE SLEEP APNEA: Primary | ICD-10-CM

## 2022-04-25 PROCEDURE — G0463 HOSPITAL OUTPT CLINIC VISIT: HCPCS

## 2022-04-25 NOTE — PROGRESS NOTES
[facility]  Telephone: [phone] / Fax: [phone] [place]  Telephone: [phone] / Fax: [phone] Carmela Collazo    PCP: Arminda Raymundo MD    Reason for visit: JHON f/u    Lucho Blank is a 81 y.o.male  was last seen at [facility] sleep lab 1 year ago. Changed masks from full face to nasal pillow.  Gap in usage on download mid-March -April due to poorly fitting mask that has since improved.  His sleep schedule is 9:30 pm and awake time 6:30am. ESS is 8. He is happy with his DME- [facility].  He lost a lot of weight since seeing us. He wakes up feeling rested. No residual snoring or symptoms on CPAP.    SH- no tobacco, drinks 1 alc per week, 1 soda/caffeine per day    ROS negative    DME [facility]    Current Medications:    Current Outpatient Medications:   •  aspirin 81 MG tablet, Take 1 tablet by mouth daily., Disp: , Rfl:   •  atorvastatin (LIPITOR) 20 MG tablet, TAKE 1 TABLET DAILY (NEED APPOINTMENT WITH NEW PRIMARY CARE PHYSICIAN FOR ADDITIONAL REFILLS), Disp: 90 tablet, Rfl: 3  •  Calcium Carbonate (CALCIUM 500 PO), Take 1 tablet by mouth Daily., Disp: , Rfl:   •  Coenzyme Q10 200 MG tablet, Take 1 tablet by mouth daily., Disp: , Rfl:   •  cyclobenzaprine (FLEXERIL) 10 MG tablet, Take 1 tablet by mouth 3 (Three) Times a Day As Needed for Muscle Spasms., Disp: 21 tablet, Rfl: 0  •  escitalopram (LEXAPRO) 20 MG tablet, Take 1 tablet by mouth Daily., Disp: 90 tablet, Rfl: 3  •  hydroCHLOROthiazide (HYDRODIURIL) 25 MG tablet, Take 1 tablet by mouth Daily., Disp: 90 tablet, Rfl: 0  •  icosapent ethyl (Vascepa) 1 g capsule capsule, Take 1 g by mouth 2 (Two) Times a Day With Meals., Disp: 360 capsule, Rfl: 3  •  loratadine (CLARITIN) 10 MG tablet, TAKE 1 TABLET DAILY, Disp: 90 tablet, Rfl: 3  •  Multiple Vitamin (MULTIVITAMIN) capsule, Take 1 capsule by mouth daily., Disp: , Rfl:   •  omeprazole (priLOSEC) 40 MG capsule, TAKE 1 CAPSULE DAILY, Disp: 90 capsule, Rfl: 3  •  RESTASIS 0.05 % ophthalmic  "emulsion, INSTILL 1 DROP IN BOTH EYES EVERY 12 HOURS, Disp: 180 each, Rfl: 3  •  valsartan (Diovan) 80 MG tablet, Take 1 tablet by mouth Daily., Disp: 90 tablet, Rfl: 0  •  Vitamin D, Cholecalciferol, 25 MCG (1000 UT) capsule, Take 1 capsule by mouth Daily., Disp: 90 capsule, Rfl: 0  •  Xanax 2 MG tablet, TAKE 1 TABLET THREE TIMES A DAY AS NEEDED FOR ANXIETY, Disp: 270 tablet, Rfl: 0   also entered in Sleep Questionnaire    Patient  has a past medical history of Allergic, Anxiety, Anxiety disorder, Cancer (HCC), Cervical pain, Chest pain, CPAP (continuous positive airway pressure) dependence, GERD (gastroesophageal reflux disease), History of EKG (03/20/2014), Hyperlipidemia, Hypertension, Increased serum lipids, Insomnia, Left ear pain, Right knee pain, Sleep apnea, and Sleep disturbance.    I have reviewed the Past Medical History, Past Surgical History, Social History and Family History.    Vital Signs /60   Pulse 83   Ht 185.4 cm (73\")   Wt 95.9 kg (211 lb 6.4 oz)   SpO2 96%   BMI 27.89 kg/m²  Body mass index is 27.89 kg/m².    General Alert and oriented. No acute distress noted   Pharynx/Throat Class III  Mallampati airway, large tongue, no evidence of redundant lateral pharyngeal tissue. No oral lesions. No thrush. Moist mucous membranes.   Head Normocephalic. Symmetrical. Atraumatic.    Nose No septal deviation. No drainage   Chest Wall Normal shape. Symmetric expansion with respiration. No tenderness.   Neck Trachea midline, no thyromegaly or adenopathy    Lungs Clear to auscultation bilaterally. No wheezes. No rhonchi. No rales. Respirations regular, even and unlabored.   Heart Regular rhythm and normal rate. Normal S1 and S2. No murmur   Abdomen Soft, non-tender and non-distended. Normal bowel sounds. No masses.   Extremities Moves all extremities well. No edema   Psychiatric Normal mood and affect.     Testing:  · Download 1/21/22-4/20/22 67% use with average nightly use of 8 hours and 41 min " on auto CPAP 10-20cm H2O, P 95 16cm H2O, AHI 2.7, leak of 2.3 L.min.    Study:  · 6/10/18  Overnight split polysomnogram study.  Diagnostic from 10:27 PM to 12:59 AM.  Sleep efficiency 51.9% which is poor.  Total sleep time 1.32 hours.  There was absence of slow-wave and REM sleep on the diagnostic study.  AHI index 19.7 with RDI 30.3.  Patient slept in supine position for the entire diagnostic portion.  REM stage sleep was not seen and therefore severity underestimated.  Oxygen saturation remained above 88%.  Arousal index 36 events an hour.  PLM index is not increased.  Patient had 36.66% time snoring.     Titration portion from 12:59 AM to 5:30 AM.  Sleep efficiency was again very poor at 56.9%.  Some improvement in slow-wave and REM sleep to 7% each.  AHI index was improved.  There are indicators for severe in rem stage sleep.  CPAP increased from 5-10 cm water pressure stopped maximum sleep noted at CPAP pressure 10 cm.  There was some stage REM sleep at 8 cm water pressure and AHI index is increased because of this.  Patient may require pressures slightly higher than 10 cm.    Impression:  1. Obstructive sleep apnea    2. Overweight (BMI 25.0-29.9)          Plan:  Lucho is doing great on the CPAP now that he received updated mask. I reviewed original download report and sleep study report with pt. He uses the machine and benefits. He will continue nasal mask. AHI on treatment is down to 2.7 from pre treatment AHI of 19 and RDI 30. He was congratulated on weight loss he was able to achieve.    Follow up with Dr. Raymundo in one year    Thank you for allowing me to participate in your patient's care.      THERESA Chacon  [facility]  Phone: [phone]      Part of this note may be an electronic transcription/translation of spoken language to printed text using the Dragon Dictation System.

## 2022-05-25 NOTE — TELEPHONE ENCOUNTER
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

## 2022-06-06 RX ORDER — HYDROCHLOROTHIAZIDE 25 MG/1
25 TABLET ORAL DAILY
Qty: 90 TABLET | Refills: 0 | Status: SHIPPED | OUTPATIENT
Start: 2022-06-06 | End: 2023-01-05

## 2022-06-06 RX ORDER — VALSARTAN 80 MG/1
80 TABLET ORAL DAILY
Qty: 90 TABLET | Refills: 0 | Status: SHIPPED | OUTPATIENT
Start: 2022-06-06

## 2022-06-06 NOTE — TELEPHONE ENCOUNTER
Caller: Lucho Blank    Relationship: Self    Best call back number: [phone]    Requested Prescriptions:   Requested Prescriptions     Pending Prescriptions Disp Refills   • hydroCHLOROthiazide (HYDRODIURIL) 25 MG tablet 90 tablet 0     Sig: Take 1 tablet by mouth Daily.   • valsartan (Diovan) 80 MG tablet 90 tablet 0     Sig: Take 1 tablet by mouth Daily.        Pharmacy where request should be sent: [id number] [place] [address] AT [facility] & FACTORY [facility] [phone] [facility] [phone]      Additional details provided by patient: PATIENT STATES HE NEEDS TO KNOW IF HE NEEDS TO STAY ON BOTH OF THESE MEDICATIONS, AND IF SO HE NEEDS A REFILL ASAP BECAUSE HE IS LEAVING [facility] ON 06.09.22 HE IS OUT OF BOTH MEDICATIONS. PLEASE CALL AND ADVISE    Does the patient have less than a 3 day supply:  [x] Yes  [] No    Joshua Skinner Rep   06/06/22 08:40 EDT

## 2022-06-06 NOTE — TELEPHONE ENCOUNTER
Rx Refill Note  Requested Prescriptions     Pending Prescriptions Disp Refills   • hydroCHLOROthiazide (HYDRODIURIL) 25 MG tablet 90 tablet 0     Sig: Take 1 tablet by mouth Daily.   • valsartan (Diovan) 80 MG tablet 90 tablet 0     Sig: Take 1 tablet by mouth Daily.      Last office visit with prescribing clinician: 3/31/2022      Next office visit with prescribing clinician: 8/25/2022       {TIP  Please add Last Relevant Lab Date if appropriate:4/1/2022    STEVO Ortiz  06/06/22, 09:48 EDT

## 2022-06-24 NOTE — TELEPHONE ENCOUNTER
Rx Refill Note  Requested Prescriptions     Pending Prescriptions Disp Refills   • cycloSPORINE (Restasis) 0.05 % ophthalmic emulsion 180 each 3     Sig: Administer 1 drop to both eyes Every 12 (Twelve) Hours.      Last office visit with prescribing clinician: 3/31/2022      Next office visit with prescribing clinician: 8/25/2022            Fabiola Ayers MA  06/24/22, 15:37 EDT

## 2022-06-27 RX ORDER — ATORVASTATIN CALCIUM 20 MG/1
TABLET, FILM COATED ORAL
Qty: 90 TABLET | Refills: 3 | Status: SHIPPED | OUTPATIENT
Start: 2022-06-27

## 2022-06-27 RX ORDER — CYCLOSPORINE 0.5 MG/ML
1 EMULSION OPHTHALMIC EVERY 12 HOURS
Qty: 180 EACH | Refills: 3 | Status: SHIPPED | OUTPATIENT
Start: 2022-06-27

## 2022-08-25 ENCOUNTER — OFFICE VISIT (OUTPATIENT)
Dept: FAMILY MEDICINE CLINIC | Facility: CLINIC | Age: 82
End: 2022-08-25

## 2022-08-25 VITALS
TEMPERATURE: 97.2 F | DIASTOLIC BLOOD PRESSURE: 68 MMHG | HEIGHT: 73 IN | BODY MASS INDEX: 28.49 KG/M2 | HEART RATE: 75 BPM | WEIGHT: 215 LBS | OXYGEN SATURATION: 98 % | SYSTOLIC BLOOD PRESSURE: 116 MMHG

## 2022-08-25 DIAGNOSIS — E78.2 MIXED HYPERLIPIDEMIA: ICD-10-CM

## 2022-08-25 DIAGNOSIS — I10 HYPERTENSION, ESSENTIAL: Primary | ICD-10-CM

## 2022-08-25 DIAGNOSIS — R53.83 FATIGUE, UNSPECIFIED TYPE: ICD-10-CM

## 2022-08-25 DIAGNOSIS — R73.9 HYPERGLYCEMIA: ICD-10-CM

## 2022-08-25 DIAGNOSIS — Z12.5 SCREENING FOR PROSTATE CANCER: ICD-10-CM

## 2022-08-25 PROCEDURE — 99213 OFFICE O/P EST LOW 20 MIN: CPT | Performed by: FAMILY MEDICINE

## 2022-08-25 NOTE — PROGRESS NOTES
"Chief Complaint  Follow-up, Hypertension, and Hyperlipidemia    Subjective        Lucho Blank presents to [facility]  History of Present Illness     Lucho Blank is a 82-year-old male who presents today for a follow-up on hypertension and hyperlipidemia    The patient states that he is well. He reports that his blood pressure is within normal range, and he denies any symptoms or problems. He denies any feelings of depression.  Denies any headache blurring of vision or chest pain  Patient has long history of hyperlipidemia denies any body ache, nausea vomiting.  Denies any problem with medication.  His previous lab results show that his potassium was elevated. He states that he is trying to adjust his eating habits, and he does not eat bananas.    His blood glucose level on his previous labs was 106 mg/dL. He notes that he is not fasting today.     He is no longer taking Restasis because he is unable to get the prescription refilled. He notes that his ophthalmologist was suppose to refill the prescription. He reports that he is using over-the-counter eyedrops. He adds that his bilateral eyes are not dry but watery. He denies any irritation. He states that he was wearing the wrong CPAP mask, and the air was causing his watery eyes.       Objective   Vital Signs:  /68   Pulse 75   Temp 97.2 °F (36.2 °C)   Ht 185.4 cm (73\")   Wt 97.5 kg (215 lb)   SpO2 98%   BMI 28.37 kg/m²   Estimated body mass index is 28.37 kg/m² as calculated from the following:    Height as of this encounter: 185.4 cm (73\").    Weight as of this encounter: 97.5 kg (215 lb).          Physical Exam  Constitutional:       General: He is not in acute distress.     Appearance: Normal appearance. He is well-developed.   HENT:      Head: Normocephalic and atraumatic.      Right Ear: Tympanic membrane normal.      Left Ear: Tympanic membrane normal.      Mouth/Throat:      Mouth: Mucous membranes are moist. "   Eyes:      General:         Right eye: No discharge.         Left eye: No discharge.      Extraocular Movements: Extraocular movements intact.      Pupils: Pupils are equal, round, and reactive to light.   Cardiovascular:      Rate and Rhythm: Normal rate and regular rhythm.      Heart sounds: Normal heart sounds.   Pulmonary:      Effort: Pulmonary effort is normal.      Breath sounds: Normal breath sounds. No wheezing or rales.   Abdominal:      General: Bowel sounds are normal.      Palpations: Abdomen is soft. There is no mass.      Tenderness: There is no abdominal tenderness.   Musculoskeletal:      Cervical back: Normal range of motion and neck supple.      Right lower leg: No edema.      Left lower leg: No edema.   Lymphadenopathy:      Cervical: No cervical adenopathy.   Neurological:      General: No focal deficit present.      Mental Status: He is alert and oriented to person, place, and time.          Result Review :                    Assessment and Plan   Diagnoses and all orders for this visit:    1. Hypertension, essential (Primary)     -     Comprehensive Metabolic Panel  -     Comprehensive Metabolic Panel; Future  -     Lipid Panel; Future    2. Mixed hyperlipidemia  -     The patient is taking atorvastatin 20 mg, and he will continue his current regime.   -     Lipid Panel; Future       Hyperglycemia  -     Hemoglobin A1c    . Fatigue, unspecified type  -     CBC & Differential; Future  -     TSH+Free T4; Future     Screening for prostate cancer  -     PSA Screen; Future     Mixed hyperlipidemia  -     The patient is taking atorvastatin 20 mg, and he will continue his current regime.   -     Lipid Panel; Future      Lucho Blank is a 82-year-old male patient seen today for follow-up on  Hypertension, blood pressure at goal continue same we will check CMP today  Hyperlipidemia, on 20 mg of atorvastatin denies any problem continue same    He will follow up in 6-months. He is advised to do his  labs before his appointment.  Lab orders placed             Follow Up   No follow-ups on file.    Patient was given instructions and counseling regarding his condition or for health maintenance advice. Please see specific information pulled into the AVS if appropriate.         Transcribed from ambient dictation for Arminda Raymundo MD by Stephy Ascencio.  08/25/22   12:21 EDT    Patient verbalized consent to the visit recording.

## 2022-08-26 LAB
ALBUMIN SERPL-MCNC: 4.7 G/DL (ref 3.7–4.7)
ALBUMIN/GLOB SERPL: 2.4 {RATIO} (ref 1.2–2.2)
ALP SERPL-CCNC: 89 IU/L (ref 44–121)
ALT SERPL-CCNC: 29 IU/L (ref 0–44)
AST SERPL-CCNC: 25 IU/L (ref 0–40)
BILIRUB SERPL-MCNC: 0.7 MG/DL (ref 0–1.2)
BUN SERPL-MCNC: 16 MG/DL (ref 8–27)
BUN/CREAT SERPL: 13 (ref 10–24)
CALCIUM SERPL-MCNC: 9.7 MG/DL (ref 8.6–10.2)
CHLORIDE SERPL-SCNC: 102 MMOL/L (ref 96–106)
CO2 SERPL-SCNC: 26 MMOL/L (ref 20–29)
CREAT SERPL-MCNC: 1.23 MG/DL (ref 0.76–1.27)
EGFRCR-CYS SERPLBLD CKD-EPI 2021: 59 ML/MIN/1.73
GLOBULIN SER CALC-MCNC: 2 G/DL (ref 1.5–4.5)
GLUCOSE SERPL-MCNC: 87 MG/DL (ref 65–99)
HBA1C MFR BLD: 5.7 % (ref 4.8–5.6)
POTASSIUM SERPL-SCNC: 4.9 MMOL/L (ref 3.5–5.2)
PROT SERPL-MCNC: 6.7 G/DL (ref 6–8.5)
SODIUM SERPL-SCNC: 142 MMOL/L (ref 134–144)

## 2022-09-19 RX ORDER — OMEPRAZOLE 40 MG/1
CAPSULE, DELAYED RELEASE ORAL
Qty: 90 CAPSULE | Refills: 3 | Status: SHIPPED | OUTPATIENT
Start: 2022-09-19

## 2022-09-20 ENCOUNTER — OFFICE VISIT (OUTPATIENT)
Dept: NEUROLOGY | Facility: CLINIC | Age: 82
End: 2022-09-20

## 2022-09-20 VITALS
HEART RATE: 65 BPM | OXYGEN SATURATION: 98 % | HEIGHT: 73 IN | DIASTOLIC BLOOD PRESSURE: 64 MMHG | SYSTOLIC BLOOD PRESSURE: 112 MMHG | WEIGHT: 207 LBS | BODY MASS INDEX: 27.43 KG/M2

## 2022-09-20 DIAGNOSIS — F33.0 MILD RECURRENT MAJOR DEPRESSION: ICD-10-CM

## 2022-09-20 DIAGNOSIS — R41.3 MEMORY LOSS: Primary | ICD-10-CM

## 2022-09-20 PROCEDURE — 99213 OFFICE O/P EST LOW 20 MIN: CPT | Performed by: NURSE PRACTITIONER

## 2022-09-20 RX ORDER — ESCITALOPRAM OXALATE 20 MG/1
20 TABLET ORAL DAILY
Qty: 90 TABLET | Refills: 3 | Status: SHIPPED | OUTPATIENT
Start: 2022-09-20

## 2022-09-20 NOTE — PROGRESS NOTES
Subjective:     Patient ID: Lucho Blank is a 82 y.o. male presenting for annual follow-up for memory loss and depression.  When I saw him initially I referred him for neuropsychology testing which revealed major depressive disorder and some mild deficits with immediate and delayed memory.  He was started on Lexapro 20 mg daily and this has helped tremendously.  He is doing much better.  His wife had passed away and he was struggling with that.  Since I saw him last he has moved to a different assisted living and says that he is surrounded by much younger people who he feels he can relate to ended this is helping quite a bit as well.  He also lives within walking distance of his brother which he enjoys.  His granddaughter is a senior at [employer] playing soccer and he enjoys going to her games.    History of Present Illness  The following portions of the patient's history were reviewed and updated as appropriate: allergies, current medications, past family history, past medical history, past social history, past surgical history and problem list.    Review of Systems   Endocrine: Negative for cold intolerance, heat intolerance and polydipsia.   Genitourinary: Negative for decreased urine volume, difficulty urinating and urgency.   Musculoskeletal: Negative for back pain, neck pain and neck stiffness.   Skin: Negative for color change, rash and wound.   Allergic/Immunologic: Negative for environmental allergies, food allergies and immunocompromised state.   Neurological: Negative for dizziness, tremors, seizures, syncope, facial asymmetry, speech difficulty, weakness, light-headedness, numbness and headaches.   Hematological: Negative for adenopathy. Does not bruise/bleed easily.   Psychiatric/Behavioral: Negative for confusion and sleep disturbance. The patient is not nervous/anxious.       I have reviewed and confirmed the accuracy of the patient's history: Chief complaint, HPI, ROS, Subjective and Past Family Social  History as entered by the MA/LPN/RN. Arun Batista, APRN 09/20/22       Objective:    Neurologic Exam  General: Well nourished, well developed, and in no acute distress.  HEENT: Normocephalic/atraumatic. Mucous membranes moist. Sclerae anicteric.   Heart: Regular rate and rhythm. No murmurs, rubs or gallops.  Lungs: Clear to auscultation bilaterally.  Skin: No notable rashes or lesions on the visible surfaces.   Extremities: No clubbing, cyanosis or significant edema.   Psychiatric: Pleasant, cooperative, and appropriate.   Neurologic Exam:  Mental Status:  Alert and oriented. Speech is fluent. Comprehension is intact. Scored a 29/30 on the MMSE today. He missed 1 word on delayed recall.   Cranial Nerves II-XII: Pupils equal, round, reactive to light. Extraocular movements are full and conjugate in all directions. Pursuit movements do not provoke any apparent dizziness or discomfort.  No nystagmus noted.  Hearing is intact to voice. Facial strength and sensation are preserved and symmetric. Tongue and palate midline. Voice non-hoarse, non-dysarthric.   Motor: Normal bulk and tone of bilateral upper and lower extremities. Strength is 5/5 in all 4 extremities both proximally and distally. There are no abnormal or involuntary movements noted.  Sensation: Intact to light touch throughout. Romberg was negative with no significant sway.   Coordination: Fully intact. Finger-to-nose performed accurately bilaterally.  Reflexes: The deep tendon reflexes are 2+/4 in bilateral biceps, brachioradialis, triceps, patella, and Achilles.  No pathologic reflexes were noted.  Gait: Normal. No ataxia or apraxia.         Physical Exam    Assessment/Plan:     Diagnoses and all orders for this visit:    1. Memory loss (Primary)    2. Mild recurrent major depression (HCC)    Other orders  -     escitalopram (LEXAPRO) 20 MG tablet; Take 1 tablet by mouth Daily.  Dispense: 90 tablet; Refill: 3        He is doing very well.  He keeps  himself busy with various activities.  He also is enjoying his new living arrangements much better as there are much younger people that he feels he is more similar to around him.  He feels the Lexapro continues to work very well for him.  He will continue this, 20 mg daily.  He will follow-up annually, sooner if needed.

## 2022-12-23 ENCOUNTER — OFFICE VISIT (OUTPATIENT)
Dept: FAMILY MEDICINE CLINIC | Facility: CLINIC | Age: 82
End: 2022-12-23

## 2022-12-23 ENCOUNTER — TELEPHONE (OUTPATIENT)
Dept: FAMILY MEDICINE CLINIC | Facility: CLINIC | Age: 82
End: 2022-12-23

## 2022-12-23 VITALS — DIASTOLIC BLOOD PRESSURE: 70 MMHG | SYSTOLIC BLOOD PRESSURE: 120 MMHG

## 2022-12-23 DIAGNOSIS — R51.9 ACUTE INTRACTABLE HEADACHE, UNSPECIFIED HEADACHE TYPE: Primary | ICD-10-CM

## 2022-12-23 DIAGNOSIS — I10 BENIGN ESSENTIAL HTN: ICD-10-CM

## 2022-12-23 DIAGNOSIS — K59.00 CONSTIPATION, UNSPECIFIED CONSTIPATION TYPE: ICD-10-CM

## 2022-12-23 PROCEDURE — 99213 OFFICE O/P EST LOW 20 MIN: CPT | Performed by: FAMILY MEDICINE

## 2022-12-23 NOTE — PROGRESS NOTES
"Chief Complaint  Headache and Nausea    Subjective         History of Present Illness Lucho Blank is a 82-year-old male patient, telephone visit scheduled with patient with complaints of headache and nausea for few days.  Patient was complaining of headache and nausea for few days.  He also went to urgent care yesterday for the same.  After coming from urgent care he had large bowel movement.  As per patient his symptoms has improved and he denies any morning headache nausea.  Denies any abdominal pain denies any urinary problem or diarrhea    You have chosen to receive care through a telephone visit. Do you consent to use a telephone visit for your medical care today? Yes      Objective   Vital Signs:  /70   Estimated body mass index is 27.05 kg/m² as calculated from the following:    Height as of 12/21/22: 185.4 cm (73\").    Weight as of 12/21/22: 93 kg (205 lb).          Physical Exam   Result Review :                Assessment and Plan   Diagnoses and all orders for this visit:    1. Acute intractable headache, unspecified headache type (Primary)    2. Constipation, unspecified constipation type    3. Benign essential HTN      Lucho Blank is a 82-year-old male patient televisit done with patient for follow-up on headache nausea.  His symptoms improved after having a bowel movement.  I advised him to start taking Metamucil every day.  Urgent care records reviewed he has elevated blood pressure at urgent care but denies any elevated blood pressure at home.  His blood pressure was normal today at home.  I advised him to keep a blood pressure log and message me through [employer].  Continue same       I spent 15 minutes caring for Lucho on this date of service. This time includes time spent by me in the following activities:obtaining and/or reviewing a separately obtained history and documenting information in the medical record  Follow Up   No follow-ups on file.  Patient was given instructions and " counseling regarding his condition or for health maintenance advice. Please see specific information pulled into the AVS if appropriate.

## 2022-12-23 NOTE — TELEPHONE ENCOUNTER
Caller: Lucho Blank    Relationship to patient: Self     Best call back number: [phone]    Patient is needing: PATIENT IS REQUESTING A CALL BACK FOR HIS TELEPHONE APPOINTMENT.

## 2022-12-27 ENCOUNTER — TELEPHONE (OUTPATIENT)
Dept: FAMILY MEDICINE CLINIC | Facility: CLINIC | Age: 82
End: 2022-12-27

## 2022-12-27 ENCOUNTER — APPOINTMENT (OUTPATIENT)
Dept: GENERAL RADIOLOGY | Facility: HOSPITAL | Age: 82
End: 2022-12-27

## 2022-12-27 ENCOUNTER — APPOINTMENT (OUTPATIENT)
Dept: MRI IMAGING | Facility: HOSPITAL | Age: 82
End: 2022-12-27

## 2022-12-27 ENCOUNTER — HOSPITAL ENCOUNTER (EMERGENCY)
Facility: HOSPITAL | Age: 82
Discharge: HOME | End: 2022-12-27
Attending: EMERGENCY MEDICINE | Admitting: EMERGENCY MEDICINE

## 2022-12-27 VITALS
HEIGHT: 73 IN | RESPIRATION RATE: 18 BRPM | OXYGEN SATURATION: 98 % | WEIGHT: 205 LBS | SYSTOLIC BLOOD PRESSURE: 150 MMHG | BODY MASS INDEX: 27.17 KG/M2 | DIASTOLIC BLOOD PRESSURE: 86 MMHG | HEART RATE: 75 BPM | TEMPERATURE: 97.7 F

## 2022-12-27 DIAGNOSIS — S39.012A LUMBAR STRAIN, INITIAL ENCOUNTER: ICD-10-CM

## 2022-12-27 DIAGNOSIS — R39.15 URINARY URGENCY: Primary | ICD-10-CM

## 2022-12-27 DIAGNOSIS — S20.211A RIB CONTUSION, RIGHT, INITIAL ENCOUNTER: ICD-10-CM

## 2022-12-27 LAB
ALBUMIN SERPL-MCNC: 4.9 G/DL (ref 3.5–5.2)
ALBUMIN/GLOB SERPL: 2.7 G/DL
ALP SERPL-CCNC: 96 U/L (ref 39–117)
ALT SERPL W P-5'-P-CCNC: 29 U/L (ref 1–41)
ANION GAP SERPL CALCULATED.3IONS-SCNC: 8.1 MMOL/L (ref 5–15)
APTT PPP: 25.3 SECONDS (ref 22.7–35.4)
AST SERPL-CCNC: 28 U/L (ref 1–40)
BASOPHILS # BLD AUTO: 0.03 10*3/MM3 (ref 0–0.2)
BASOPHILS NFR BLD AUTO: 0.3 % (ref 0–1.5)
BILIRUB SERPL-MCNC: 0.7 MG/DL (ref 0–1.2)
BILIRUB UR QL STRIP: NEGATIVE
BUN SERPL-MCNC: 11 MG/DL (ref 8–23)
BUN/CREAT SERPL: 10.8 (ref 7–25)
CALCIUM SPEC-SCNC: 9.7 MG/DL (ref 8.6–10.5)
CHLORIDE SERPL-SCNC: 102 MMOL/L (ref 98–107)
CLARITY UR: CLEAR
CO2 SERPL-SCNC: 29.9 MMOL/L (ref 22–29)
COLOR UR: YELLOW
CREAT SERPL-MCNC: 1.02 MG/DL (ref 0.76–1.27)
DEPRECATED RDW RBC AUTO: 40.5 FL (ref 37–54)
EGFRCR SERPLBLD CKD-EPI 2021: 74.3 ML/MIN/1.73
EOSINOPHIL # BLD AUTO: 0.14 10*3/MM3 (ref 0–0.4)
EOSINOPHIL NFR BLD AUTO: 1.6 % (ref 0.3–6.2)
ERYTHROCYTE [DISTWIDTH] IN BLOOD BY AUTOMATED COUNT: 12.3 % (ref 12.3–15.4)
GLOBULIN UR ELPH-MCNC: 1.8 GM/DL
GLUCOSE SERPL-MCNC: 110 MG/DL (ref 65–99)
GLUCOSE UR STRIP-MCNC: NEGATIVE MG/DL
HCT VFR BLD AUTO: 42.5 % (ref 37.5–51)
HGB BLD-MCNC: 14.3 G/DL (ref 13–17.7)
HGB UR QL STRIP.AUTO: NEGATIVE
IMM GRANULOCYTES # BLD AUTO: 0.03 10*3/MM3 (ref 0–0.05)
IMM GRANULOCYTES NFR BLD AUTO: 0.3 % (ref 0–0.5)
INR PPP: 1.01 (ref 0.9–1.1)
KETONES UR QL STRIP: NEGATIVE
LEUKOCYTE ESTERASE UR QL STRIP.AUTO: NEGATIVE
LYMPHOCYTES # BLD AUTO: 2.02 10*3/MM3 (ref 0.7–3.1)
LYMPHOCYTES NFR BLD AUTO: 22.4 % (ref 19.6–45.3)
MCH RBC QN AUTO: 29.7 PG (ref 26.6–33)
MCHC RBC AUTO-ENTMCNC: 33.6 G/DL (ref 31.5–35.7)
MCV RBC AUTO: 88.4 FL (ref 79–97)
MONOCYTES # BLD AUTO: 0.78 10*3/MM3 (ref 0.1–0.9)
MONOCYTES NFR BLD AUTO: 8.7 % (ref 5–12)
NEUTROPHILS NFR BLD AUTO: 6 10*3/MM3 (ref 1.7–7)
NEUTROPHILS NFR BLD AUTO: 66.7 % (ref 42.7–76)
NITRITE UR QL STRIP: NEGATIVE
NRBC BLD AUTO-RTO: 0 /100 WBC (ref 0–0.2)
PH UR STRIP.AUTO: 8 [PH] (ref 5–8)
PLATELET # BLD AUTO: 244 10*3/MM3 (ref 140–450)
PMV BLD AUTO: 9.6 FL (ref 6–12)
POTASSIUM SERPL-SCNC: 4.4 MMOL/L (ref 3.5–5.2)
PROT SERPL-MCNC: 6.7 G/DL (ref 6–8.5)
PROT UR QL STRIP: NEGATIVE
PROTHROMBIN TIME: 13.5 SECONDS (ref 11.7–14.2)
RBC # BLD AUTO: 4.81 10*6/MM3 (ref 4.14–5.8)
SODIUM SERPL-SCNC: 140 MMOL/L (ref 136–145)
SP GR UR STRIP: 1.01 (ref 1–1.03)
UROBILINOGEN UR QL STRIP: NORMAL
WBC NRBC COR # BLD: 9 10*3/MM3 (ref 3.4–10.8)

## 2022-12-27 PROCEDURE — 51798 US URINE CAPACITY MEASURE: CPT

## 2022-12-27 PROCEDURE — A9577 INJ MULTIHANCE: HCPCS | Performed by: EMERGENCY MEDICINE

## 2022-12-27 PROCEDURE — 99284 EMERGENCY DEPT VISIT MOD MDM: CPT

## 2022-12-27 PROCEDURE — 85025 COMPLETE CBC W/AUTO DIFF WBC: CPT | Performed by: PHYSICIAN ASSISTANT

## 2022-12-27 PROCEDURE — 0 GADOBENATE DIMEGLUMINE 529 MG/ML SOLUTION: Performed by: EMERGENCY MEDICINE

## 2022-12-27 PROCEDURE — 72158 MRI LUMBAR SPINE W/O & W/DYE: CPT

## 2022-12-27 PROCEDURE — 85730 THROMBOPLASTIN TIME PARTIAL: CPT | Performed by: PHYSICIAN ASSISTANT

## 2022-12-27 PROCEDURE — 85610 PROTHROMBIN TIME: CPT | Performed by: PHYSICIAN ASSISTANT

## 2022-12-27 PROCEDURE — 81003 URINALYSIS AUTO W/O SCOPE: CPT | Performed by: EMERGENCY MEDICINE

## 2022-12-27 PROCEDURE — 80053 COMPREHEN METABOLIC PANEL: CPT | Performed by: PHYSICIAN ASSISTANT

## 2022-12-27 PROCEDURE — 71101 X-RAY EXAM UNILAT RIBS/CHEST: CPT

## 2022-12-27 RX ORDER — LIDOCAINE 50 MG/G
1 PATCH TOPICAL EVERY 24 HOURS
Qty: 6 EACH | Refills: 0 | Status: SHIPPED | OUTPATIENT
Start: 2022-12-27

## 2022-12-27 RX ORDER — ACETAMINOPHEN 500 MG
1000 TABLET ORAL ONCE
Status: COMPLETED | OUTPATIENT
Start: 2022-12-27 | End: 2022-12-27

## 2022-12-27 RX ORDER — SODIUM CHLORIDE 0.9 % (FLUSH) 0.9 %
10 SYRINGE (ML) INJECTION AS NEEDED
Status: DISCONTINUED | OUTPATIENT
Start: 2022-12-27 | End: 2022-12-27 | Stop reason: HOSPADM

## 2022-12-27 RX ORDER — LIDOCAINE 50 MG/G
1 PATCH TOPICAL
Status: DISCONTINUED | OUTPATIENT
Start: 2022-12-27 | End: 2022-12-27 | Stop reason: HOSPADM

## 2022-12-27 RX ORDER — METHOCARBAMOL 750 MG/1
750 TABLET, FILM COATED ORAL ONCE
Status: COMPLETED | OUTPATIENT
Start: 2022-12-27 | End: 2022-12-27

## 2022-12-27 RX ADMIN — LIDOCAINE 1 PATCH: 50 PATCH TOPICAL at 13:08

## 2022-12-27 RX ADMIN — METHOCARBAMOL TABLETS 750 MG: 750 TABLET, COATED ORAL at 13:08

## 2022-12-27 RX ADMIN — GADOBENATE DIMEGLUMINE 19 ML: 529 INJECTION, SOLUTION INTRAVENOUS at 16:43

## 2022-12-27 RX ADMIN — ACETAMINOPHEN 1000 MG: 500 TABLET, FILM COATED ORAL at 13:08

## 2022-12-27 NOTE — ED PROVIDER NOTES
EMERGENCY DEPARTMENT ENCOUNTER    CHIEF COMPLAINT  Chief Complaint: Back pain post fall  History given by: Patient  History limited by: Nothing  Room Number: 24/24  PMD: Arminda Raymundo MD      HPI:  Pt is a 82 y.o. male presents from home reporting right lower back pain after fall 2 days prior to arrival.  Patient denies chest pain, shortness of air, ports reports the pain is worse with movement.  Patient reports his fall was secondary to a slip, denies hitting his head or loss of consciousness, nausea or vomiting.  Patient denies recent cough, fever, abdominal pain.  Patient does report that he has had 3 episodes of incontinence over the past couple of days since his fall.  Patient reports he is aware he needs to go urinate but is unable to move quickly enough to get to the bathroom and soils himself.  Patient denies fecal incontinence.  Patient denies weakness or numbness of lower extremities, difficulty walking.    Duration: 2 days  Associated Symptoms: Urinary incontinence  Aggravating Factors: Movement  Alleviating Factors: Nothing  Treatment before arrival: Pain patch on the area    PAST MEDICAL HISTORY  Active Ambulatory Problems     Diagnosis Date Noted   • Encounter for screening for malignant neoplasm of colon 08/12/2021   • Polyuria 08/12/2021     Resolved Ambulatory Problems     Diagnosis Date Noted   • No Resolved Ambulatory Problems     Past Medical History:   Diagnosis Date   • Allergic    • Anxiety    • Anxiety disorder    • Cancer (HCC)    • Cervical pain    • Chest pain    • CPAP (continuous positive airway pressure) dependence    • GERD (gastroesophageal reflux disease)    • History of EKG 03/20/2014   • Hyperlipidemia    • Hypertension    • Increased serum lipids    • Insomnia    • Left ear pain    • Right knee pain    • Sleep apnea    • Sleep disturbance        PAST SURGICAL HISTORY  Past Surgical History:   Procedure Laterality Date   • BASAL CELL CARCINOMA EXCISION      Nose/ left leg   •  COLONOSCOPY  10/10/2007    [facility]   • COLONOSCOPY N/A 09/01/2021    Procedure: COLONOSCOPY TO CECUM AND INTO TI;  Surgeon: Gamaliel Hobbs MD;  Location: [facility] ENDOSCOPY;  Service: Gastroenterology;  Laterality: N/A;  pre: screening  post: diverticulosis and hemorrhoids   • FLEXIBLE SIGMOIDOSCOPY  2001   • MOUTH SURGERY     • SCALP LESION REMOVAL W/ FLAP AND SKIN GRAFT         FAMILY HISTORY  Family History   Problem Relation Age of Onset   • Heart disease Father    • Lung disease Father    • Hypertension Father    • Diabetes Brother    • Cancer Brother    • Stroke Brother    • Malig Hyperthermia Neg Hx        SOCIAL HISTORY  Social History     Socioeconomic History   • Marital status:    Tobacco Use   • Smoking status: Never   • Smokeless tobacco: Never   Vaping Use   • Vaping Use: Never used   Substance and Sexual Activity   • Alcohol use: Yes     Alcohol/week: 0.0 standard drinks     Comment: occasional   • Drug use: No   • Sexual activity: Defer       ALLERGIES  Bee venom, Ciprofloxacin, Penicillins, and Shellfish-derived products    REVIEW OF SYSTEMS  Review of Systems   Constitutional: Negative for chills and fever.   HENT: Negative for sore throat and trouble swallowing.    Eyes: Negative for visual disturbance.   Respiratory: Negative for cough and shortness of breath.    Cardiovascular: Negative for chest pain and leg swelling.   Gastrointestinal: Negative for abdominal pain, diarrhea and vomiting.   Endocrine: Negative.    Genitourinary: Negative for decreased urine volume and frequency.        Urinary incontinence   Musculoskeletal: Positive for back pain. Negative for neck pain.   Skin: Negative for rash.   Allergic/Immunologic: Negative.    Neurological: Negative for weakness and numbness.   Hematological: Negative.    Psychiatric/Behavioral: Negative.    All other systems reviewed and are negative.      PHYSICAL EXAM  ED Triage Vitals   Temp Heart Rate Resp BP SpO2    12/27/22 1227 12/27/22 1220 12/27/22 1220 12/27/22 1220 12/27/22 1220   97.7 °F (36.5 °C) 75 18 144/83 98 %      Temp src Heart Rate Source Patient Position BP Location FiO2 (%)   12/27/22 1227 -- 12/27/22 1443 12/27/22 1443 --   Tympanic  Lying Right arm        Physical Exam  Vitals and nursing note reviewed.   Constitutional:       General: He is in acute distress.   HENT:      Head: Normocephalic and atraumatic.   Eyes:      Extraocular Movements: Extraocular movements intact and EOM normal.      Pupils: Pupils are equal, round, and reactive to light.   Cardiovascular:      Rate and Rhythm: Normal rate and regular rhythm.      Pulses:           Posterior tibial pulses are 2+ on the right side and 2+ on the left side.      Heart sounds: Normal heart sounds. No murmur heard.  Pulmonary:      Effort: Pulmonary effort is normal. No respiratory distress.      Breath sounds: Normal breath sounds. No wheezing.   Chest:      Chest wall: Tenderness ( Patient with tenderness to palpation right posterior lateral ribs without crepitus) present.   Abdominal:      General: Bowel sounds are normal.      Palpations: Abdomen is soft.      Tenderness: There is no abdominal tenderness. There is no guarding or rebound.   Musculoskeletal:         General: No swelling, tenderness or edema. Normal range of motion.      Cervical back: Normal range of motion. No tenderness.      Right lower leg: No edema.      Left lower leg: No edema.      Comments: Negative straight leg raise bilaterally, sensation, strength intact bilateral lower extremities, paravertebral tenderness right lower thoracic, lumbar area, no midline vertebral tenderness to palpation   Skin:     General: Skin is warm and dry.      Capillary Refill: Capillary refill takes less than 2 seconds.   Neurological:      General: No focal deficit present.      Mental Status: He is alert and oriented to person, place, and time.      Sensory: No sensory deficit.      Motor: No  weakness.      Gait: Gait normal.      Deep Tendon Reflexes: Reflexes normal.      Comments: Negative straight leg raise bilaterally, patient able to ambulate well.   Psychiatric:         Mood and Affect: Mood and affect normal.         LAB RESULTS  Lab Results (last 24 hours)     Procedure Component Value Units Date/Time    CBC & Differential [[id number]]  (Normal) Collected: 12/27/22 1303    Specimen: Blood Updated: 12/27/22 1330    Narrative:      The following orders were created for panel order CBC & Differential.  Procedure                               Abnormality         Status                     ---------                               -----------         ------                     CBC Auto Differential[[id number]]        Normal              Final result                 Please view results for these tests on the individual orders.    Comprehensive Metabolic Panel [[id number]]  (Abnormal) Collected: 12/27/22 1303    Specimen: Blood Updated: 12/27/22 1349     Glucose 110 mg/dL      BUN 11 mg/dL      Creatinine 1.02 mg/dL      Sodium 140 mmol/L      Potassium 4.4 mmol/L      Chloride 102 mmol/L      CO2 29.9 mmol/L      Calcium 9.7 mg/dL      Total Protein 6.7 g/dL      Albumin 4.9 g/dL      ALT (SGPT) 29 U/L      AST (SGOT) 28 U/L      Alkaline Phosphatase 96 U/L      Total Bilirubin 0.7 mg/dL      Globulin 1.8 gm/dL      A/G Ratio 2.7 g/dL      BUN/Creatinine Ratio 10.8     Anion Gap 8.1 mmol/L      eGFR 74.3 mL/min/1.73      Comment: National Kidney Foundation and American Society of Nephrology (ASN) Task Force recommended calculation based on the Chronic Kidney Disease Epidemiology Collaboration (CKD-EPI) equation refit without adjustment for race.       Narrative:      GFR Normal >60  Chronic Kidney Disease <60  Kidney Failure <15    The GFR formula is only valid for adults with stable renal function between ages 18 and 70.    Protime-INR [[id number]]  (Normal) Collected: 12/27/22 1303    Specimen: Blood  Updated: 12/27/22 1346     Protime 13.5 Seconds      INR 1.01    aPTT [[id number]]  (Normal) Collected: 12/27/22 1303    Specimen: Blood Updated: 12/27/22 1346     PTT 25.3 seconds     CBC Auto Differential [[id number]]  (Normal) Collected: 12/27/22 1303    Specimen: Blood Updated: 12/27/22 1330     WBC 9.00 10*3/mm3      RBC 4.81 10*6/mm3      Hemoglobin 14.3 g/dL      Hematocrit 42.5 %      MCV 88.4 fL      MCH 29.7 pg      MCHC 33.6 g/dL      RDW 12.3 %      RDW-SD 40.5 fl      MPV 9.6 fL      Platelets 244 10*3/mm3      Neutrophil % 66.7 %      Lymphocyte % 22.4 %      Monocyte % 8.7 %      Eosinophil % 1.6 %      Basophil % 0.3 %      Immature Grans % 0.3 %      Neutrophils, Absolute 6.00 10*3/mm3      Lymphocytes, Absolute 2.02 10*3/mm3      Monocytes, Absolute 0.78 10*3/mm3      Eosinophils, Absolute 0.14 10*3/mm3      Basophils, Absolute 0.03 10*3/mm3      Immature Grans, Absolute 0.03 10*3/mm3      nRBC 0.0 /100 WBC           I ordered the above labs and reviewed the results    RADIOLOGY  XR Ribs Right With PA Chest   Final Result      MRI Lumbar Spine With & Without Contrast   Final Result   There is no evidence of marrow edema to suggest fracture.   There is no evidence of disc herniation. Multilevel degenerative disease   involving the lumbar spine is noted including multilevel mild facet   degenerative disease, broad-based disc osteophyte complexes and   foraminal stenosis as described. A small annular tear is noted involving   the posterior aspect of the disc at L1-L2 The bladder is only partially   visualized but does appear to be distended. The CT examination of   07/22/2021 demonstrated enlargement of the prostate. Clinical   correlation is recommended. Further evaluation could be performed with a   CT examination of the abdomen and pelvis.       This report was finalized on 12/27/2022 8:15 PM by Dr. Domingo Vides M.D.          Right rib detail-NAD    I ordered the above noted radiological studies.  Interpreted by radiologist. Viewed by me in PACS.       PROCEDURES  Procedures      PROGRESS AND CONSULTS  ED Course as of 12/27/22 2205   Tue Dec 27, 2022   1903 RN reports post void bladder scan 200 cc [TO]      ED Course User Index  [TO] Lashawn Duran MD       Discussed with patient and family, patient's daughter who is a pediatrician, over the phone.  They voiced understanding and agree to follow closely with PCP and urology for recheck, further testing, treatment as needed.  Patient agrees to return to the emergency department with weakness, numbness, fever, other concerns.    MEDICAL DECISION MAKING  Results were reviewed/discussed with the patient and they were also made aware of online access. Pt also made aware that some labs, such as cultures, will not be resulted during ER visit and followup with PMD is necessary.       MDM       DIAGNOSIS  Final diagnoses:   Urinary urgency   Lumbar strain, initial encounter   Rib contusion, right, initial encounter       DISPOSITION  DISCHARGE    Patient discharged in stable condition.    Reviewed implications of results, diagnosis, meds, responsibility to follow up, warning signs and symptoms of possible worsening, potential complications and reasons to return to ER    Patient/Family voiced understanding of above instructions.    Discussed plan for discharge, as there is no emergent indication for admission. Patient referred to primary care provider for BP management due to today's BP. Pt/family is agreeable and understands need for follow up and repeat testing.  Pt is aware that discharge does not mean that nothing is wrong but it indicates no emergency is present that requires admission and they must continue care with follow-up as given below or physician of their choice.     FOLLOW-UP  Arminda Raymundo MD  [address]  [place] [id number] [phone]    Schedule an appointment as soon as possible for a visit in 3 days  EVEN IF WELL    Ankit Hawkins  MD ALIE  formerly [facility] [place] [address]  [phone]    Schedule an appointment as soon as possible for a visit in 3 days  EVEN IF WELL         Medication List      New Prescriptions    lidocaine 5 %  Commonly known as: LIDODERM  Place 1 patch on the skin as directed by provider Daily. Remove & Discard patch within 12 hours or as directed by MD        Stop    cyclobenzaprine 10 MG tablet  Commonly known as: FLEXERIL           Where to Get Your Medications      These medications were sent to [facility] [id number] - [place], [place] - [address] AT [facility] [address] [place] - [phone]  - [phone]   [address], [facility] [address]    Phone: [phone]   · lidocaine 5 %           Latest Documented Vital Signs:  As of 15:36 EST  BP- 139/89 HR- 75 Temp- 97.7 °F (36.5 °C) (Tympanic) O2 sat- 98%    --  Patient was wearing facemask when I entered the room and throughout our encounter. Full protective equipment was worn throughout this patient encounter including a face mask, eye protection and gloves. Hand hygiene was performed before donning protective equipment and after removal when leaving the room.      Lashawn Duran MD  12/27/22 5891

## 2022-12-27 NOTE — TELEPHONE ENCOUNTER
Caller: Lucho Blank    Relationship to patient: Self    Best call back number: [phone]    Patient is needing: PATIENT WANTING TO UPDATE DR CHICAS THAT HE IS ADMITTED TO [facility].

## 2022-12-27 NOTE — ED PROVIDER NOTES
TRIAGE PROVIDER NOTE    I personally performed a brief face-to-face medical evaluation of the patient upon their arrival to the emergency department.  This exam was performed in an effort to decrease the time from intake to seeing a provider and to decrease delays in care.  The history of present illness is condensed.  Other providers may see the patient as well if deemed necessary after this evaluation.    HPI: Lucho Blank is a 82 y.o. male with a PMH significant for hypertension, hyperlipidemia, anxiety who presents to the ED c/o atraumatic right-sided low back pain that has developed and progressed over the past 3 days.  He comes in today because his pain is worse but also he has had several episodes of urinary incontinence.  He did sustain a fall yesterday in his kitchen after slipping on a wet surface.  He states that the pain was mild but certainly present prior to the fall.  Since the fall he has developed urinary incontinence on several episodes.  He states that he feels the urge to urinate and before he can make it to the restroom he is unable to control his bladder and urinates on himself.  He does describe some increased weakness in both of his lower extremities but is able to bear weight without difficulty.  He has not taken any medications to alleviate symptoms prior to arrival.  No prior operations to the low back.      FOCUSED PHYSICAL EXAM:  General: No acute distress  Lungs: Clear to auscultation bilaterally, no wheezes  Heart: Regular rate and rhythm, no murmur  Abdomen: Soft, nontender, nondistended  Extremities: No gross injury or deformity  MSK: No midline lumbar spinal tenderness.  Right-sided paraspinal tenderness to palpation.  Limited range of motion secondary to pain.  Neurological: Sensation grossly intact bilateral lower extremities with 4/5 weakness noted with dorsiflexion and plantarflexion of bilateral ankles and feet.       ASSESSMENT/PLAN:  MRI to rule out cauda equina syndrome.   Basic labs.  Symptom control with supportive medications.      Provider Attestation:  I personally reviewed the past medical history, past surgical history, social history, family history, current medications, and allergies as they appear in the chart.    ______________________________________________________________________     Tashi Fernandes PA  01/12/23 1606

## 2022-12-27 NOTE — ED NOTES
Patient from home via ems; c/o right side flank pain and frequent urination. No known hx of kidney stones. Denies dysuria.

## 2022-12-28 ENCOUNTER — PATIENT OUTREACH (OUTPATIENT)
Dept: CASE MANAGEMENT | Facility: OTHER | Age: 82
End: 2022-12-28

## 2022-12-28 ENCOUNTER — TELEPHONE (OUTPATIENT)
Dept: FAMILY MEDICINE CLINIC | Facility: CLINIC | Age: 82
End: 2022-12-28

## 2022-12-28 NOTE — DISCHARGE INSTRUCTIONS
You are advised to follow closely with Dr. Raymundo in 2-3 days for recheck, final results of lab work and imaging testing, and further testing/treatment as needed.    Heat and gentle stretching for low back.  Hydrate well    Follow with Dr. Hawkins or urologist of your choice in 1 to 2 weeks as needed for continued urinary urgency.      Please return to the emergency department immediately with chest pain different than usual for you, shortness of air, abdominal pain, persistent vomiting/fever, blood in emesis or stool, lightheadedness/fainting, problems with speech, one sided weakness/numbness, persistent or worsening problems with vision,  or for worsening of symptoms or other concerns.    
PAST SURGICAL HISTORY:  No significant past surgical history

## 2022-12-28 NOTE — OUTREACH NOTE
AMBULATORY CASE MANAGEMENT NOTE    Name and Relationship of Patient/Support Person: Lucho Blank - Al    Patient Outreach  RN-ACM outreach with patient.  Discussed 12/27/22  ED visit regarding urinary urgency; lumbar strain; rib contusion with fall. Patient treated and discharged home. Patient states to be compliant with ED recommendations; voiced intent to obtain Lidocaine patch and PCP appointment as ED directed. Patient states improvement regarding back pain; urinary incontinence but states to have low back pain with certain movements.. Patient states to have had no further difficulty with falls; states  no difficulty with chest pain; SOB; appetite or sleeping. Reviewed with patient ED AVS recommendations; education; role of RN-ACM and HRCM case management services. Patient verbalized understanding. Patient states to appreciate patient outreach and declines needs for further outreach at this time. No further question voiced at this time.   Adult Patient Profile  Questions/Answers    Flowsheet Row Most Recent Value   Symptoms/Conditions Managed at Home musculoskeletal, genitourinary   Genitourinary Symptoms/Conditions incontinence, other (see comments)  [urinary urgency]   Genitourinary Management Strategies other (see comments)  [physician follow up]   Musculoskeletal Symptoms/Conditions back pain, mobility limited, unsteady gait, other (see comments)  [rib contusion]   Musculoskeletal Management Strategies adequate rest, medication therapy, other (see comments)  [Physician follow up]   Barriers to Taking Medication as Prescribed none   Primary Source of Support/Comfort child(dejuan)   Name of Support/Comfort Primary Source Has assistance from family as needed   People in Home alone        Social Work Assessment  Questions/Answers    Flowsheet Row Most Recent Value   People in Home alone   Functional Status Comments Patient states to be independent with ADL's,  light meal preparation,  ambulates without  assistive device and currently receiving assistance with transportation.        Send Education  Questions/Answers    Flowsheet Row Most Recent Value   Annual Wellness Visit:  Patient Has Completed   Other Patient Education/Resources  24/7 [facility] Nurse Call Line, [place], Advanced Care Planning   24/7 Nurse Call Line Education Method Verbal   ACP Education Method Verbal   MyChart Education Method Verbal   Advanced Directives: Patient Has        Education Documentation  Unresolved/Worsening Symptoms, taught by Ghazala Barkley RN at 12/28/2022 12:45 PM.  Learner: Patient  Readiness: Acceptance  Method: Explanation  Response: Verbalizes Understanding    unresolved or worsening symptoms, taught by Ghazala Barkley RN at 12/28/2022 12:45 PM.  Learner: Patient  Readiness: Acceptance  Method: Explanation  Response: Verbalizes Understanding    sleep/rest, taught by Ghazala Barkley RN at 12/28/2022 12:45 PM.  Learner: Patient  Readiness: Acceptance  Method: Explanation  Response: Verbalizes Understanding    medication management, taught by Ghazala Barkley RN at 12/28/2022 12:45 PM.  Learner: Patient  Readiness: Acceptance  Method: Explanation  Response: Verbalizes Understanding    back health, taught by Ghazala Barkley RN at 12/28/2022 12:45 PM.  Learner: Patient  Readiness: Acceptance  Method: Explanation  Response: Verbalizes Understanding    Unresolved/Worsening Symptoms, taught by Ghazala Barkley RN at 12/28/2022 12:45 PM.  Learner: Patient  Readiness: Acceptance  Method: Explanation  Response: Verbalizes Understanding    Home Safety, taught by Ghazala Barkley RN at 12/28/2022 12:45 PM.  Learner: Patient  Readiness: Acceptance  Method: Explanation  Response: Verbalizes Understanding          GHAZALA SOLANO  Ambulatory Case Management    12/28/2022, 12:45 EST

## 2022-12-28 NOTE — TELEPHONE ENCOUNTER
Caller: Lucho Blank     Relationship: SELF     Best call back number: [phone]     What is your medical concern?     PATIENT WANTED YOU TO KNOW THAT HE WAS AT THE ER YESTERDAY.  HE IS UNABLE TO DRIVE AT THIS TIME DUE TO HIS HEALTH CONDITIONS.        ANY QUESTIONS OR CONCERNS PLEASE CALL PATIENT.

## 2023-01-03 RX ORDER — ICOSAPENT ETHYL 1000 MG/1
CAPSULE ORAL
Qty: 360 CAPSULE | Refills: 3 | Status: SHIPPED | OUTPATIENT
Start: 2023-01-03

## 2023-01-03 NOTE — TELEPHONE ENCOUNTER
Rx Refill Note  Requested Prescriptions     Pending Prescriptions Disp Refills   • Vascepa 1 g capsule capsule [Pharmacy Med Name: VASCEPA CAPS 1GM] 360 capsule 3     Sig: TAKE 1 CAPSULE TWICE A DAY WITH MEALS      Last office visit with prescribing clinician: 12/23/2022   Last telemedicine visit with prescribing clinician: 1/5/2023   Next office visit with prescribing clinician: 1/5/2023                         Would you like a call back once the refill request has been completed: [] Yes [] No    If the office needs to give you a call back, can they leave a voicemail: [] Yes [] No    Joshua Schmitz Rep  01/03/23, 10:46 EST

## 2023-01-05 ENCOUNTER — OFFICE VISIT (OUTPATIENT)
Dept: FAMILY MEDICINE CLINIC | Facility: CLINIC | Age: 83
End: 2023-01-05
Payer: MEDICARE

## 2023-01-05 VITALS
HEIGHT: 73 IN | TEMPERATURE: 97.6 F | WEIGHT: 205 LBS | SYSTOLIC BLOOD PRESSURE: 122 MMHG | HEART RATE: 85 BPM | DIASTOLIC BLOOD PRESSURE: 74 MMHG | BODY MASS INDEX: 27.17 KG/M2 | OXYGEN SATURATION: 99 %

## 2023-01-05 DIAGNOSIS — R73.9 HYPERGLYCEMIA: ICD-10-CM

## 2023-01-05 DIAGNOSIS — R35.0 URINARY FREQUENCY: ICD-10-CM

## 2023-01-05 DIAGNOSIS — S20.211S RIB CONTUSION, RIGHT, SEQUELA: ICD-10-CM

## 2023-01-05 DIAGNOSIS — M54.50 ACUTE RIGHT-SIDED LOW BACK PAIN WITHOUT SCIATICA: Primary | ICD-10-CM

## 2023-01-05 DIAGNOSIS — N40.1 BENIGN PROSTATIC HYPERPLASIA WITH URINARY FREQUENCY: ICD-10-CM

## 2023-01-05 DIAGNOSIS — R35.0 BENIGN PROSTATIC HYPERPLASIA WITH URINARY FREQUENCY: ICD-10-CM

## 2023-01-05 LAB
BILIRUB BLD-MCNC: ABNORMAL MG/DL
CLARITY, POC: CLEAR
COLOR UR: YELLOW
EXPIRATION DATE: ABNORMAL
GLUCOSE UR STRIP-MCNC: NEGATIVE MG/DL
HBA1C MFR BLD: 5.5 % (ref 4.8–5.6)
KETONES UR QL: NEGATIVE
LEUKOCYTE EST, POC: NEGATIVE
Lab: ABNORMAL
NITRITE UR-MCNC: NEGATIVE MG/ML
PH UR: 6.5 [PH] (ref 5–8)
PROT UR STRIP-MCNC: ABNORMAL MG/DL
RBC # UR STRIP: NEGATIVE /UL
SP GR UR: 1.03 (ref 1–1.03)
UROBILINOGEN UR QL: ABNORMAL

## 2023-01-05 PROCEDURE — 81003 URINALYSIS AUTO W/O SCOPE: CPT | Performed by: FAMILY MEDICINE

## 2023-01-05 PROCEDURE — 99214 OFFICE O/P EST MOD 30 MIN: CPT | Performed by: FAMILY MEDICINE

## 2023-01-05 RX ORDER — CHOLECALCIFEROL (VITAMIN D3) 125 MCG
5 CAPSULE ORAL NIGHTLY PRN
COMMUNITY

## 2023-01-05 RX ORDER — TAMSULOSIN HYDROCHLORIDE 0.4 MG/1
1 CAPSULE ORAL DAILY
Qty: 30 CAPSULE | Refills: 0 | Status: SHIPPED | OUTPATIENT
Start: 2023-01-05 | End: 2023-02-07 | Stop reason: SDUPTHER

## 2023-01-05 NOTE — PROGRESS NOTES
"Chief Complaint  Hospital Follow Up Visit, Fall, and Urinary Frequency    Subjective        Lucho Blank presents to [facility]  History of Present Illness  Lucho Blank is a 82-year-old male who presents today for a follow-up from an emergency room visit on 12/27/2022.    The patient states that he fell on 12/27/2022. He reports that he was turning from the sink when his leg went out from under him and he hit his back on the kitchen counter. He states that he went to urgent care on 12/30/2022. His pain is more on his right side. He denies feeling dizzy or lightheaded when he fell. He notes that he was not incoherent when he fell. He reports that he was in pain when he fell. He states that he feels pressure when he blows his nose. He notes that his pain is improving. He denies any pain on the left side.    He reports that he has been experiencing urinary frequency for several weeks. He states that he had a urinalysis done in the emergency room. He denies taking any medication for his enlarged prostate. He notes that he is not able to empty his bladder completely. He has some urine dribble. He notes that he is trying to drink enough water. He reports that he had a large glass of apple juice this morning. He states that he occasionally feels thirsty. He denies being prescribed any medication by his urologist.    He reports that he sees Dr. Johnie Chan, his urologist. He states that he has an appointment with Dr. Chan within the next 3 months.    He states that he lives with a roommate.  Objective   Vital Signs:  /74 (BP Location: Right arm, Patient Position: Sitting)   Pulse 85   Temp 97.6 °F (36.4 °C) (Infrared)   Ht 185.4 cm (72.99\")   Wt 93 kg (205 lb)   SpO2 99%   BMI 27.05 kg/m²   Estimated body mass index is 27.05 kg/m² as calculated from the following:    Height as of this encounter: 185.4 cm (72.99\").    Weight as of this encounter: 93 kg (205 " lb).          Physical Exam  Constitutional:       General: He is not in acute distress.     Appearance: Normal appearance. He is well-developed.   HENT:      Head: Normocephalic and atraumatic.      Right Ear: Tympanic membrane normal.      Left Ear: Tympanic membrane normal.      Nose: Nose normal.      Mouth/Throat:      Mouth: Mucous membranes are moist.   Eyes:      General:         Right eye: No discharge.         Left eye: No discharge.      Extraocular Movements: Extraocular movements intact.      Pupils: Pupils are equal, round, and reactive to light.   Cardiovascular:      Rate and Rhythm: Normal rate and regular rhythm.      Pulses: Normal pulses.      Heart sounds: Normal heart sounds.   Pulmonary:      Effort: Pulmonary effort is normal.      Breath sounds: Normal breath sounds. No wheezing or rales.   Chest:      Chest wall: Tenderness present. No deformity or crepitus.   Abdominal:      General: Bowel sounds are normal.      Palpations: Abdomen is soft. There is no mass.      Tenderness: There is no abdominal tenderness.   Musculoskeletal:      Cervical back: Normal range of motion and neck supple.      Right lower leg: No edema.      Left lower leg: No edema.   Lymphadenopathy:      Cervical: No cervical adenopathy.   Neurological:      General: No focal deficit present.      Mental Status: He is alert and oriented to person, place, and time.        Result Review :      Data reviewed: Recent hospitalization notes er note           Assessment and Plan   Diagnoses and all orders for this visit:    1. Acute right-sided low back pain without sciatica (Primary)  Lucho Blank is an 82-year-old male patient seen today for follow-up from emergency room visit secondary to fall. Results reviewed. The patient is still complaining of pain more on his right side lower ribs. I reviewed the x-ray and MRI results done in the emergency room. As per patient, his pain is getting better. Continue lidocaine patch as  needed. Follow up in 1 month.  2. Rib contusion, right, sequela    3. Benign prostatic hyperplasia with urinary frequency  -     tamsulosin (FLOMAX) 0.4 MG capsule 24 hr capsule; Take 1 capsule by mouth Daily.  Dispense: 30 capsule; Refill: 0    4. Urinary frequency  He is also complaining of urinary frequency. UA done in the office today , negative nitrate had some protein . I will send it for the culture. The patient also had a history of thinning of the stream. He has a history of BPH, but not on medication. I will start him on Flomax. He will also schedule an appointment with his urologist. He will call me and let me know if there is improvement in urinary frequency. Follow up in 4 to 6 weeks.  5. Hyperglycemia  -     Hemoglobin A1c             Follow Up   No follow-ups on file.  Patient was given instructions and counseling regarding his condition or for health maintenance advice. Please see specific information pulled into the AVS if appropriate.     Transcribed from ambient dictation for Arminda Raymundo MD by Camille Bruner.  01/05/23   10:46 EST    Patient or patient representative verbalized consent to the visit recording.  I have personally performed the services described in this document as transcribed by the above individual, and it is both accurate and complete.

## 2023-01-19 ENCOUNTER — TELEPHONE (OUTPATIENT)
Dept: FAMILY MEDICINE CLINIC | Facility: CLINIC | Age: 83
End: 2023-01-19

## 2023-01-19 ENCOUNTER — TELEMEDICINE (OUTPATIENT)
Dept: FAMILY MEDICINE CLINIC | Facility: CLINIC | Age: 83
End: 2023-01-19
Payer: MEDICARE

## 2023-01-19 DIAGNOSIS — U07.1 UPPER RESPIRATORY TRACT INFECTION DUE TO COVID-19 VIRUS: Primary | ICD-10-CM

## 2023-01-19 DIAGNOSIS — J06.9 UPPER RESPIRATORY TRACT INFECTION DUE TO COVID-19 VIRUS: Primary | ICD-10-CM

## 2023-01-19 PROCEDURE — 99214 OFFICE O/P EST MOD 30 MIN: CPT | Performed by: FAMILY MEDICINE

## 2023-01-19 NOTE — PROGRESS NOTES
"Chief Complaint  Cough (Positive for covid ) and Fatigue    Subjective        History of Present Illness Lucho Blank is a 82-year-old male patient video encounter scheduled with him with complaints of dry cough, congestion and fatigue.  Patient tested himself this morning and his test was positive for COVID-19.  Consent for video visit taken from patient. I identified her by  picture and date of birth.  Patient location at his  house   I did this  video encounter from my office , [address] [place] [address].  The visit included audio and video interaction.  No technical issues occurred during the visit    He is also checking his oxygen saturation is 93 this morning.  He checked this afternoon and it was 92 after walking outside.  He denies any chest pain any body ache or fever.    Objective   Vital Signs:  There were no vitals taken for this visit.  Estimated body mass index is 27.05 kg/m² as calculated from the following:    Height as of 1/5/23: 185.4 cm (72.99\").    Weight as of 1/5/23: 93 kg (205 lb).             Physical Exam  Constitutional:       Appearance: Normal appearance.   Pulmonary:      Effort: Pulmonary effort is normal.   Neurological:      Mental Status: He is alert and oriented to person, place, and time.        Result Review :              Is a     Assessment and Plan   Diagnoses and all orders for this visit:    1. Upper respiratory tract infection due to COVID-19 virus (Primary)    Other orders  -     Nirmatrelvir&Ritonavir 300/100 (PAXLOVID) 20 x 150 MG & 10 x 100MG tablet therapy pack tablet; Take 3 tablets by mouth 2 (Two) Times a Day for 5 days.  Dispense: 30 tablet; Refill: 0      Lucho Blank is a 82-year-old male patient video encounter done with patient for  URI secondary to COVID infection.  Available treatment discussed with patient I advised him to continue taking over-the-counter cough medication for symptomatic relief of cough.  We also discussed about back paxlovid , side " effects discussed with patient.  I have attached information with his MyChart.  Also advised him not to take in Lipitor while he is taking medication he has already taking the Lipitor today I advised him not to start taking paxlovid today.  He will start from tomorrow and he will not take Lipitor while he is on paxlovid and few days after finishing the treatment.  Advised him to go to the emergency room for worsening of his symptoms.           Follow Up   No follow-ups on file.  Patient was given instructions and counseling regarding his condition or for health maintenance advice. Please see specific information pulled into the AVS if appropriate.

## 2023-01-19 NOTE — TELEPHONE ENCOUNTER
Caller: Lucho Blank    Relationship to patient: Self    Best call back number: [phone] (Mobile)    Chief complaint: LABORED BREATHING WHEN SITTING STILL AND MOVING AROUND AND COUGH, BLOOD IN STOOL AND FREQUENT URINATION    Patient directed to call 911 or go to their nearest emergency room.     Patient verbalized understanding: [x] Yes  [] No  If no, why?    Additional notes: PATIENT CALLED AND STATE HE JUST TESTED HIMSELF FOR COVID AT HOME AND IT WAS POSITIVE ON 01/19/2023, HE STATED HE HAS SOME LABORED BREATHING WHEN SITTING STILL AND MOVING AROUND AND COUGH, BLOOD IN STOOL AND FREQUENT URINATION, PER [facility], ADVISED PATIENT TO GO TO EMERGENCY ROOM. PATIENT STATED HE WILL TRY TO FIND A WAY TO GET THERE AND VOICED UNDERSTANDING.

## 2023-01-26 ENCOUNTER — TELEPHONE (OUTPATIENT)
Dept: FAMILY MEDICINE CLINIC | Facility: CLINIC | Age: 83
End: 2023-01-26
Payer: MEDICARE

## 2023-01-26 NOTE — TELEPHONE ENCOUNTER
Caller: Lucho Blank    Relationship: Self    Best call back number:[phone]    What medications are you currently taking: atorvastatin (LIPITOR) 20 MG tablet     What are your concerns: PATIENT CALLING WANTING TO KNOW WHEN SHOULD HE START TAKING THE LIPITOR AGAIN HE TOOK HIS LAST DOSE OF PAXLOVID ON 01/24/23.

## 2023-02-07 ENCOUNTER — OFFICE VISIT (OUTPATIENT)
Dept: FAMILY MEDICINE CLINIC | Facility: CLINIC | Age: 83
End: 2023-02-07
Payer: MEDICARE

## 2023-02-07 VITALS
OXYGEN SATURATION: 95 % | HEIGHT: 72 IN | HEART RATE: 80 BPM | SYSTOLIC BLOOD PRESSURE: 120 MMHG | WEIGHT: 210.4 LBS | BODY MASS INDEX: 28.5 KG/M2 | TEMPERATURE: 96.4 F | DIASTOLIC BLOOD PRESSURE: 72 MMHG

## 2023-02-07 DIAGNOSIS — U07.1 UPPER RESPIRATORY TRACT INFECTION DUE TO COVID-19 VIRUS: ICD-10-CM

## 2023-02-07 DIAGNOSIS — R35.0 BENIGN PROSTATIC HYPERPLASIA WITH URINARY FREQUENCY: ICD-10-CM

## 2023-02-07 DIAGNOSIS — R53.83 OTHER FATIGUE: Primary | ICD-10-CM

## 2023-02-07 DIAGNOSIS — J06.9 UPPER RESPIRATORY TRACT INFECTION DUE TO COVID-19 VIRUS: ICD-10-CM

## 2023-02-07 DIAGNOSIS — N40.1 BENIGN PROSTATIC HYPERPLASIA WITH URINARY FREQUENCY: ICD-10-CM

## 2023-02-07 PROCEDURE — 99213 OFFICE O/P EST LOW 20 MIN: CPT | Performed by: FAMILY MEDICINE

## 2023-02-07 RX ORDER — TAMSULOSIN HYDROCHLORIDE 0.4 MG/1
1 CAPSULE ORAL DAILY
Qty: 90 CAPSULE | Refills: 1 | Status: SHIPPED | OUTPATIENT
Start: 2023-02-07

## 2023-02-07 RX ORDER — TAMSULOSIN HYDROCHLORIDE 0.4 MG/1
1 CAPSULE ORAL DAILY
Qty: 30 CAPSULE | Refills: 0 | Status: SHIPPED | OUTPATIENT
Start: 2023-02-07 | End: 2023-02-07

## 2023-02-07 NOTE — PROGRESS NOTES
"Chief Complaint  Fatigue (Still fatigued / fu from covid )    Subjective        Lucho Blank presents to [facility]  History of Present Illness here for follow-up on COVID infection, patient complaining of fatigue.  Patient also history of polyuria and increased urinary frequency started on Flomax has his urinary frequency has improved.    Objective   Vital Signs:  /72   Pulse 80   Temp 96.4 °F (35.8 °C) (Temporal)   Ht 182.9 cm (72\")   Wt 95.4 kg (210 lb 6.4 oz)   SpO2 95%   BMI 28.54 kg/m²   Estimated body mass index is 28.54 kg/m² as calculated from the following:    Height as of this encounter: 182.9 cm (72\").    Weight as of this encounter: 95.4 kg (210 lb 6.4 oz).             Physical Exam  Constitutional:       General: He is not in acute distress.     Appearance: Normal appearance. He is well-developed.   HENT:      Head: Normocephalic and atraumatic.      Right Ear: Tympanic membrane normal.      Left Ear: Tympanic membrane normal.      Mouth/Throat:      Mouth: Mucous membranes are moist.   Eyes:      General:         Right eye: No discharge.         Left eye: No discharge.      Extraocular Movements: Extraocular movements intact.      Pupils: Pupils are equal, round, and reactive to light.   Cardiovascular:      Rate and Rhythm: Normal rate and regular rhythm.      Pulses: Normal pulses.      Heart sounds: Normal heart sounds.   Pulmonary:      Effort: Pulmonary effort is normal.      Breath sounds: Normal breath sounds. No wheezing or rales.   Abdominal:      General: Bowel sounds are normal.      Palpations: Abdomen is soft. There is no mass.      Tenderness: There is no abdominal tenderness.   Musculoskeletal:      Cervical back: Normal range of motion and neck supple.      Right lower leg: No edema.      Left lower leg: No edema.   Lymphadenopathy:      Cervical: No cervical adenopathy.   Neurological:      General: No focal deficit present.      Mental " Status: He is alert and oriented to person, place, and time.        Result Review :                   Assessment and Plan   Diagnoses and all orders for this visit:    1. Other fatigue (Primary)    2. Upper respiratory tract infection due to COVID-19 virus    3. Benign prostatic hyperplasia with urinary frequency  -     Discontinue: tamsulosin (FLOMAX) 0.4 MG capsule 24 hr capsule; Take 1 capsule by mouth Daily.  Dispense: 30 capsule; Refill: 0  -     tamsulosin (FLOMAX) 0.4 MG capsule 24 hr capsule; Take 1 capsule by mouth Daily.  Dispense: 90 capsule; Refill: 1      Lucho Blank is a 89-year-old male patient came seen today for  Fatigue .  Patient complaining of increased fatigue since his COVID infection 2 weeks ago.  He finished 5 days of Paxlovid.  Denies any cough congestion cold or fever.  Reassurance given to patient and told patient that his fatigue could last for few weeks but come back if his experience any chest pain shortness of breath cough.  He is also seen today for  BPH, patient with history of increased urinary frequency started on Flomax his urinary frequency has improved continue same new prescription given to patient follow-up in 4 months         Follow Up   No follow-ups on file.  Patient was given instructions and counseling regarding his condition or for health maintenance advice. Please see specific information pulled into the AVS if appropriate.

## 2023-02-16 ENCOUNTER — TELEPHONE (OUTPATIENT)
Dept: FAMILY MEDICINE CLINIC | Facility: CLINIC | Age: 83
End: 2023-02-16

## 2023-02-16 NOTE — TELEPHONE ENCOUNTER
PATIENT IS CALLING TO INFORM DR CHICAS THAT HIS BLOOD PRESSURE HAS BEEN AVERAGING 127/72.     PATIENT STATES THAT THE NUMBERS DON'T FLUCTUATE MUCH HIGHER OR LOWER THAN THAT.     PLEASE ADVISE WITH ANY QUESTIONS OR CONCERNS [phone]

## 2023-03-29 ENCOUNTER — TELEPHONE (OUTPATIENT)
Dept: FAMILY MEDICINE CLINIC | Facility: CLINIC | Age: 83
End: 2023-03-29

## 2023-03-29 NOTE — TELEPHONE ENCOUNTER
Caller: Lucho Blank call back number:[phone]       What is your medical concern? BLOOD PRESSURE UPDATE    PATIENT TOP NUMBER FOR THE LAST 10 DAY HAVE BEEN AVERAGING  [phone] FOR TOP NUMBER AND THE BOTTOM IS STAYING CONSISTENT AT 75      ANY QUESTIONS OR CONCERN PLEASE CALL PATIENT

## 2023-04-24 ENCOUNTER — OFFICE VISIT (OUTPATIENT)
Dept: SLEEP MEDICINE | Facility: HOSPITAL | Age: 83
End: 2023-04-24
Payer: MEDICARE

## 2023-04-24 VITALS
DIASTOLIC BLOOD PRESSURE: 80 MMHG | BODY MASS INDEX: 29.12 KG/M2 | OXYGEN SATURATION: 98 % | HEIGHT: 72 IN | WEIGHT: 215 LBS | SYSTOLIC BLOOD PRESSURE: 137 MMHG | HEART RATE: 79 BPM

## 2023-04-24 DIAGNOSIS — G47.33 OBSTRUCTIVE SLEEP APNEA: Primary | ICD-10-CM

## 2023-04-24 DIAGNOSIS — E66.3 OVERWEIGHT (BMI 25.0-29.9): ICD-10-CM

## 2023-04-24 PROCEDURE — G0463 HOSPITAL OUTPT CLINIC VISIT: HCPCS

## 2023-04-24 NOTE — PROGRESS NOTES
[facility]  Telephone: [phone] / Fax: [phone] [place]  Telephone: [phone] / Fax: [phone] Carmela Collazo    PCP: Arminda Raymundo MD    Reason for visit: JHON f/u    Lucho Blank is a 82 y.o.male  was last seen at [facility] sleep lab in April 2022. He changed his mask style back to a full face mask and is doing well. His machine is set at pressure of 10-20cm H2O and appears comfortable. He wakes up feeling rested and denies snoring or apneas while on the device.  His sleep schedule is 9-10pm and awake time is 7-9am. ESS is 3. Interval history reviewed. He had a fall requiring hospitalization. Fortunately there were no fractures.  SH- no tobacco, 1 alc per week,     ROS- negative.    DME [facility]    Current Medications:    Current Outpatient Medications:   •  aspirin 81 MG tablet, Take 1 tablet by mouth daily., Disp: , Rfl:   •  atorvastatin (LIPITOR) 20 MG tablet, TAKE 1 TABLET DAILY (NEED APPOINTMENT WITH NEW PRIMARY CARE PHYSICIAN FOR ADDITIONAL REFILLS), Disp: 90 tablet, Rfl: 3  •  Calcium Carbonate (CALCIUM 500 PO), Take 1 tablet by mouth Daily., Disp: , Rfl:   •  Coenzyme Q10 200 MG tablet, Take 1 tablet by mouth daily., Disp: , Rfl:   •  cycloSPORINE (Restasis) 0.05 % ophthalmic emulsion, Administer 1 drop to both eyes Every 12 (Twelve) Hours., Disp: 180 each, Rfl: 3  •  escitalopram (LEXAPRO) 20 MG tablet, Take 1 tablet by mouth Daily., Disp: 90 tablet, Rfl: 3  •  lidocaine (LIDODERM) 5 %, Place 1 patch on the skin as directed by provider Daily. Remove & Discard patch within 12 hours or as directed by MD, Disp: 6 each, Rfl: 0  •  loratadine (CLARITIN) 10 MG tablet, TAKE 1 TABLET DAILY, Disp: 90 tablet, Rfl: 3  •  melatonin 5 MG tablet tablet, Take 5 mg by mouth At Night As Needed., Disp: , Rfl:   •  Multiple Vitamin (MULTIVITAMIN) capsule, Take 1 capsule by mouth daily., Disp: , Rfl:   •  omeprazole (priLOSEC) 40 MG capsule, TAKE 1 CAPSULE DAILY, Disp: 90 capsule, Rfl: 3  •   "tamsulosin (FLOMAX) 0.4 MG capsule 24 hr capsule, Take 1 capsule by mouth Daily., Disp: 90 capsule, Rfl: 1  •  valsartan (Diovan) 80 MG tablet, Take 1 tablet by mouth Daily., Disp: 90 tablet, Rfl: 0  •  Vascepa 1 g capsule capsule, TAKE 1 CAPSULE TWICE A DAY WITH MEALS, Disp: 360 capsule, Rfl: 3  •  Vitamin D, Cholecalciferol, 25 MCG (1000 UT) capsule, Take 1 capsule by mouth Daily., Disp: 90 capsule, Rfl: 0   also entered in Sleep Questionnaire    Patient  has a past medical history of Allergic, Anxiety, Anxiety disorder, Cancer, Cervical pain, Chest pain, 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    I have reviewed the Past Medical History, Past Surgical History, Social History and Family History.    Vital Signs /80   Pulse 79   Ht 182.9 cm (72\")   Wt 97.5 kg (215 lb)   SpO2 98%   BMI 29.16 kg/m²  Body mass index is 29.16 kg/m².    General Alert and oriented. No acute distress noted   Pharynx/Throat Class   Mallampati airway, large tongue, no evidence of redundant lateral pharyngeal tissue. No oral lesions. No thrush. Moist mucous membranes.   Head Normocephalic. Symmetrical. Atraumatic.    Nose No septal deviation. No drainage   Chest Wall Normal shape. Symmetric expansion with respiration. No tenderness.   Neck Trachea midline, no thyromegaly or adenopathy    Lungs Clear to auscultation bilaterally. No wheezes. No rhonchi. No rales. Respirations regular, even and unlabored.   Heart Regular rhythm and normal rate. Normal S1 and S2. No murmur   Abdomen Soft, non-tender and non-distended. Normal bowel sounds. No masses.   Extremities Moves all extremities well. No edema   Psychiatric Normal mood and affect.     Testing:  · Download " 1/21/23-4/20/23 94% use with avg 8 hours and 24 min, avg pr is 14.4cm H2O, AHI 2.5.    Study:  • 6/10/18  Overnight split polysomnogram study.  Diagnostic from 10:27 PM to 12:59 AM.  Sleep efficiency 51.9% which is poor.  Total sleep time 1.32 hours.  There was absence of slow-wave and REM sleep on the diagnostic study.  AHI index 19.7 with RDI 30.3.  Patient slept in supine position for the entire diagnostic portion.  REM stage sleep was not seen and therefore severity underestimated.  Oxygen saturation remained above 88%.  Arousal index 36 events an hour.  PLM index is not increased.  Patient had 36.66% time snoring.     Titration portion from 12:59 AM to 5:30 AM.  Sleep efficiency was again very poor at 56.9%.  Some improvement in slow-wave and REM sleep to 7% each.  AHI index was improved.  There are indicators for severe in rem stage sleep.  CPAP increased from 5-10 cm water pressure stopped maximum sleep noted at CPAP pressure 10 cm.  There was some stage REM sleep at 8 cm water pressure and AHI index is increased because of this.  Patient may require pressures slightly higher than 10 cm.       Impression:  1. Obstructive sleep apnea    2. Overweight (BMI 25.0-29.9)          Plan:  Patient uses the CPAP device and benefits from its use in terms of reduction of hypersomnia and snoring.  AHI appears to be within adequate range. I reviewed download report and original sleep study report with the patient.  Machine will be 5 years old this summer. He would like to get a new machine when this machine is over 5 years old. I have written the order and will have staff fax it to [facility] to get him a new device this summer. RTC in Oct 2023 to eval response to new machine.      Weight loss will be strongly beneficial to reduce the severity of sleep-disordered breathing.  Caution during activities that require prolonged concentration is strongly advised if sleepiness returns. Changing of PAP supplies regularly is important for  effective use.     Follow up with Dr. Raymundo in October 2023    Thank you for allowing me to participate in your patient's care.      THERESA Chacon  [facility]  Phone: [phone]      Part of this note may be an electronic transcription/translation of spoken language to printed text using the Dragon Dictation System.

## 2023-06-06 ENCOUNTER — TELEPHONE (OUTPATIENT)
Dept: FAMILY MEDICINE CLINIC | Facility: CLINIC | Age: 83
End: 2023-06-06

## 2023-06-06 ENCOUNTER — OFFICE VISIT (OUTPATIENT)
Dept: FAMILY MEDICINE CLINIC | Facility: CLINIC | Age: 83
End: 2023-06-06
Payer: MEDICARE

## 2023-06-06 VITALS
SYSTOLIC BLOOD PRESSURE: 125 MMHG | OXYGEN SATURATION: 97 % | DIASTOLIC BLOOD PRESSURE: 70 MMHG | TEMPERATURE: 97.1 F | HEIGHT: 72 IN | BODY MASS INDEX: 28.65 KG/M2 | WEIGHT: 211.5 LBS | HEART RATE: 65 BPM

## 2023-06-06 DIAGNOSIS — E78.5 HYPERLIPIDEMIA, UNSPECIFIED HYPERLIPIDEMIA TYPE: ICD-10-CM

## 2023-06-06 DIAGNOSIS — I10 ESSENTIAL HYPERTENSION: Primary | ICD-10-CM

## 2023-06-06 DIAGNOSIS — I10 ESSENTIAL HYPERTENSION: ICD-10-CM

## 2023-06-06 DIAGNOSIS — R91.1 LUNG NODULE: ICD-10-CM

## 2023-06-06 DIAGNOSIS — Z00.00 MEDICARE ANNUAL WELLNESS VISIT, SUBSEQUENT: Primary | ICD-10-CM

## 2023-06-06 NOTE — TELEPHONE ENCOUNTER
Inquire if lab orders are needed prior to appt 12/7/23- 6 mo f/u. I will call pt once orders are submitted

## 2023-06-06 NOTE — PROGRESS NOTES
The ABCs of the Annual Wellness Visit  Subsequent Medicare Wellness Visit    Subjective    Lucho Blank is a 82 y.o. male who presents for a Subsequent Medicare Wellness Visit.    The following portions of the patient's history were reviewed and   updated as appropriate: allergies, current medications, past family history, past medical history, past social history, past surgical history, and problem list.    Compared to one year ago, the patient feels his physical   health is better.    Compared to one year ago, the patient feels his mental   health is the same.    Recent Hospitalizations:  He was not admitted to the hospital during the last year.       Current Medical Providers:  Patient Care Team:  Arminda Raymundo MD as PCP - General (Family Medicine)  Tashi Alonzo MD as Consulting Physician (Ophthalmology)  Camilla Billy MD as Consulting Physician (Dermatology)  Johnie Chan Jr., MD as Consulting Physician (Urology)  Ellie Beltran (Psychology)    Outpatient Medications Prior to Visit   Medication Sig Dispense Refill    atorvastatin (LIPITOR) 20 MG tablet TAKE 1 TABLET DAILY (NEED APPOINTMENT WITH NEW PRIMARY CARE PHYSICIAN FOR ADDITIONAL REFILLS) 90 tablet 3    Calcium Carbonate (CALCIUM 500 PO) Take 1 tablet by mouth Daily.      escitalopram (LEXAPRO) 20 MG tablet Take 1 tablet by mouth Daily. 90 tablet 3    loratadine (CLARITIN) 10 MG tablet TAKE 1 TABLET DAILY 90 tablet 3    melatonin 5 MG tablet tablet Take 1 tablet by mouth At Night As Needed.      Multiple Vitamin (MULTIVITAMIN) capsule Take 1 capsule by mouth Daily.      omeprazole (priLOSEC) 40 MG capsule TAKE 1 CAPSULE DAILY 90 capsule 3    tamsulosin (FLOMAX) 0.4 MG capsule 24 hr capsule Take 1 capsule by mouth Daily. 90 capsule 1    valsartan (Diovan) 80 MG tablet Take 1 tablet by mouth Daily. 90 tablet 0    Vascepa 1 g capsule capsule TAKE 1 CAPSULE TWICE A DAY WITH MEALS 360 capsule 3    Vitamin D, Cholecalciferol, 25 MCG (1000 UT)  "capsule Take 1 capsule by mouth Daily. 90 capsule 0    aspirin 81 MG tablet Take 1 tablet by mouth Daily.      Coenzyme Q10 200 MG tablet Take 1 tablet by mouth daily.      cycloSPORINE (Restasis) 0.05 % ophthalmic emulsion Administer 1 drop to both eyes Every 12 (Twelve) Hours. 180 each 3    lidocaine (LIDODERM) 5 % Place 1 patch on the skin as directed by provider Daily. Remove & Discard patch within 12 hours or as directed by MD 6 each 0     No facility-administered medications prior to visit.       No opioid medication identified on active medication list. I have reviewed chart for other potential  high risk medication/s and harmful drug interactions in the elderly.        Aspirin is on active medication list. Aspirin use is indicated based on review of current medical condition/s. Pros and cons of this therapy have been discussed today. Benefits of this medication outweigh potential harm.  Patient has been encouraged to continue taking this medication.  .      Patient Active Problem List   Diagnosis    Encounter for screening for malignant neoplasm of colon    Polyuria     Advance Care Planning   Advance Care Planning     Advance Directive is on file.  ACP discussion was held with the patient during this visit. Patient has an advance directive in EMR which is still valid.      Objective    Vitals:    06/06/23 1342   BP: 125/70   Pulse: 65   Temp: 97.1 °F (36.2 °C)   TempSrc: Temporal   SpO2: 97%   Weight: 95.9 kg (211 lb 8 oz)   Height: 182.9 cm (72.01\")     Estimated body mass index is 28.68 kg/m² as calculated from the following:    Height as of this encounter: 182.9 cm (72.01\").    Weight as of this encounter: 95.9 kg (211 lb 8 oz).    BMI is >= 25 and <30. (Overweight) The following options were offered after discussion;: exercise counseling/recommendations and nutrition counseling/recommendations      Does the patient have evidence of cognitive impairment? No    Lab Results   Component Value Date    CHLPL " 111 2023    TRIG 162 (H) 2023    HDL 29 (L) 2023    LDL 54 2023    VLDL 28 2023        HEALTH RISK ASSESSMENT    Smoking Status:  Social History     Tobacco Use   Smoking Status Never   Smokeless Tobacco Never     Alcohol Consumption:  Social History     Substance and Sexual Activity   Alcohol Use Yes    Alcohol/week: 1.0 standard drink    Types: 1 Drinks containing 0.5 oz of alcohol per week    Comment: No problems     Fall Risk Screen:    [place] Fall Risk Assessment was completed, and patient is at LOW risk for falls.Assessment completed on:2023    Depression Screening:       No data to display                Health Habits and Functional and Cognitive Screenin/6/2023     1:38 PM   Functional & Cognitive Status   Do you have difficulty preparing food and eating? No   Do you have difficulty bathing yourself, getting dressed or grooming yourself? No   Do you have difficulty using the toilet? No   Do you have difficulty moving around from place to place? No   Do you have trouble with steps or getting out of a bed or a chair? No   Current Diet Well Balanced Diet   Dental Exam Up to date   Eye Exam Up to date   Exercise (times per week) 3 times per week   Current Exercises Include Walking   Do you need help using the phone?  No   Are you deaf or do you have serious difficulty hearing?  No   Do you need help with transportation? No   Do you need help shopping? No   Do you need help preparing meals?  No   Do you need help with housework?  No   Do you need help with laundry? No   Do you need help taking your medications? No   Do you need help managing money? No   Do you ever drive or ride in a car without wearing a seat belt? No   Have you felt unusual stress, anger or loneliness in the last month? No   If you need help, do you have trouble finding someone available to you? No   Have you been bothered in the last four weeks by sexual problems? No   Do you have difficulty  concentrating, remembering or making decisions? Yes       Age-appropriate Screening Schedule:  Refer to the list below for future screening recommendations based on patient's age, sex and/or medical conditions. Orders for these recommended tests are listed in the plan section. The patient has been provided with a written plan.    Health Maintenance   Topic Date Due    COVID-19 Vaccine (5 - Pfizer series) 07/15/2022    INFLUENZA VACCINE  08/01/2023    LIPID PANEL  06/01/2024    ANNUAL WELLNESS VISIT  06/06/2024    TDAP/TD VACCINES (2 - Td or Tdap) 02/04/2029    COLORECTAL CANCER SCREENING  09/01/2031    Pneumococcal Vaccine 65+  Completed    ZOSTER VACCINE  Completed                  CMS Preventative Services Quick Reference  Risk Factors Identified During Encounter  Immunizations Discussed/Encouraged: COVID19  The above risks/problems have been discussed with the patient.  Pertinent information has been shared with the patient in the After Visit Summary.  An After Visit Summary and PPPS were made available to the patient.    Follow Up:   Next Medicare Wellness visit to be scheduled in 1 year.       Additional E&M Note during same encounter follows:  Patient has multiple medical problems which are significant and separately identifiable that require additional work above and beyond the Medicare Wellness Visit.      Chief Complaint  Medicare Wellness-subsequent    Subjective        HPI  Lucho Blank is also being seen today for hypertension, hyperlipidemia and anxiety and depression.  Patient denies any headache, blurring of vision, lightheadedness or dizziness.  She is not checking her blood pressure at home.   History of hyperlipidemia on atorvastatin 20 mg a day denies any nausea vomiting abdominal pain or muscle pain tolerating the medication without any side effects  Anxiety depression stable on current doses.       Objective   Vital Signs:  /70   Pulse 65   Temp 97.1 °F (36.2 °C) (Temporal)   Ht 182.9  "cm (72.01\")   Wt 95.9 kg (211 lb 8 oz)   SpO2 97%   BMI 28.68 kg/m²     Physical Exam  Constitutional:       General: He is not in acute distress.     Appearance: Normal appearance. He is well-developed.   HENT:      Head: Normocephalic and atraumatic.      Right Ear: Tympanic membrane normal.      Left Ear: Tympanic membrane normal.      Mouth/Throat:      Mouth: Mucous membranes are moist.   Eyes:      General:         Right eye: No discharge.         Left eye: No discharge.      Extraocular Movements: Extraocular movements intact.      Pupils: Pupils are equal, round, and reactive to light.   Cardiovascular:      Rate and Rhythm: Normal rate and regular rhythm.      Pulses: Normal pulses.      Heart sounds: Normal heart sounds.   Pulmonary:      Effort: Pulmonary effort is normal.      Breath sounds: Normal breath sounds. No wheezing or rales.   Abdominal:      General: Bowel sounds are normal.      Palpations: Abdomen is soft. There is no mass.      Tenderness: There is no abdominal tenderness.   Musculoskeletal:      Cervical back: Normal range of motion and neck supple.      Right lower leg: No edema.      Left lower leg: No edema.   Lymphadenopathy:      Cervical: No cervical adenopathy.   Neurological:      General: No focal deficit present.      Mental Status: He is alert and oriented to person, place, and time.   Psychiatric:         Mood and Affect: Mood normal.         Behavior: Behavior normal.                       Assessment and Plan   Diagnoses and all orders for this visit:    1. Medicare annual wellness visit, subsequent (Primary)    2. Essential hypertension    3. Hyperlipidemia, unspecified hyperlipidemia type    4. Lung nodule        Lucho Blank is a 82-year-old male patient seen today for  Medicare annual wellness visit, patient lives in independent living able to do activities of daily living he assist still driving short distances.  He has a good family support.  Lab results discussed " with patient.  Elevated PSA denies any urinary problems seeing urology.  He is also seen today for  Hypertension blood pressure is at goal continue same  Hyperlipidemia lab results discussed with patient continue same  Lung nodule he has a lung nodule on CT scan in 2021 but had the chest x-ray done last year and there was no nodule I talked with patient about doing a follow-up CT scan.  Patient does not want to do annual CT scan.  Will not schedule him for CT scan.         Follow Up   No follow-ups on file.  Patient was given instructions and counseling regarding his condition or for health maintenance advice. Please see specific information pulled into the AVS if appropriate.

## 2023-07-26 RX ORDER — OMEPRAZOLE 40 MG/1
CAPSULE, DELAYED RELEASE ORAL
Qty: 90 CAPSULE | Refills: 3 | Status: SHIPPED | OUTPATIENT
Start: 2023-07-26

## 2023-10-04 ENCOUNTER — TELEPHONE (OUTPATIENT)
Dept: FAMILY MEDICINE CLINIC | Facility: CLINIC | Age: 83
End: 2023-10-04

## 2023-10-04 NOTE — TELEPHONE ENCOUNTER
Caller: Lucho Blank    Relationship: Self    Best call back number: [phone]     Who are you requesting to speak with (clinical staff, provider,  specific staff member):     What was the call regarding: WANTING TO KNOW IF ITS OK TO GET THE RSV VACCINE PLEASE CALL AND ADVISE

## 2023-10-04 NOTE — TELEPHONE ENCOUNTER
"Relay     \"Dr. Raymundo agrees that pt should receive the RSV vaccine.  When you do get the vaccine at your pharmacy, make sure they fax us with that information so that we can keep your chart up-to-date.\"          "

## 2023-10-04 NOTE — TELEPHONE ENCOUNTER
Called and LVM for pt informing him that his provider agrees that he should receive the RSV vaccine.

## 2023-10-24 ENCOUNTER — OFFICE VISIT (OUTPATIENT)
Dept: SLEEP MEDICINE | Facility: HOSPITAL | Age: 83
End: 2023-10-24
Payer: MEDICARE

## 2023-10-24 ENCOUNTER — OFFICE VISIT (OUTPATIENT)
Dept: NEUROLOGY | Facility: CLINIC | Age: 83
End: 2023-10-24
Payer: MEDICARE

## 2023-10-24 VITALS
BODY MASS INDEX: 29.12 KG/M2 | HEART RATE: 87 BPM | DIASTOLIC BLOOD PRESSURE: 64 MMHG | HEIGHT: 72 IN | WEIGHT: 215 LBS | OXYGEN SATURATION: 96 % | SYSTOLIC BLOOD PRESSURE: 123 MMHG

## 2023-10-24 VITALS
WEIGHT: 215.8 LBS | BODY MASS INDEX: 29.23 KG/M2 | DIASTOLIC BLOOD PRESSURE: 72 MMHG | SYSTOLIC BLOOD PRESSURE: 138 MMHG | HEIGHT: 72 IN | OXYGEN SATURATION: 97 % | HEART RATE: 73 BPM

## 2023-10-24 DIAGNOSIS — F33.0 MILD RECURRENT MAJOR DEPRESSION: Primary | ICD-10-CM

## 2023-10-24 DIAGNOSIS — E66.3 OVERWEIGHT (BMI 25.0-29.9): ICD-10-CM

## 2023-10-24 DIAGNOSIS — R41.3 MEMORY LOSS: ICD-10-CM

## 2023-10-24 DIAGNOSIS — G47.33 OBSTRUCTIVE SLEEP APNEA: Primary | ICD-10-CM

## 2023-10-24 PROCEDURE — G0463 HOSPITAL OUTPT CLINIC VISIT: HCPCS

## 2023-10-24 PROCEDURE — 1160F RVW MEDS BY RX/DR IN RCRD: CPT | Performed by: NURSE PRACTITIONER

## 2023-10-24 PROCEDURE — 1159F MED LIST DOCD IN RCRD: CPT | Performed by: NURSE PRACTITIONER

## 2023-10-24 PROCEDURE — 99213 OFFICE O/P EST LOW 20 MIN: CPT | Performed by: NURSE PRACTITIONER

## 2023-10-24 RX ORDER — METRONIDAZOLE 7.5 MG/G
LOTION TOPICAL
COMMUNITY
Start: 2023-09-06

## 2023-10-24 RX ORDER — ICOSAPENT ETHYL 500 MG/1
CAPSULE ORAL
COMMUNITY
Start: 2015-01-01

## 2023-10-24 RX ORDER — ESCITALOPRAM OXALATE 20 MG/1
20 TABLET ORAL DAILY
Qty: 90 TABLET | Refills: 3 | Status: SHIPPED | OUTPATIENT
Start: 2023-10-24

## 2023-10-24 NOTE — PROGRESS NOTES
[facility] SLEEP MEDICINE  [address] [facility]  [address]  [facility] [id number] [phone]    PCP: Arminda Raymundo MD    Reason for visit:  Sleep disorders: JHON    Lucho is a 83 y.o.male who was seen in the [facility] today. 6 month fu. He is doing well with CPAP. Sleeps from 9:30pm to 8am. Using FFM. Fits well. Wakes up rested. No air leaks or dry mouth.  Halbur Sleepiness Scale is 2. Caffeine 1 per day. Alcohol 1 per week.    Lucho  reports that he has never smoked. He has never used smokeless tobacco.    Pertinent Positive Review of Systems of denies  Rest of Review of Systems was negative as recorded in Sleep Questionnaire.    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     Current Medications:    Current Outpatient Medications:     atorvastatin (LIPITOR) 20 MG tablet, TAKE 1 TABLET DAILY (NEED APPOINTMENT WITH NEW PRIMARY CARE PHYSICIAN FOR ADDITIONAL REFILLS), Disp: 90 tablet, Rfl: 3    Calcium Carbonate (CALCIUM 500 PO), Take 1 tablet by mouth Daily., Disp: , Rfl:     escitalopram (LEXAPRO) 20 MG tablet, Take 1 tablet by mouth Daily., Disp: 90 tablet, Rfl: 3    Icosapent Ethyl 0.5 g capsule, , Disp: , Rfl:     loratadine (CLARITIN) 10 MG tablet, TAKE 1 TABLET DAILY, Disp: 90 tablet, Rfl: 3    melatonin 5 MG tablet tablet, Take 1 tablet by mouth At Night As Needed., Disp: , Rfl:     metroNIDAZOLE (FLAGYL) 0.75 % lotion lotion, , Disp: , Rfl:     Multiple Vitamin (MULTIVITAMIN) capsule, Take 1 capsule by mouth Daily., Disp: , Rfl:     omeprazole (priLOSEC) 40 MG capsule, TAKE 1 CAPSULE DAILY, Disp: 90  "capsule, Rfl: 3    tamsulosin (FLOMAX) 0.4 MG capsule 24 hr capsule, Take 1 capsule by mouth Daily., Disp: 90 capsule, Rfl: 1    valsartan (Diovan) 80 MG tablet, Take 1 tablet by mouth Daily., Disp: 90 tablet, Rfl: 0    Vascepa 1 g capsule capsule, TAKE 1 CAPSULE TWICE A DAY WITH MEALS, Disp: 360 capsule, Rfl: 3    Vitamin D, Cholecalciferol, 25 MCG (1000 UT) capsule, Take 1 capsule by mouth Daily., Disp: 90 capsule, Rfl: 0   also entered in Sleep Questionnaire         Vital Signs: /64   Pulse 87   Ht 182.9 cm (72.01\")   Wt 97.5 kg (215 lb)   SpO2 96%   BMI 29.15 kg/m²     Body mass index is 29.15 kg/m².       Tongue: Large       Dentition: good       Pharynx: Posterior pharyngeal pillars are narrow   Mallampatti: III (soft and hard palate and base of uvula visible)        General: Alert. Cooperative. Well developed. No acute distress.             Head:  Normocephalic. Symmetrical. Atraumatic.              Nose: No septal deviation. No drainage.          Throat: No oral lesions. No thrush. Moist mucous membranes.    Chest Wall:  Normal shape. Symmetric expansion with respiration. No tenderness.             Neck:  Trachea midline.           Lungs:  Clear to auscultation bilaterally. No wheezes. No rhonchi. No rales. Respirations regular, even and unlabored.            Heart:  Regular rhythm and normal rate. Normal S1 and S2. No murmur.     Abdomen:  Soft, non-tender and non-distended. Normal bowel sounds. No masses.  Extremities:  Moves all extremities well. No edema.    Psychiatric: Normal mood and affect.    Diagnostic data available to date is as below and was reviewed on current visit:  6/10/18  Overnight split polysomnogram study.  Diagnostic from 10:27 PM to 12:59 AM.  Sleep efficiency 51.9% which is poor.  Total sleep time 1.32 hours.  There was absence of slow-wave and REM sleep on the diagnostic study.  AHI index 19.7 with RDI 30.3.  Patient slept in supine position for the entire diagnostic " "portion.  REM stage sleep was not seen and therefore severity underestimated.  Oxygen saturation remained above 88%.  Arousal index 36 events an hour.  PLM index is not increased.  Patient had 36.66% time snoring.  Titration portion from 12:59 AM to 5:30 AM.  Sleep efficiency was again very poor at 56.9%.  Some improvement in slow-wave and REM sleep to 7% each.  AHI index was improved.  There are indicators for severe in rem stage sleep.  CPAP increased from 5-10 cm water pressure stopped maximum sleep noted at CPAP pressure 10 cm.  There was some stage REM sleep at 8 cm water pressure and AHI index is increased because of this.  Patient may require pressures slightly higher than 10 cm.  Please note the patient notes regarding big oral air leak late in the study.    No results found for: \"IRON\", \"TIBC\", \"FERRITIN\"    Most current available usage data reviewed on 10/24/2023:  No scans are attached to the encounter or orders placed in the encounter.  93% compliance average 8 hours 51 minutes AHI 4.6 average pressure 14.7 auto CPAP 10-20    DME Company: [employer]    Prescription to [facility] for replacement supplies as below:    full face mask      Description Replacement    Nasal PILLOWS      A 7034 Nasal Pillows  every 3 mth    A 7033 Repl Nasal Pillows  2 per mth    Nasal MASK/CUSHION      A 7034 Nasal Mask/Cushion  every 3 mth    A 7032 Repl Nasal Mask/Cushion  2 per mth    Full Face MASK     x A 7030 Full Face Mask  every 3 mth   x A 7031 Repl Face Mask  1 per mth      A 4604 Heated Tubing  every 3 mth    A 7037 Standard Tubing  every 3 mth   x A 7035 Headgear  every 3 mth   x A 7046 Repl Humidifier Chamber  every 6 yrs   x A 7038 Disposable Filters  2 per mth   x A 7039 Non-disposable Filter  every 6 mth   x A 7036 Chin Strap  every 6 mth     No orders of the defined types were placed in this encounter.         Impression:  1. Obstructive sleep apnea    2. Overweight (BMI 25.0-29.9)        Plan:  Lucho is doing well on " current pr and will keep same. He can replace device any time but working well for now.  He was reminded to change his supplies regularly.  No interval health issues reported    I reiterated the importance of effective treatment of obstructive sleep apnea with PAP machine.  Cardiovascular health risks of untreated sleep apnea were again reviewed.  Patient was asked to remain cautious if there is persistent hypersomnolence. The benefit of weight loss in reducing severity of obstructive sleep apnea was discussed.  Patient would benefit from adhering to a strict diet to achieve ideal BMI.     Change of PAP supplies regularly is important for effective use.  Change of cushion on the mask or plugs on nasal pillows along with disposable filters once every month and change of mask frame, tubing, headgear and Velcro straps every 6 months at the minimum was reiterated.    This patient is compliant with PAP machine and benefits from its use.  Apnea hypopneas index is corrected/improved.  Daytime hypersomnolence has resolved.     Patient will follow up in this clinic in 1 year aprn    Thank you for allowing me to participate in your patient's care.    Electronically signed by Nick Raymundo MD, 10/24/23, 3:05 PM EDT.    Part of this note may be an electronic transcription/translation of spoken language to printed text using the Dragon Dictation System.

## 2023-10-24 NOTE — LETTER
October 24, 2023       No Recipients    Patient: Lucho Blank   YOB: 1940   Date of Visit: 10/24/2023     Dear Arminda Raymundo MD:       Thank you for referring Lucho Blank to me for evaluation. Below are the relevant portions of my assessment and plan of care.    If you have questions, please do not hesitate to call me. I look forward to following Lucho along with you.         Sincerely,        THERESA Dyer        CC:   No Recipients    Lester THERESA Hayden  10/24/23 1322  Sign when Signing Visit  Subjective   Lucho Blank is a 83 y.o. male presenting for follow up for memory loss and depression. When I saw him initially I referred him for neuropsychology testing which revealed major depressive disorder and some mild deficits with immediate and delayed memory. He was started on Lexapro 20 mg daily and this has helped quite a bit. He is doing well. He does report some slightly trouble with his memory over the last year but continues to live alone, drives, performs all ADLs. Enjoys watching his granddaughter play soccer. She played for [employer] and then [employer].     Memory Loss  Pertinent negatives include no numbness or weakness.      Review of Systems   Respiratory:  Positive for apnea.    Neurological:  Positive for memory problem. Negative for dizziness, tremors, seizures, syncope, facial asymmetry, speech difficulty, weakness, light-headedness, numbness, headache and confusion.   Psychiatric/Behavioral:  Negative for sleep disturbance. The patient is not nervous/anxious.      I have reviewed and confirmed the accuracy of the patient's history: Chief complaint, HPI, ROS, Subjective, and Past Family Social History as entered by the MA/COLEMAN/RN. Laine Hester MA 10/24/23      Objective     Physical Examination:  General Appearance:  Well developed, well nourished, well groomed, alert, and cooperative.  HEENT: Normocephalic.    Neck and Spine: Normal range of motion.  Normal  alignment. No mass or tenderness. No bruits.  Cardiac: Regular rate and rhythm. No murmurs.  Peripheral Vasculature: Radial and pedal pulses are equal and symmetric.   Extremities: No edema or deformities. Normal joint ROM.  Skin: No rashes or birth marks.      Neurological examination:   Higher Integrative Function: Oriented to time, place, and person. Scored a 25/30 on the MOCA. Missed 4/5 on delayed recall and one naming animal.   CN II: Pupils are equal, round and reactive to light. Normal visual acuity and visual fields.   CN III IV VI: Extraocular movements are full without nystagmus.   CN V: Normal facial sensation and strength of muscles of mastication.   CN VII: Facial movements are symmetric. No weakness.   CN VIII: Auditory acuity is normal.  CN IX & X: Symmetric palatal movement.   CN XI: Sternocleidomastoid and trapezius are normal. No weakness.   CN XII: The tongue is midline. No atrophy or fasciculations.  Motor: Normal muscle strength, bulk and tone in upper and lower extremities. No fasciculations, rigidity, spasticity, or abnormal movements.   Reflexes: 2+ in the upper and lower extremities. Plantar responses are flexor.   Sensation: Normal light touch, pinprick, vibration, temperature, and proprioception in the arms and legs.   Station and Gait: Normal gait and station.   Coordination: Finger to nose test shows no dysmetria. Rapid alternating movements are normal. Heel to shin is normal.           Assessment & Plan   Diagnoses and all orders for this visit:    1. Mild recurrent major depression (Primary)    2. Memory loss    Other orders  -     escitalopram (LEXAPRO) 20 MG tablet; Take 1 tablet by mouth Daily.  Dispense: 90 tablet; Refill: 3    Doing well. Mood is good. Continue Lexapro 20 mg daily. Will call with any worsening symptoms. F/u prn.

## 2023-10-24 NOTE — PROGRESS NOTES
Subjective   Lucho Blank is a 83 y.o. male presenting for follow up for memory loss and depression. When I saw him initially I referred him for neuropsychology testing which revealed major depressive disorder and some mild deficits with immediate and delayed memory. He was started on Lexapro 20 mg daily and this has helped quite a bit. He is doing well. He does report some slightly trouble with his memory over the last year but continues to live alone, drives, performs all ADLs. Enjoys watching his granddaughter play soccer. She played for [employer] and then [employer].     Memory Loss  Pertinent negatives include no numbness or weakness.      Review of Systems   Respiratory:  Positive for apnea.    Neurological:  Positive for memory problem. Negative for dizziness, tremors, seizures, syncope, facial asymmetry, speech difficulty, weakness, light-headedness, numbness, headache and confusion.   Psychiatric/Behavioral:  Negative for sleep disturbance. The patient is not nervous/anxious.      I have reviewed and confirmed the accuracy of the patient's history: Chief complaint, HPI, ROS, Subjective, and Past Family Social History as entered by the MA/LPN/RN. Laine Hester MA 10/24/23      Objective     Physical Examination:  General Appearance:  Well developed, well nourished, well groomed, alert, and cooperative.  HEENT: Normocephalic.    Neck and Spine: Normal range of motion.  Normal alignment. No mass or tenderness. No bruits.  Cardiac: Regular rate and rhythm. No murmurs.  Peripheral Vasculature: Radial and pedal pulses are equal and symmetric.   Extremities: No edema or deformities. Normal joint ROM.  Skin: No rashes or birth marks.      Neurological examination:   Higher Integrative Function: Oriented to time, place, and person. Scored a 25/30 on the MOCA. Missed 4/5 on delayed recall and one naming animal.   CN II: Pupils are equal, round and reactive to light. Normal visual acuity and visual fields.   CN III IV  VI: Extraocular movements are full without nystagmus.   CN V: Normal facial sensation and strength of muscles of mastication.   CN VII: Facial movements are symmetric. No weakness.   CN VIII: Auditory acuity is normal.  CN IX & X: Symmetric palatal movement.   CN XI: Sternocleidomastoid and trapezius are normal. No weakness.   CN XII: The tongue is midline. No atrophy or fasciculations.  Motor: Normal muscle strength, bulk and tone in upper and lower extremities. No fasciculations, rigidity, spasticity, or abnormal movements.   Reflexes: 2+ in the upper and lower extremities. Plantar responses are flexor.   Sensation: Normal light touch, pinprick, vibration, temperature, and proprioception in the arms and legs.   Station and Gait: Normal gait and station.   Coordination: Finger to nose test shows no dysmetria. Rapid alternating movements are normal. Heel to shin is normal.           Assessment & Plan   Diagnoses and all orders for this visit:    1. Mild recurrent major depression (Primary)    2. Memory loss    Other orders  -     escitalopram (LEXAPRO) 20 MG tablet; Take 1 tablet by mouth Daily.  Dispense: 90 tablet; Refill: 3    Doing well. Mood is good. Continue Lexapro 20 mg daily. Will call with any worsening symptoms. F/u prn.

## 2023-12-01 ENCOUNTER — TELEPHONE (OUTPATIENT)
Dept: FAMILY MEDICINE CLINIC | Facility: CLINIC | Age: 83
End: 2023-12-01
Payer: MEDICARE

## 2023-12-01 DIAGNOSIS — R53.83 FATIGUE, UNSPECIFIED TYPE: ICD-10-CM

## 2023-12-01 DIAGNOSIS — R97.20 ELEVATED PSA: ICD-10-CM

## 2023-12-01 DIAGNOSIS — R73.9 HYPERGLYCEMIA: ICD-10-CM

## 2023-12-01 DIAGNOSIS — E78.5 HYPERLIPIDEMIA, UNSPECIFIED HYPERLIPIDEMIA TYPE: ICD-10-CM

## 2023-12-01 DIAGNOSIS — I10 ESSENTIAL HYPERTENSION: Primary | ICD-10-CM

## 2023-12-01 NOTE — TELEPHONE ENCOUNTER
Caller: Lucho Blank    Relationship: Self    Best call back number: [phone]     What orders are you requesting (i.e. lab or imaging): ROUTINE LABS    In what timeframe would the patient need to come in: ASAP    Where will you receive your lab/imaging services: IN OFFICE    Additional notes: PLEASE CALL TO SCHEDULE ONCE ORDERS ARE IN.

## 2023-12-04 ENCOUNTER — TELEPHONE (OUTPATIENT)
Dept: FAMILY MEDICINE CLINIC | Facility: CLINIC | Age: 83
End: 2023-12-04

## 2023-12-07 ENCOUNTER — OFFICE VISIT (OUTPATIENT)
Dept: FAMILY MEDICINE CLINIC | Facility: CLINIC | Age: 83
End: 2023-12-07
Payer: MEDICARE

## 2023-12-07 VITALS
TEMPERATURE: 98.4 F | DIASTOLIC BLOOD PRESSURE: 78 MMHG | WEIGHT: 220.1 LBS | SYSTOLIC BLOOD PRESSURE: 118 MMHG | HEIGHT: 72 IN | BODY MASS INDEX: 29.81 KG/M2 | HEART RATE: 76 BPM | OXYGEN SATURATION: 96 %

## 2023-12-07 DIAGNOSIS — I10 ESSENTIAL HYPERTENSION: Primary | ICD-10-CM

## 2023-12-07 DIAGNOSIS — R73.9 HYPERGLYCEMIA: ICD-10-CM

## 2023-12-07 DIAGNOSIS — E78.5 HYPERLIPIDEMIA, UNSPECIFIED HYPERLIPIDEMIA TYPE: ICD-10-CM

## 2023-12-07 PROCEDURE — 99213 OFFICE O/P EST LOW 20 MIN: CPT | Performed by: FAMILY MEDICINE

## 2023-12-07 NOTE — PROGRESS NOTES
"Chief Complaint  Follow-up (6 months), Hypertension, and Hyperlipidemia (Questions on readings; possible medication or diet changes)    Subjective        Lucho Blank presents to [facility]  History of Present Illness for follow-up on hypertension hyperlipidemia and GERD.  Patient denies any headache, blurring of vision, lightheadedness or dizziness.  She is not checking her blood pressure at home.   Patient has long history of hyperlipidemia denies any body ache, nausea vomiting.  Denies any problem with medication.     Objective   Vital Signs:  /78   Pulse 76   Temp 98.4 °F (36.9 °C) (Temporal)   Ht 182.9 cm (72.01\")   Wt 99.8 kg (220 lb 1.6 oz)   SpO2 96%   BMI 29.84 kg/m²   Estimated body mass index is 29.84 kg/m² as calculated from the following:    Height as of this encounter: 182.9 cm (72.01\").    Weight as of this encounter: 99.8 kg (220 lb 1.6 oz).               Physical Exam  Constitutional:       General: He is not in acute distress.     Appearance: Normal appearance. He is well-developed.   HENT:      Head: Normocephalic and atraumatic.      Right Ear: Tympanic membrane normal.      Left Ear: Tympanic membrane normal.      Mouth/Throat:      Mouth: Mucous membranes are moist.   Eyes:      General:         Right eye: No discharge.         Left eye: No discharge.      Extraocular Movements: Extraocular movements intact.      Pupils: Pupils are equal, round, and reactive to light.   Cardiovascular:      Rate and Rhythm: Normal rate and regular rhythm.      Pulses: Normal pulses.      Heart sounds: Normal heart sounds.   Pulmonary:      Effort: Pulmonary effort is normal.      Breath sounds: Normal breath sounds. No wheezing or rales.   Abdominal:      General: Bowel sounds are normal.      Palpations: Abdomen is soft. There is no mass.      Tenderness: There is no abdominal tenderness.   Musculoskeletal:      Cervical back: Normal range of motion and neck supple. "      Right lower leg: No edema.      Left lower leg: No edema.   Lymphadenopathy:      Cervical: No cervical adenopathy.   Neurological:      General: No focal deficit present.      Mental Status: He is alert and oriented to person, place, and time.        Result Review :    6               Assessment and Plan   Diagnoses and all orders for this visit:    1. Essential hypertension (Primary)    2. Hyperlipidemia, unspecified hyperlipidemia type  -     Comprehensive Metabolic Panel; Future  -     Lipid Panel; Future    3. Hyperglycemia      Lucho Blank is a 83-year-old male patient seen today for  Hypertension, blood pressure at goal continue same.  Lab reviewed with him   Hyperlipidemia, HDL has improved from last time stable LDL at goal continue same  Elevated PSA, history of BPH seeing urology               Follow Up   Return in about 6 months (around 6/7/2024) for COME 1 WK BEFORE FOR LABS ,PRIOR TO APPOINTMENT, Medicare Wellness.  Patient was given instructions and counseling regarding his condition or for health maintenance advice. Please see specific information pulled into the AVS if appropriate.         Answers submitted by the patient for this visit:  Primary Reason for Visit (Submitted on 11/30/2023)  What is the primary reason for your visit?: Physical

## 2023-12-15 DIAGNOSIS — N40.1 BENIGN PROSTATIC HYPERPLASIA WITH URINARY FREQUENCY: ICD-10-CM

## 2023-12-15 DIAGNOSIS — R35.0 BENIGN PROSTATIC HYPERPLASIA WITH URINARY FREQUENCY: ICD-10-CM

## 2023-12-15 RX ORDER — TAMSULOSIN HYDROCHLORIDE 0.4 MG/1
1 CAPSULE ORAL DAILY
Qty: 90 CAPSULE | Refills: 1 | Status: SHIPPED | OUTPATIENT
Start: 2023-12-15

## 2023-12-15 NOTE — TELEPHONE ENCOUNTER
Caller: Lucho Blank    Relationship: Self    Best call back number: [phone]     Requested Prescriptions:   Requested Prescriptions     Pending Prescriptions Disp Refills    tamsulosin (FLOMAX) 0.4 MG capsule 24 hr capsule 90 capsule 1     Sig: Take 1 capsule by mouth Daily.        Pharmacy where request should be sent: [facility] [address] [phone] [facility] [phone] FX     Last office visit with prescribing clinician: 12/7/2023   Last telemedicine visit with prescribing clinician: Visit date not found   Next office visit with prescribing clinician: 6/6/2024     Additional details provided by patient: PATIENT IS OUT OF MEDICATION.    Does the patient have less than a 3 day supply:  [x] Yes  [] No    Joshua Ogden Rep   12/15/23 15:59 EST

## 2024-03-25 RX ORDER — ICOSAPENT ETHYL 1000 MG/1
1 CAPSULE ORAL 2 TIMES DAILY WITH MEALS
Qty: 360 CAPSULE | Refills: 3 | Status: SHIPPED | OUTPATIENT
Start: 2024-03-25

## 2024-05-27 DIAGNOSIS — R35.0 BENIGN PROSTATIC HYPERPLASIA WITH URINARY FREQUENCY: ICD-10-CM

## 2024-05-27 DIAGNOSIS — N40.1 BENIGN PROSTATIC HYPERPLASIA WITH URINARY FREQUENCY: ICD-10-CM

## 2024-05-28 RX ORDER — TAMSULOSIN HYDROCHLORIDE 0.4 MG/1
1 CAPSULE ORAL DAILY
Qty: 90 CAPSULE | Refills: 3 | Status: SHIPPED | OUTPATIENT
Start: 2024-05-28

## 2024-05-28 NOTE — TELEPHONE ENCOUNTER
Rx Refill Note  Requested Prescriptions     Pending Prescriptions Disp Refills    tamsulosin (FLOMAX) 0.4 MG capsule 24 hr capsule [Pharmacy Med Name: TAMSULOSIN HCL CAPS 0.4MG] 90 capsule 3     Sig: TAKE 1 CAPSULE DAILY      Last office visit with prescribing clinician: 12/7/2023   Last telemedicine visit with prescribing clinician: Visit date not found   Next office visit with prescribing clinician: 6/6/2024                         Would you like a call back once the refill request has been completed: [] Yes [] No    If the office needs to give you a call back, can they leave a voicemail: [] Yes [] No    Joshua Schmitz Rep  05/28/24, 10:01 EDT

## 2024-06-06 ENCOUNTER — OFFICE VISIT (OUTPATIENT)
Dept: FAMILY MEDICINE CLINIC | Facility: CLINIC | Age: 84
End: 2024-06-06
Payer: MEDICARE

## 2024-06-06 VITALS
DIASTOLIC BLOOD PRESSURE: 64 MMHG | WEIGHT: 212.1 LBS | HEART RATE: 72 BPM | TEMPERATURE: 97.7 F | HEIGHT: 72 IN | OXYGEN SATURATION: 98 % | BODY MASS INDEX: 28.73 KG/M2 | SYSTOLIC BLOOD PRESSURE: 122 MMHG

## 2024-06-06 DIAGNOSIS — R35.0 BENIGN PROSTATIC HYPERPLASIA WITH URINARY FREQUENCY: ICD-10-CM

## 2024-06-06 DIAGNOSIS — N40.1 BENIGN PROSTATIC HYPERPLASIA WITH URINARY FREQUENCY: ICD-10-CM

## 2024-06-06 DIAGNOSIS — E78.5 HYPERLIPIDEMIA, UNSPECIFIED HYPERLIPIDEMIA TYPE: ICD-10-CM

## 2024-06-06 DIAGNOSIS — R53.83 FATIGUE, UNSPECIFIED TYPE: ICD-10-CM

## 2024-06-06 DIAGNOSIS — I10 ESSENTIAL HYPERTENSION: Primary | ICD-10-CM

## 2024-06-06 DIAGNOSIS — R73.9 HYPERGLYCEMIA: ICD-10-CM

## 2024-06-06 PROCEDURE — 99214 OFFICE O/P EST MOD 30 MIN: CPT | Performed by: FAMILY MEDICINE

## 2024-06-06 PROCEDURE — 1125F AMNT PAIN NOTED PAIN PRSNT: CPT | Performed by: FAMILY MEDICINE

## 2024-06-06 NOTE — PROGRESS NOTES
"Chief Complaint  Hypertension (6 months) and Hyperlipidemia    Subjective        Lucho Blank presents to [facility]  History of Present Illness follow-up on hypertension and hyperlipidemia  Patient denies any headache, blurring of vision, lightheadedness or dizziness.  She is not checking her blood pressure at home.   Patient denies any headache, blurring of vision, lightheadedness or dizziness.  She is not checking her blood pressure at home.   Patient has long history of hyperlipidemia denies any body ache, nausea vomiting.  Denies any problem with medication.   Objective   Vital Signs:  /64   Pulse 72   Temp 97.7 °F (36.5 °C) (Temporal)   Ht 182.9 cm (72.01\")   Wt 96.2 kg (212 lb 1.6 oz)   SpO2 98%   BMI 28.76 kg/m²   Estimated body mass index is 28.76 kg/m² as calculated from the following:    Height as of this encounter: 182.9 cm (72.01\").    Weight as of this encounter: 96.2 kg (212 lb 1.6 oz).             Physical Exam  Constitutional:       General: He is not in acute distress.     Appearance: Normal appearance. He is well-developed.   HENT:      Head: Normocephalic and atraumatic.      Right Ear: Tympanic membrane normal.      Left Ear: Tympanic membrane normal.      Mouth/Throat:      Mouth: Mucous membranes are moist.   Eyes:      General:         Right eye: No discharge.         Left eye: No discharge.      Extraocular Movements: Extraocular movements intact.      Pupils: Pupils are equal, round, and reactive to light.   Cardiovascular:      Rate and Rhythm: Normal rate and regular rhythm.      Pulses: Normal pulses.      Heart sounds: Normal heart sounds.   Pulmonary:      Effort: Pulmonary effort is normal.      Breath sounds: Normal breath sounds. No wheezing or rales.   Abdominal:      General: Bowel sounds are normal.      Palpations: Abdomen is soft. There is no mass.      Tenderness: There is no abdominal tenderness.   Musculoskeletal:      Cervical " back: Normal range of motion and neck supple.      Right lower leg: No edema.      Left lower leg: No edema.   Lymphadenopathy:      Cervical: No cervical adenopathy.   Neurological:      General: No focal deficit present.      Mental Status: He is alert and oriented to person, place, and time.        Result Review :                   Assessment and Plan   Diagnoses and all orders for this visit:    1. Essential hypertension (Primary)    2. Hyperlipidemia, unspecified hyperlipidemia type  -     Comprehensive Metabolic Panel; Future  -     Lipid Panel; Future    3. Benign prostatic hyperplasia with urinary frequency    4. Fatigue, unspecified type  -     CBC & Differential; Future  -     TSH Rfx On Abnormal To Free T4; Future    5. Hyperglycemia  -     Hemoglobin A1c; Future    Lucho Blank is a 83-year-old male patient seen today for follow-up on  Hypertension blood pressure at goal continue same  Hyperlipidemia denies any problem with the medication continue same we will check CMP and lipids prior to next visit         Follow Up   There are no Patient Instructions on file for this visit.   No follow-ups on file.  Patient was given instructions and counseling regarding his condition or for health maintenance advice. Please see specific information pulled into the AVS if appropriate.

## 2024-06-14 RX ORDER — ATORVASTATIN CALCIUM 20 MG/1
TABLET, FILM COATED ORAL
Qty: 90 TABLET | Refills: 3 | Status: SHIPPED | OUTPATIENT
Start: 2024-06-14

## 2024-09-26 RX ORDER — OMEPRAZOLE 40 MG/1
CAPSULE, DELAYED RELEASE ORAL
Qty: 90 CAPSULE | Refills: 3 | Status: SHIPPED | OUTPATIENT
Start: 2024-09-26

## 2024-10-21 RX ORDER — ESCITALOPRAM OXALATE 20 MG/1
20 TABLET ORAL DAILY
Qty: 90 TABLET | Refills: 0 | Status: SHIPPED | OUTPATIENT
Start: 2024-10-21

## 2024-10-28 ENCOUNTER — OFFICE VISIT (OUTPATIENT)
Dept: SLEEP MEDICINE | Facility: HOSPITAL | Age: 84
End: 2024-10-28
Payer: MEDICARE

## 2024-10-28 VITALS
BODY MASS INDEX: 28.85 KG/M2 | WEIGHT: 213 LBS | DIASTOLIC BLOOD PRESSURE: 68 MMHG | HEART RATE: 90 BPM | OXYGEN SATURATION: 93 % | HEIGHT: 72 IN | SYSTOLIC BLOOD PRESSURE: 105 MMHG

## 2024-10-28 DIAGNOSIS — Z78.9 DIFFICULTY WITH CPAP FULL FACE MASK USE: ICD-10-CM

## 2024-10-28 DIAGNOSIS — G47.33 OBSTRUCTIVE SLEEP APNEA: Primary | ICD-10-CM

## 2024-10-28 DIAGNOSIS — E66.3 OVERWEIGHT (BMI 25.0-29.9): ICD-10-CM

## 2024-10-28 PROCEDURE — G0463 HOSPITAL OUTPT CLINIC VISIT: HCPCS

## 2024-10-28 NOTE — PROGRESS NOTES
[facility]  Telephone: [phone] / Fax: [phone] [facility]  Telephone: [phone] / Fax: [phone] Carmela Collazo    PCP: Arminda Raymundo MD    Reason for visit: JHON f/u    Lucho Blank is a 84 y.o.male  was last seen at [facility] sleep lab in October 2023. He reports difficulties with CPAP mask. He has F&P Live style mask that does not fit. It irritates his nose and chin.  He relocated to a new apartment where air is very dry.       CPAP usage has been compromised due to poor mask fitting. Machine settings are 10-20cm H2O. Pre treatment AHI was 19.7/hr and RDI was 30/hr. AHI on treatment is down to 3.9/hr. His sleep schedule is 9:30pm-7am. ESS is 4.    SH- no tobacco, 1 alc per week, 1 caffeine per day.    ROS- negative.    DME [facility]    Current Medications:    Current Outpatient Medications:     atorvastatin (LIPITOR) 20 MG tablet, TAKE 1 TABLET DAILY (NEED APPOINTMENT WITH NEW PRIMARY CARE PHYSICIAN FOR ADDITIONAL REFILLS), Disp: 90 tablet, Rfl: 3    Calcium Carbonate (CALCIUM 500 PO), Take 1 tablet by mouth Daily., Disp: , Rfl:     Coenzyme Q10 (COQ-10 PO), Take  by mouth., Disp: , Rfl:     escitalopram (LEXAPRO) 20 MG tablet, TAKE 1 TABLET DAILY, Disp: 90 tablet, Rfl: 0    icosapent ethyl (VASCEPA) 1 g capsule capsule, TAKE 1 CAPSULE TWICE A DAY WITH MEALS, Disp: 360 capsule, Rfl: 3    loratadine (CLARITIN) 10 MG tablet, TAKE 1 TABLET DAILY, Disp: 90 tablet, Rfl: 3    melatonin 5 MG tablet tablet, Take 1 tablet by mouth At Night As Needed., Disp: , Rfl:     Multiple Vitamin (MULTIVITAMIN) capsule, Take 1 capsule by mouth Daily., Disp: , Rfl:     omeprazole (priLOSEC) 40 MG capsule, TAKE 1 CAPSULE DAILY, Disp: 90 capsule, Rfl: 3    tamsulosin (FLOMAX) 0.4 MG capsule 24 hr capsule, TAKE 1 CAPSULE DAILY, Disp: 90 capsule, Rfl: 3    valsartan (Diovan) 80 MG tablet, Take 1 tablet by mouth Daily., Disp: 90 tablet, Rfl: 0    Vitamin D, Cholecalciferol, 25 MCG (1000 UT) capsule, Take 1  "capsule by mouth Daily., Disp: 90 capsule, Rfl: 0   also entered in Sleep Questionnaire    Patient  has a past medical history of Allergic, Anxiety, Anxiety disorder, Cancer, Cervical pain, Chest pain, 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    I have reviewed the Past Medical History, Past Surgical History, Social History and Family History.    Vital Signs /68   Pulse 90   Ht 182.9 cm (72.01\")   Wt 96.6 kg (213 lb)   SpO2 93%   BMI 28.88 kg/m²  Body mass index is 28.88 kg/m².    General Alert and oriented. No acute distress noted   Pharynx/Throat Class   Mallampati airway, large tongue, no evidence of redundant lateral pharyngeal tissue. No oral lesions. No thrush. Moist mucous membranes.   Head Normocephalic. Symmetrical. Atraumatic.    Nose No septal deviation. No drainage   Chest Wall Normal shape. Symmetric expansion with respiration. No tenderness.   Neck Trachea midline, no thyromegaly or adenopathy    Lungs Clear to auscultation bilaterally. No wheezes. No rhonchi. No rales. Respirations regular, even and unlabored.   Heart Regular rhythm and normal rate. Normal S1 and S2. No murmur   Abdomen Soft, non-tender and non-distended. Normal bowel sounds. No masses.   Extremities Moves all extremities well. No edema   Psychiatric Normal mood and affect.     Testing:  Download last 90 days, average nightly use of 6 hours and 14 minutes on auto CPAP 10-20cm H2O, avg pressure is 13.6cm H2O, AHI is 3.9/hr, leak is 5.7 L.min.      Study-Diagnostic data available to date is as below and was reviewed on current visit:  6/10/18  Overnight split polysomnogram study.  Diagnostic from 10:27 PM to 12:59 AM.  Sleep efficiency 51.9% which is poor.  " Total sleep time 1.32 hours.  There was absence of slow-wave and REM sleep on the diagnostic study.  AHI index 19.7 with RDI 30.3.  Patient slept in supine position for the entire diagnostic portion.  REM stage sleep was not seen and therefore severity underestimated.  Oxygen saturation remained above 88%.  Arousal index 36 events an hour.  PLM index is not increased.  Patient had 36.66% time snoring.  Titration portion from 12:59 AM to 5:30 AM.  Sleep efficiency was again very poor at 56.9%.  Some improvement in slow-wave and REM sleep to 7% each.  AHI index was improved.  There are indicators for severe in rem stage sleep.  CPAP increased from 5-10 cm water pressure stopped maximum sleep noted at CPAP pressure 10 cm.  There was some stage REM sleep at 8 cm water pressure and AHI index is increased because of this.  Patient may require pressures slightly higher than 10 cm.  Please note the patient notes regarding big oral air leak late in the study.    Impression:  1. Obstructive sleep apnea    2. Overweight (BMI 25.0-29.9)    3. Difficulty with CPAP full face mask use          Plan:  Mask fit was done today by our tech. Patient was fitted with F&P Simplus size medium mask. He was asked to adjust humidification on the device and avoid moisturizing the face.    Download reviewed with patient. He was advised to increase compliance and call if pressures feel excessive or insufficient.    Follow up with Dr. Raymundo in 6 months      Thank you for allowing me to participate in your patient's care.      THERESA Chacon  [facility]  Phone: [phone]      Part of this note may be an electronic transcription/translation of spoken language to printed text using the Dragon Dictation System.

## 2024-11-22 DIAGNOSIS — R53.83 FATIGUE, UNSPECIFIED TYPE: ICD-10-CM

## 2024-11-22 DIAGNOSIS — E78.5 HYPERLIPIDEMIA, UNSPECIFIED HYPERLIPIDEMIA TYPE: ICD-10-CM

## 2024-11-22 DIAGNOSIS — R73.9 HYPERGLYCEMIA: ICD-10-CM

## 2024-11-26 LAB
ALBUMIN SERPL-MCNC: 4.3 G/DL (ref 3.5–5.2)
ALBUMIN/GLOB SERPL: 1.8 G/DL
ALP SERPL-CCNC: 94 U/L (ref 39–117)
ALT SERPL-CCNC: 28 U/L (ref 1–41)
AST SERPL-CCNC: 29 U/L (ref 1–40)
BASOPHILS # BLD AUTO: 0.03 10*3/MM3 (ref 0–0.2)
BASOPHILS NFR BLD AUTO: 0.3 % (ref 0–1.5)
BILIRUB SERPL-MCNC: 1.1 MG/DL (ref 0–1.2)
BUN SERPL-MCNC: 14 MG/DL (ref 8–23)
BUN/CREAT SERPL: 10.8 (ref 7–25)
CALCIUM SERPL-MCNC: 9.3 MG/DL (ref 8.6–10.5)
CHLORIDE SERPL-SCNC: 104 MMOL/L (ref 98–107)
CHOLEST SERPL-MCNC: 136 MG/DL (ref 0–200)
CO2 SERPL-SCNC: 25.4 MMOL/L (ref 22–29)
CREAT SERPL-MCNC: 1.3 MG/DL (ref 0.76–1.27)
EGFRCR SERPLBLD CKD-EPI 2021: 54.8 ML/MIN/1.73
EOSINOPHIL # BLD AUTO: 0.13 10*3/MM3 (ref 0–0.4)
EOSINOPHIL NFR BLD AUTO: 1.5 % (ref 0.3–6.2)
ERYTHROCYTE [DISTWIDTH] IN BLOOD BY AUTOMATED COUNT: 12 % (ref 12.3–15.4)
GLOBULIN SER CALC-MCNC: 2.4 GM/DL
GLUCOSE SERPL-MCNC: 129 MG/DL (ref 65–99)
HBA1C MFR BLD: 5.5 % (ref 4.8–5.6)
HCT VFR BLD AUTO: 41.8 % (ref 37.5–51)
HDLC SERPL-MCNC: 34 MG/DL (ref 40–60)
HGB BLD-MCNC: 14.1 G/DL (ref 13–17.7)
IMM GRANULOCYTES # BLD AUTO: 0.02 10*3/MM3 (ref 0–0.05)
IMM GRANULOCYTES NFR BLD AUTO: 0.2 % (ref 0–0.5)
LDLC SERPL CALC-MCNC: 76 MG/DL (ref 0–100)
LYMPHOCYTES # BLD AUTO: 2.11 10*3/MM3 (ref 0.7–3.1)
LYMPHOCYTES NFR BLD AUTO: 24.3 % (ref 19.6–45.3)
MCH RBC QN AUTO: 29.7 PG (ref 26.6–33)
MCHC RBC AUTO-ENTMCNC: 33.7 G/DL (ref 31.5–35.7)
MCV RBC AUTO: 88.2 FL (ref 79–97)
MONOCYTES # BLD AUTO: 0.63 10*3/MM3 (ref 0.1–0.9)
MONOCYTES NFR BLD AUTO: 7.2 % (ref 5–12)
NEUTROPHILS # BLD AUTO: 5.78 10*3/MM3 (ref 1.7–7)
NEUTROPHILS NFR BLD AUTO: 66.5 % (ref 42.7–76)
NRBC BLD AUTO-RTO: 0 /100 WBC (ref 0–0.2)
PLATELET # BLD AUTO: 288 10*3/MM3 (ref 140–450)
POTASSIUM SERPL-SCNC: 4.5 MMOL/L (ref 3.5–5.2)
PROT SERPL-MCNC: 6.7 G/DL (ref 6–8.5)
RBC # BLD AUTO: 4.74 10*6/MM3 (ref 4.14–5.8)
SODIUM SERPL-SCNC: 141 MMOL/L (ref 136–145)
TRIGL SERPL-MCNC: 151 MG/DL (ref 0–150)
TSH SERPL DL<=0.005 MIU/L-ACNC: 4.11 UIU/ML (ref 0.27–4.2)
VLDLC SERPL CALC-MCNC: 26 MG/DL (ref 5–40)
WBC # BLD AUTO: 8.7 10*3/MM3 (ref 3.4–10.8)

## 2024-12-03 ENCOUNTER — OFFICE VISIT (OUTPATIENT)
Dept: FAMILY MEDICINE CLINIC | Facility: CLINIC | Age: 84
End: 2024-12-03
Payer: MEDICARE

## 2024-12-03 VITALS
BODY MASS INDEX: 29.74 KG/M2 | WEIGHT: 219.6 LBS | OXYGEN SATURATION: 96 % | HEART RATE: 80 BPM | RESPIRATION RATE: 16 BRPM | HEIGHT: 72 IN | SYSTOLIC BLOOD PRESSURE: 116 MMHG | TEMPERATURE: 98.2 F | DIASTOLIC BLOOD PRESSURE: 60 MMHG

## 2024-12-03 DIAGNOSIS — F41.8 DEPRESSION WITH ANXIETY: ICD-10-CM

## 2024-12-03 DIAGNOSIS — R53.83 FATIGUE, UNSPECIFIED TYPE: ICD-10-CM

## 2024-12-03 DIAGNOSIS — E78.5 HYPERLIPIDEMIA, UNSPECIFIED HYPERLIPIDEMIA TYPE: Primary | ICD-10-CM

## 2024-12-03 DIAGNOSIS — I10 ESSENTIAL HYPERTENSION: ICD-10-CM

## 2024-12-03 DIAGNOSIS — R73.9 HYPERGLYCEMIA: ICD-10-CM

## 2024-12-03 PROCEDURE — 1126F AMNT PAIN NOTED NONE PRSNT: CPT | Performed by: FAMILY MEDICINE

## 2024-12-03 PROCEDURE — 99214 OFFICE O/P EST MOD 30 MIN: CPT | Performed by: FAMILY MEDICINE

## 2024-12-03 PROCEDURE — G0439 PPPS, SUBSEQ VISIT: HCPCS | Performed by: FAMILY MEDICINE

## 2024-12-03 RX ORDER — CYCLOSPORINE 0.5 MG/ML
EMULSION OPHTHALMIC
COMMUNITY
Start: 2024-09-25

## 2024-12-03 NOTE — PROGRESS NOTES
Subjective   The ABCs of the Annual Wellness Visit  Medicare Wellness Visit      Lucho Blank is a 84 y.o. patient who presents for a Medicare Wellness Visit.    The following portions of the patient's history were reviewed and   updated as appropriate: allergies, current medications, past family history, past medical history, past social history, past surgical history, and problem list.    Compared to one year ago, the patient's physical   health is better.  Compared to one year ago, the patient's mental   health is the same.    Recent Hospitalizations:  He was not admitted to the hospital during the last year.     Current Medical Providers:  Patient Care Team:  Arminda Raymundo MD as PCP - General (Family Medicine)  Tashi Alonzo MD as Consulting Physician (Ophthalmology)  Camilla Billy MD as Consulting Physician (Dermatology)  Johnie Chan Jr., MD as Consulting Physician (Urology)  Ellie Beltran (Psychology)    Outpatient Medications Prior to Visit   Medication Sig Dispense Refill    atorvastatin (LIPITOR) 20 MG tablet TAKE 1 TABLET DAILY (NEED APPOINTMENT WITH NEW PRIMARY CARE PHYSICIAN FOR ADDITIONAL REFILLS) 90 tablet 3    Calcium Carbonate (CALCIUM 500 PO) Take 1 tablet by mouth Daily.      Coenzyme Q10 (COQ-10 PO) Take  by mouth.      cycloSPORINE (RESTASIS) 0.05 % ophthalmic emulsion       escitalopram (LEXAPRO) 20 MG tablet TAKE 1 TABLET DAILY 90 tablet 0    icosapent ethyl (VASCEPA) 1 g capsule capsule TAKE 1 CAPSULE TWICE A DAY WITH MEALS 360 capsule 3    loratadine (CLARITIN) 10 MG tablet TAKE 1 TABLET DAILY 90 tablet 3    melatonin 5 MG tablet tablet Take 1 tablet by mouth At Night As Needed.      Multiple Vitamin (MULTIVITAMIN) capsule Take 1 capsule by mouth Daily.      omeprazole (priLOSEC) 40 MG capsule TAKE 1 CAPSULE DAILY 90 capsule 3    tamsulosin (FLOMAX) 0.4 MG capsule 24 hr capsule TAKE 1 CAPSULE DAILY 90 capsule 3    valsartan (Diovan) 80 MG tablet Take 1 tablet by mouth  "Daily. 90 tablet 0    Vitamin D, Cholecalciferol, 25 MCG (1000 UT) capsule Take 1 capsule by mouth Daily. 90 capsule 0     No facility-administered medications prior to visit.     No opioid medication identified on active medication list. I have reviewed chart for other potential  high risk medication/s and harmful drug interactions in the elderly.      Aspirin is not on active medication list.  Aspirin use is not indicated based on review of current medical condition/s. Risk of harm outweighs potential benefits.  .    Patient Active Problem List   Diagnosis    Encounter for screening for malignant neoplasm of colon    Polyuria     Advance Care Planning Advance Directive is on file.  ACP discussion was held with the patient during this visit. Patient has an advance directive in EMR which is still valid.             Objective   Vitals:    12/03/24 1104   BP: 116/60   BP Location: Right arm   Patient Position: Sitting   Cuff Size: Adult   Pulse: 80   Resp: 16   Temp: 98.2 °F (36.8 °C)   TempSrc: Oral   SpO2: 96%   Weight: 99.6 kg (219 lb 9.6 oz)   Height: 182.9 cm (72.01\")   PainSc: 0-No pain       Estimated body mass index is 29.77 kg/m² as calculated from the following:    Height as of this encounter: 182.9 cm (72.01\").    Weight as of this encounter: 99.6 kg (219 lb 9.6 oz).    BMI is >= 25 and <30. (Overweight) The following options were offered after discussion;: exercise counseling/recommendations and nutrition counseling/recommendations       Does the patient have evidence of cognitive impairment? No  Lab Results   Component Value Date    CHLPL 136 11/26/2024    TRIG 151 (H) 11/26/2024    HDL 34 (L) 11/26/2024    LDL 76 11/26/2024    VLDL 26 11/26/2024    HGBA1C 5.50 11/26/2024                                                                                                Health  Risk Assessment    Smoking Status:  Social History     Tobacco Use   Smoking Status Never   Smokeless Tobacco Never     Alcohol " Consumption:  Social History     Substance and Sexual Activity   Alcohol Use Yes    Alcohol/week: 1.0 standard drink of alcohol    Types: 1 Drinks containing 0.5 oz of alcohol per week    Comment: No problems       Fall Risk Screen  ANASTAS[place] Fall Risk Assessment was completed, and patient is at LOW risk for falls.Assessment completed on:12/3/2024    Depression Screening   Little interest or pleasure in doing things? Not at all   Feeling down, depressed, or hopeless? Not at all   PHQ-2 Total Score 0   Trouble falling or staying asleep, or sleeping too much? Not at all   Feeling tired or having little energy? Not at all   Poor appetite or overeating? Not at all   Feeling bad about yourself - or that you are a failure or have let yourself or your family down? Not at all   Trouble concentrating on things, such as reading the newspaper or watching television? Not at all   Moving or speaking so slowly that other people could have noticed? Or the opposite - being so fidgety or restless that you have been moving around a lot more than usual? Not at all   Thoughts that you would be better off dead, or of hurting yourself in some way? Not at all   PHQ-9 Total Score 0   If you checked off any problems, how difficult have these problems made it for you to do your work, take care of things at home, or get along with other people?        Health Habits and Functional and Cognitive Screenin/3/2024    11:00 AM   Functional & Cognitive Status   Do you have difficulty preparing food and eating? No   Do you have difficulty bathing yourself, getting dressed or grooming yourself? No   Do you have difficulty using the toilet? No   Do you have difficulty moving around from place to place? No   Do you have trouble with steps or getting out of a bed or a chair? No   Current Diet Well Balanced Diet   Dental Exam Up to date   Eye Exam Up to date   Exercise (times per week) 3 times per week   Current Exercises Include Walking   Do you  need help using the phone?  No   Are you deaf or do you have serious difficulty hearing?  No   Do you need help to go to places out of walking distance? No   Do you need help shopping? No   Do you need help preparing meals?  No   Do you need help with housework?  No   Do you need help with laundry? No   Do you need help taking your medications? No   Do you need help managing money? No   Do you ever drive or ride in a car without wearing a seat belt? No   Have you felt unusual stress, anger or loneliness in the last month? No   Who do you live with? Alone   If you need help, do you have trouble finding someone available to you? No   Have you been bothered in the last four weeks by sexual problems? No   Do you have difficulty concentrating, remembering or making decisions? Yes           Age-appropriate Screening Schedule:  Refer to the list below for future screening recommendations based on patient's age, sex and/or medical conditions. Orders for these recommended tests are listed in the plan section. The patient has been provided with a written plan.    Health Maintenance List  Health Maintenance   Topic Date Due    BMI FOLLOWUP  06/06/2024    LIPID PANEL  11/26/2025    ANNUAL WELLNESS VISIT  12/03/2025    TDAP/TD VACCINES (2 - Td or Tdap) 02/04/2029    COLORECTAL CANCER SCREENING  09/01/2031    COVID-19 Vaccine  Completed    RSV Vaccine - Adults  Completed    INFLUENZA VACCINE  Completed    Pneumococcal Vaccine 65+  Completed    ZOSTER VACCINE  Completed                                                                                                                                                CMS Preventative Services Quick Reference  Risk Factors Identified During Encounter  Vision Screening Recommended    The above risks/problems have been discussed with the patient.  Pertinent information has been shared with the patient in the After Visit Summary.  An After Visit Summary and PPPS were made available to the  "patient.    Follow Up:   Next Medicare Wellness visit to be scheduled in 1 year.         Additional E&M Note during same encounter follows:  Patient has additional, significant, and separately identifiable condition(s)/problem(s) that require work above and beyond the Medicare Wellness Visit     Chief Complaint  Medicare Wellness-subsequent    Subjective   HPI  Lucho is also being seen today for additional medical problem/s.  He is also here for follow-up on hypertension, hyperlipidemia and anxiety and depression.  Denies any problem with the medication.                Objective   Vital Signs:  /60 (BP Location: Right arm, Patient Position: Sitting, Cuff Size: Adult)   Pulse 80   Temp 98.2 °F (36.8 °C) (Oral)   Resp 16   Ht 182.9 cm (72.01\")   Wt 99.6 kg (219 lb 9.6 oz)   SpO2 96%   BMI 29.77 kg/m²   Physical Exam  Constitutional:       General: He is not in acute distress.     Appearance: Normal appearance. He is well-developed.   HENT:      Head: Normocephalic and atraumatic.      Right Ear: Tympanic membrane normal.      Left Ear: Tympanic membrane normal.      Mouth/Throat:      Mouth: Mucous membranes are moist.   Eyes:      General:         Right eye: No discharge.         Left eye: No discharge.      Extraocular Movements: Extraocular movements intact.      Pupils: Pupils are equal, round, and reactive to light.   Cardiovascular:      Rate and Rhythm: Normal rate and regular rhythm.      Pulses: Normal pulses.      Heart sounds: Normal heart sounds.   Pulmonary:      Effort: Pulmonary effort is normal.      Breath sounds: Normal breath sounds. No wheezing or rales.   Abdominal:      General: Bowel sounds are normal.      Palpations: Abdomen is soft. There is no mass.      Tenderness: There is no abdominal tenderness.   Musculoskeletal:      Cervical back: Normal range of motion and neck supple.      Right lower leg: No edema.      Left lower leg: No edema.   Lymphadenopathy:      Cervical: No " cervical adenopathy.   Neurological:      General: No focal deficit present.      Mental Status: He is alert and oriented to person, place, and time.                       Assessment and Plan            Hyperlipidemia, unspecified hyperlipidemia type   Lipid abnormalities are stable    Plan:  Continue same medication/s without change.      Discussed medication dosage, use, side effects, and goals of treatment in detail.    Counseled patient on lifestyle modifications to help control hyperlipidemia.     Patient Treatment Goals:   LDL goal is less than 70    Followup in 6 months.    Orders:    Comprehensive Metabolic Panel; Future    Lipid Panel; Future    Essential hypertension  Hypertension is stable and controlled  Continue current treatment regimen.  Blood pressure will be reassessed in 6 months.         Depression with anxiety  Patient's depression is a recurrent episode that is mild without psychosis. Depression is in full remission and stable.    Plan:   Continue current medication therapy     Followup in 6 months.          Hyperglycemia    Orders:    Hemoglobin A1c; Future    Fatigue, unspecified type    Orders:    CBC & Differential; Future    TSH Rfx On Abnormal To Free T4; Future            Follow Up   No follow-ups on file.  Patient was given instructions and counseling regarding his condition or for health maintenance advice. Please see specific information pulled into the AVS if appropriate.

## 2025-01-20 RX ORDER — ESCITALOPRAM OXALATE 20 MG/1
20 TABLET ORAL DAILY
Qty: 90 TABLET | Refills: 3 | OUTPATIENT
Start: 2025-01-20

## 2025-04-29 ENCOUNTER — OFFICE VISIT (OUTPATIENT)
Dept: SLEEP MEDICINE | Facility: HOSPITAL | Age: 85
End: 2025-04-29
Payer: MEDICARE

## 2025-04-29 VITALS
OXYGEN SATURATION: 94 % | WEIGHT: 217 LBS | HEART RATE: 88 BPM | BODY MASS INDEX: 29.39 KG/M2 | DIASTOLIC BLOOD PRESSURE: 68 MMHG | HEIGHT: 72 IN | SYSTOLIC BLOOD PRESSURE: 102 MMHG

## 2025-04-29 DIAGNOSIS — G47.33 OBSTRUCTIVE SLEEP APNEA, ADULT: Primary | ICD-10-CM

## 2025-04-29 DIAGNOSIS — E66.3 OVERWEIGHT (BMI 25.0-29.9): ICD-10-CM

## 2025-04-29 PROCEDURE — G0463 HOSPITAL OUTPT CLINIC VISIT: HCPCS

## 2025-04-29 NOTE — PROGRESS NOTES
[facility] SLEEP MEDICINE  [address]  [address]  [place] [address] [phone]    PCP: Arminda Raymundo MD    Reason for visit:  Sleep disorders: JHON    Lucho is a 84 y.o.male who was seen in the Sleep Disorders Center today. Regular fu. He likes his current mask - ffm. He sleeps from 9:30pm to 7am. Wakes up rested and refreshed. No EDS.  [place] Sleepiness Scale is 5. Caffeine 2 per day. Alcohol 1 per week.    Lucho  reports that he has never smoked. He has never used smokeless tobacco.    Pertinent Positive Review of Systems of denies  Rest of Review of Systems was negative as recorded in Sleep Questionnaire.    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     Current Medications:    Current Outpatient Medications:     atorvastatin (LIPITOR) 20 MG tablet, TAKE 1 TABLET DAILY (NEED APPOINTMENT WITH NEW PRIMARY CARE PHYSICIAN FOR ADDITIONAL REFILLS), Disp: 90 tablet, Rfl: 3    Calcium Carbonate (CALCIUM 500 PO), Take 1 tablet by mouth Daily., Disp: , Rfl:     Coenzyme Q10 (COQ-10 PO), Take  by mouth., Disp: , Rfl:     cycloSPORINE (RESTASIS) 0.05 % ophthalmic emulsion, , Disp: , Rfl:     escitalopram (LEXAPRO) 20 MG tablet, TAKE 1 TABLET DAILY, Disp: 90 tablet, Rfl: 0    icosapent ethyl (VASCEPA) 1 g capsule capsule, TAKE 1 CAPSULE TWICE A DAY WITH MEALS, Disp: 360 capsule, Rfl: 3    loratadine (CLARITIN) 10 MG tablet, TAKE 1 TABLET DAILY, Disp: 90 tablet, Rfl: 3    melatonin 5 MG tablet tablet, Take 1 tablet by mouth At Night As Needed., Disp: , Rfl:     Multiple Vitamin (MULTIVITAMIN) capsule, Take 1 capsule by  "mouth Daily., Disp: , Rfl:     omeprazole (priLOSEC) 40 MG capsule, TAKE 1 CAPSULE DAILY, Disp: 90 capsule, Rfl: 3    tamsulosin (FLOMAX) 0.4 MG capsule 24 hr capsule, TAKE 1 CAPSULE DAILY, Disp: 90 capsule, Rfl: 3    valsartan (Diovan) 80 MG tablet, Take 1 tablet by mouth Daily., Disp: 90 tablet, Rfl: 0    Vitamin D, Cholecalciferol, 25 MCG (1000 UT) capsule, Take 1 capsule by mouth Daily., Disp: 90 capsule, Rfl: 0   also entered in Sleep Questionnaire         Vital Signs: /68   Pulse 88   Ht 182.9 cm (72.01\")   Wt 98.4 kg (217 lb)   SpO2 94%   BMI 29.42 kg/m²     Body mass index is 29.42 kg/m².       Tongue: Normal      Dentition: good       Pharynx: Posterior pharyngeal pillars are narrow   Mallampatti: III (soft and hard palate and base of uvula visible)        General: Alert. Cooperative. Well developed. No acute distress.             Nose: No septal deviation. No drainage.          Throat: No oral lesions. No thrush. Moist mucous membranes.           Lungs:  Clear to auscultation bilaterally.            Heart:  Regular rhythm and normal rate. No murmur.     Diagnostic data available to date is as below and was reviewed on current visit:  6/10/18 :Overnight split polysomnogram study.  Diagnostic from 10:27 PM to 12:59 AM.  Sleep efficiency 51.9% which is poor.  Total sleep time 1.32 hours.  There was absence of slow-wave and REM sleep on the diagnostic study.  AHI index 19.7 with RDI 30.3.  Patient slept in supine position for the entire diagnostic portion.  REM stage sleep was not seen and therefore severity underestimated.  Oxygen saturation remained above 88%.  Arousal index 36 events an hour.  PLM index is not increased.  Patient had 36.66% time snoring.  Titration portion from 12:59 AM to 5:30 AM.  Sleep efficiency was again very poor at 56.9%.  Some improvement in slow-wave and REM sleep to 7% each.  AHI index was improved.  There are indicators for severe in rem stage sleep.  CPAP increased " "from 5-10 cm water pressure stopped maximum sleep noted at CPAP pressure 10 cm.  There was some stage REM sleep at 8 cm water pressure and AHI index is increased because of this.  Patient may require pressures slightly higher than 10 cm.  Please note the patient notes regarding big oral air leak late in the study.    No results found for: \"IRON\", \"TIBC\", \"FERRITIN\"    Most current available usage data reviewed on 04/29/2025:        [employer]: [facility]    Prescription to [facility] for replacement supplies as below:    full face mask    Replace head-gear every 3 months.  Replace cushions every 2-4 weeks.     A 4604 Heated Tubing  every 3 mth    A 7037 Standard Tubing  every 3 mth   x A 7035 Headgear  every 3 mth   x A 7046 Repl Humidifier Chamber  every 6 yrs   x A 7038 Disposable Filters  2 per mth   x A 7039 Non-disposable Filter  every 6 mth   x A 7036 Chin Strap  every 6 mth     Orders Placed This Encounter   Procedures    Pulmonary Results Scan          Impression:  1. Obstructive sleep apnea, adult    2. Overweight (BMI 25.0-29.9)        Plan:  Lucho is compliant and doing well with current mask. No EDS. he will continue device with current settings.    Effective treatment of sleep apnea reduces the associated cardiovascular and cerebrovascular risks associated. In patients with elevated BMI, weight loss can be additionally beneficial. With use of positive pressure device; change of supplies per the permitted insurance schedule is necessary.    This patient is compliant with PAP machine and benefits from its use.  Apnea hypopneas index is corrected/improved.  Daytime hypersomnolence has resolved.     Patient will follow up in this clinic in 1 year APRN    Thank you for allowing me to participate in your patient's care.    Electronically signed by Nick Raymundo MD, 04/29/25, 11:06 AM EDT.    Part of this note may be an electronic transcription/translation of spoken language to printed text using the Dragon Dictation " System.

## 2025-05-20 DIAGNOSIS — N40.1 BENIGN PROSTATIC HYPERPLASIA WITH URINARY FREQUENCY: ICD-10-CM

## 2025-05-20 DIAGNOSIS — R35.0 BENIGN PROSTATIC HYPERPLASIA WITH URINARY FREQUENCY: ICD-10-CM

## 2025-05-20 DIAGNOSIS — R53.83 FATIGUE, UNSPECIFIED TYPE: ICD-10-CM

## 2025-05-20 DIAGNOSIS — R73.9 HYPERGLYCEMIA: ICD-10-CM

## 2025-05-20 DIAGNOSIS — E78.5 HYPERLIPIDEMIA, UNSPECIFIED HYPERLIPIDEMIA TYPE: ICD-10-CM

## 2025-05-20 RX ORDER — TAMSULOSIN HYDROCHLORIDE 0.4 MG/1
1 CAPSULE ORAL DAILY
Qty: 90 CAPSULE | Refills: 1 | Status: SHIPPED | OUTPATIENT
Start: 2025-05-20

## 2025-05-28 LAB
ALBUMIN SERPL-MCNC: 4.3 G/DL (ref 3.5–5.2)
ALBUMIN/GLOB SERPL: 2.4 G/DL
ALP SERPL-CCNC: 103 U/L (ref 39–117)
ALT SERPL-CCNC: 38 U/L (ref 1–41)
AST SERPL-CCNC: 32 U/L (ref 1–40)
BASOPHILS # BLD AUTO: 0.05 10*3/MM3 (ref 0–0.2)
BASOPHILS NFR BLD AUTO: 0.6 % (ref 0–1.5)
BILIRUB SERPL-MCNC: 0.9 MG/DL (ref 0–1.2)
BUN SERPL-MCNC: 12 MG/DL (ref 8–23)
BUN/CREAT SERPL: 10.7 (ref 7–25)
CALCIUM SERPL-MCNC: 9.1 MG/DL (ref 8.6–10.5)
CHLORIDE SERPL-SCNC: 107 MMOL/L (ref 98–107)
CHOLEST SERPL-MCNC: 110 MG/DL (ref 0–200)
CO2 SERPL-SCNC: 24.8 MMOL/L (ref 22–29)
CREAT SERPL-MCNC: 1.12 MG/DL (ref 0.76–1.27)
EGFRCR SERPLBLD CKD-EPI 2021: 65.6 ML/MIN/1.73
EOSINOPHIL # BLD AUTO: 0.11 10*3/MM3 (ref 0–0.4)
EOSINOPHIL NFR BLD AUTO: 1.4 % (ref 0.3–6.2)
ERYTHROCYTE [DISTWIDTH] IN BLOOD BY AUTOMATED COUNT: 12.5 % (ref 12.3–15.4)
GLOBULIN SER CALC-MCNC: 1.8 GM/DL
GLUCOSE SERPL-MCNC: 91 MG/DL (ref 65–99)
HBA1C MFR BLD: 5 % (ref 4.8–5.6)
HCT VFR BLD AUTO: 40.4 % (ref 37.5–51)
HDLC SERPL-MCNC: 26 MG/DL (ref 40–60)
HGB BLD-MCNC: 13.7 G/DL (ref 13–17.7)
IMM GRANULOCYTES # BLD AUTO: 0.02 10*3/MM3 (ref 0–0.05)
IMM GRANULOCYTES NFR BLD AUTO: 0.3 % (ref 0–0.5)
LDLC SERPL CALC-MCNC: 58 MG/DL (ref 0–100)
LYMPHOCYTES # BLD AUTO: 1.99 10*3/MM3 (ref 0.7–3.1)
LYMPHOCYTES NFR BLD AUTO: 25.8 % (ref 19.6–45.3)
MCH RBC QN AUTO: 31.1 PG (ref 26.6–33)
MCHC RBC AUTO-ENTMCNC: 33.9 G/DL (ref 31.5–35.7)
MCV RBC AUTO: 91.8 FL (ref 79–97)
MONOCYTES # BLD AUTO: 0.58 10*3/MM3 (ref 0.1–0.9)
MONOCYTES NFR BLD AUTO: 7.5 % (ref 5–12)
NEUTROPHILS # BLD AUTO: 4.96 10*3/MM3 (ref 1.7–7)
NEUTROPHILS NFR BLD AUTO: 64.4 % (ref 42.7–76)
NRBC BLD AUTO-RTO: 0 /100 WBC (ref 0–0.2)
PLATELET # BLD AUTO: 257 10*3/MM3 (ref 140–450)
POTASSIUM SERPL-SCNC: 4.7 MMOL/L (ref 3.5–5.2)
PROT SERPL-MCNC: 6.1 G/DL (ref 6–8.5)
RBC # BLD AUTO: 4.4 10*6/MM3 (ref 4.14–5.8)
SODIUM SERPL-SCNC: 142 MMOL/L (ref 136–145)
TRIGL SERPL-MCNC: 148 MG/DL (ref 0–150)
TSH SERPL DL<=0.005 MIU/L-ACNC: 3.34 UIU/ML (ref 0.27–4.2)
VLDLC SERPL CALC-MCNC: 26 MG/DL (ref 5–40)
WBC # BLD AUTO: 7.71 10*3/MM3 (ref 3.4–10.8)

## 2025-06-03 ENCOUNTER — OFFICE VISIT (OUTPATIENT)
Dept: FAMILY MEDICINE CLINIC | Facility: CLINIC | Age: 85
End: 2025-06-03
Payer: MEDICARE

## 2025-06-03 VITALS
RESPIRATION RATE: 16 BRPM | BODY MASS INDEX: 29.17 KG/M2 | SYSTOLIC BLOOD PRESSURE: 108 MMHG | DIASTOLIC BLOOD PRESSURE: 62 MMHG | HEIGHT: 72 IN | WEIGHT: 215.4 LBS | HEART RATE: 72 BPM | OXYGEN SATURATION: 97 % | TEMPERATURE: 98.2 F

## 2025-06-03 DIAGNOSIS — I10 ESSENTIAL HYPERTENSION: Primary | ICD-10-CM

## 2025-06-03 DIAGNOSIS — E78.5 HYPERLIPIDEMIA, UNSPECIFIED HYPERLIPIDEMIA TYPE: ICD-10-CM

## 2025-06-03 PROCEDURE — 99214 OFFICE O/P EST MOD 30 MIN: CPT | Performed by: FAMILY MEDICINE

## 2025-06-03 PROCEDURE — 1126F AMNT PAIN NOTED NONE PRSNT: CPT | Performed by: FAMILY MEDICINE

## 2025-06-03 NOTE — PROGRESS NOTES
"Chief Complaint  Hyperlipidemia, Hypertension, and Diplopia (On watch list for myasthenia gravis per Eye Dr. Rand and goes per pt)    Subjective        Lucho Blank presents to [facility]  History of Present Illness    History of Present Illness  The patient presents for a follow-up on high cholesterol and hypertension.    He reports no adverse reactions to his current medication regimen and is not experiencing any symptoms of dizziness or lightheadedness.    He has been experiencing double vision, which was  diagnosed by his ophthalmologist, Dr. Vázquez. He is in regular communication with Dr. Vázquez every 3 weeks to monitor for any signs of myasthenia gravis.    He is under the care of a nurse practitioner for depression and anxiety.   He has a history of sunburn, which is currently manifesting as skin flaking. He has been applying baby oil to the affected areas intermittently. His dermatologist has reassured him that there is no cause for concern. However, he has noticed a few spots on his chest that are deeply colored and occasionally itch. He plans to consult his dermatologist about these spots in the near future.    FAMILY HISTORY  His daughter has the same problem with low HDL.     Objective   Vital Signs:  /62 (BP Location: Right arm, Patient Position: Sitting, Cuff Size: Adult)   Pulse 72   Temp 98.2 °F (36.8 °C) (Oral)   Resp 16   Ht 182.9 cm (72.01\")   Wt 97.7 kg (215 lb 6.4 oz)   SpO2 97%   BMI 29.21 kg/m²   Estimated body mass index is 29.21 kg/m² as calculated from the following:    Height as of this encounter: 182.9 cm (72.01\").    Weight as of this encounter: 97.7 kg (215 lb 6.4 oz).               Physical Exam  Constitutional:       General: He is not in acute distress.     Appearance: Normal appearance. He is well-developed.   HENT:      Head: Normocephalic and atraumatic.      Right Ear: Tympanic membrane normal.      Left Ear: Tympanic membrane " "normal.      Mouth/Throat:      Mouth: Mucous membranes are moist.   Eyes:      General:         Right eye: No discharge.         Left eye: No discharge.      Extraocular Movements: Extraocular movements intact.      Pupils: Pupils are equal, round, and reactive to light.   Cardiovascular:      Rate and Rhythm: Normal rate and regular rhythm.      Pulses: Normal pulses.      Heart sounds: Normal heart sounds.   Pulmonary:      Effort: Pulmonary effort is normal.      Breath sounds: Normal breath sounds. No wheezing or rales.   Abdominal:      General: Bowel sounds are normal.      Palpations: Abdomen is soft. There is no mass.      Tenderness: There is no abdominal tenderness.   Musculoskeletal:      Cervical back: Normal range of motion and neck supple.      Right lower leg: No edema.      Left lower leg: No edema.   Lymphadenopathy:      Cervical: No cervical adenopathy.   Neurological:      General: No focal deficit present.      Mental Status: He is alert and oriented to person, place, and time.        Result Review :    [facility]   CMP          11/26/2024    10:56 5/27/2025    11:06   CMP   Glucose 129  91    BUN 14  12.0    Creatinine 1.30  1.12    EGFR 54.8  65.6    Sodium 141  142    Potassium 4.5  4.7    Chloride 104  107    Calcium 9.3  9.1    Total Protein 6.7  6.1    Albumin 4.3  4.3    Globulin 2.4  1.8    Total Bilirubin 1.1  0.9    Alkaline Phosphatase 94  103    AST (SGOT) 29  32    ALT (SGPT) 28  38    Albumin/Globulin Ratio 1.8  2.4    BUN/Creatinine Ratio 10.8  10.7      LIPID   Lipid Panel          11/26/2024    10:56 5/27/2025    11:06   Lipid Panel   Total Cholesterol 136  110    Triglycerides 151  148    HDL Cholesterol 34  26    VLDL Cholesterol 26  26    LDL Cholesterol  76  58      TSH   TSH          11/26/2024    10:56 5/27/2025    11:06   TSH   TSH 4.110  3.340      FREE T4 No components found for: \"FREET4\"  A1C LAST 3   A1C Last 3 Results          11/26/2024    10:56 5/27/2025    11:06 "   HGBA1C Last 3 Results   Hemoglobin A1C 5.50  5.00                   Assessment and Plan   Diagnoses and all orders for this visit:    1. Essential hypertension (Primary)    2. Hyperlipidemia, unspecified hyperlipidemia type        Assessment & Plan  1. Hypertension.  - Blood pressure is well-controlled at 108/62.  - Reports no dizziness or lightheadedness.  - Current medication regimen is effective.  - Continue current medication regimen.    2. Hypercholesterolemia.  - Low HDL levels likely due to genetic factors.  - Recent labs reviewed, thyroid function is normal.  - Advised to increase physical activity and consume foods rich in good fats, such as olive oil, avocado oil, nuts, and fish.  - Fish oil supplements may also help increase HDL levels.          Follow-up  The patient will follow up in 6 months.          Follow Up   There are no Patient Instructions on file for this visit.   No follow-ups on file.  Patient was given instructions and counseling regarding his condition or for health maintenance advice. Please see specific information pulled into the AVS if appropriate.     Patient or patient representative verbalized consent for the use of Ambient Listening during the visit with  Arminda Raymundo MD for chart documentation. 6/18/2025  22:51 EDT     1

## 2025-06-09 RX ORDER — ATORVASTATIN CALCIUM 20 MG/1
TABLET, FILM COATED ORAL
Qty: 90 TABLET | Refills: 1 | Status: SHIPPED | OUTPATIENT
Start: 2025-06-09

## 2025-06-18 RX ORDER — ICOSAPENT ETHYL 1 G/1
1 CAPSULE ORAL 2 TIMES DAILY WITH MEALS
Qty: 180 CAPSULE | Refills: 1 | Status: SHIPPED | OUTPATIENT
Start: 2025-06-18

## 2025-06-18 NOTE — TELEPHONE ENCOUNTER
Rx Refill Note  Requested Prescriptions     Pending Prescriptions Disp Refills    icosapent ethyl (VASCEPA) 1 g capsule capsule [Pharmacy Med Name: ICOSAPENT ETHYL CAPS 1GM] 180 capsule 3     Sig: TAKE 1 CAPSULE TWICE A DAY WITH MEALS      Last office visit with prescribing clinician: 6/3/2025   Last telemedicine visit with prescribing clinician: Visit date not found   Next office visit with prescribing clinician: 12/5/2025                         Would you like a call back once the refill request has been completed: [] Yes [] No    If the office needs to give you a call back, can they leave a voicemail: [] Yes [] No    Josette Rascon MA  06/18/25, 09:28 EDT

## (undated) DEVICE — CANN O2 ETCO2 FITS ALL CONN CO2 SMPL A/ 7IN DISP LF

## (undated) DEVICE — TUBING, SUCTION, 1/4" X 10', STRAIGHT: Brand: MEDLINE

## (undated) DEVICE — SENSR O2 OXIMAX FNGR A/ 18IN NONSTR

## (undated) DEVICE — LN SMPL CO2 SHTRM SD STREAM W/M LUER

## (undated) DEVICE — KT ORCA ORCAPOD DISP STRL

## (undated) DEVICE — ADAPT CLN BIOGUARD AIR/H2O DISP